# Patient Record
Sex: FEMALE | Race: WHITE | NOT HISPANIC OR LATINO | Employment: PART TIME | URBAN - METROPOLITAN AREA
[De-identification: names, ages, dates, MRNs, and addresses within clinical notes are randomized per-mention and may not be internally consistent; named-entity substitution may affect disease eponyms.]

---

## 2017-01-18 ENCOUNTER — PRENATAL OFFICE VISIT (OUTPATIENT)
Dept: NURSING | Facility: CLINIC | Age: 27
End: 2017-01-18
Payer: COMMERCIAL

## 2017-01-18 VITALS
HEART RATE: 113 BPM | OXYGEN SATURATION: 100 % | DIASTOLIC BLOOD PRESSURE: 76 MMHG | RESPIRATION RATE: 18 BRPM | WEIGHT: 165.5 LBS | SYSTOLIC BLOOD PRESSURE: 123 MMHG | BODY MASS INDEX: 28.39 KG/M2

## 2017-01-18 DIAGNOSIS — N91.2 AMENORRHEA: Primary | ICD-10-CM

## 2017-01-18 LAB — BETA HCG QUAL IFA URINE: POSITIVE

## 2017-01-18 PROCEDURE — 99207 ZZC NO CHARGE NURSE ONLY: CPT

## 2017-01-18 PROCEDURE — 84703 CHORIONIC GONADOTROPIN ASSAY: CPT | Performed by: FAMILY MEDICINE

## 2017-01-18 RX ORDER — PRENATAL VIT/IRON FUM/FOLIC AC 27MG-0.8MG
1 TABLET ORAL DAILY
Qty: 100 TABLET | Refills: 3 | COMMUNITY
Start: 2017-01-18 | End: 2018-09-15

## 2017-01-18 NOTE — PROGRESS NOTES
Subjective: 26 year old patient presents for pregnancy confirmation. Her last menstrual period was 12/9/16. She has had a positive home test This is her first pregnancy, G1, P0. She has had previous n/a. She would like to be seen Dr. Cedeno for her prenatal care. She has started prenatal vitamins.      Exam:  General Appearance: appears well.  Her urine pregnancy test is positive.    Assessment: positive pregnancy confirmation.    Plan: Patient has an EDC of 9/14/17. Is currently 5w4d along. I scheduled her first OB appointment with Dr. Cedeno for 2/27/17 5:30 pm. Risk assessment done. We discussed basic pregnancy care, diet, exercise and prenatal vitamins.     Pre Term Labor Risk Assessment   1. Is the patient's age <18 or >40? no  2. Does the patient have a BMI < 18.5? no  3. If previous pregnancy, was delivery within previous 6 months? no  4. Have you ever been diagnosed with pyelonephritis? no  5. Have you ever delivered a baby prior to 37 weeks gestation? no  6. Have you ever been told you have a uterine anomaly? no  7. Do you currently have uterine fibroids? no  8. Have you had any gynecological surgical procedures such as cervical conization, a LEEP procedure, laser treatment or cryosurgery of the cervix? no  9. Did your mother take DEWAYNE or any other hormones when she was pregnant with you? no  10. Did conception for this pregnancy occur via In Vitro Fertilization? no  11. Are you carrying twins? no  12. Do you currently use tobacco products? no  13. Prior to this pregnancy, how much alcohol did you drink each week? (if >7/week= high risk..)  1/wk  14. Since you learned you were pregnant, how much beer, wine or hard liquor did you drink per week? (anything >0 = high risk) none  15. Prior to learning you are pregnant, did you use any of the following: marijuana, prescription narcotics, speed, cocaine, heroin, hallucinogens or other drugs? (any use within 1 yr = high risk)  none  16. Since you learned you are  pregnant, have you used any of the following: marijuana, prescription narcotics, speed, cocaine, heroin, hallucinogens or other drugs? (any use = high risk)  none  17. Do you have a history of chemical dependency (yes=high risk)  no  18. Have you ever been treated for more than 1 Urinary Tract Infection during this pregnancy? no  19. Do you have a history of Depression, Bi-polar disorder, anxiety, schizophrenia or other mental illness?  Yes, age 18-20  20. Are you currently being treated for depression, bi-polar disorder, anxiety, schizophrenia or other mental illness? no  21. Have you had Chlamydia or gonorrhea during this pregnancy? no  22. Periodontal disease (gum disease)? no  23. Has anyone hit, slapped, kicked or otherwise hurt you? no  24. Has anyone forced you to have sex when you didn't want to? no  Summary:   Patient is not high risk for  Labor   Jessica Conti RN

## 2017-02-27 ENCOUNTER — PRENATAL OFFICE VISIT (OUTPATIENT)
Dept: FAMILY MEDICINE | Facility: CLINIC | Age: 27
End: 2017-02-27
Payer: COMMERCIAL

## 2017-02-27 VITALS
WEIGHT: 164 LBS | HEIGHT: 64 IN | SYSTOLIC BLOOD PRESSURE: 122 MMHG | TEMPERATURE: 98.1 F | HEART RATE: 108 BPM | BODY MASS INDEX: 28 KG/M2 | DIASTOLIC BLOOD PRESSURE: 64 MMHG

## 2017-02-27 DIAGNOSIS — R79.89 LOW SERUM SODIUM: ICD-10-CM

## 2017-02-27 DIAGNOSIS — Z34.90 PRENATAL CARE, UNSPECIFIED TRIMESTER: Primary | ICD-10-CM

## 2017-02-27 DIAGNOSIS — Z34.91 INITIAL OBSTETRIC VISIT IN FIRST TRIMESTER: ICD-10-CM

## 2017-02-27 DIAGNOSIS — R79.0 LOW IRON STORES: ICD-10-CM

## 2017-02-27 LAB
BETA HCG QUAL IFA URINE: POSITIVE
ERYTHROCYTE [DISTWIDTH] IN BLOOD BY AUTOMATED COUNT: 13.9 % (ref 10–15)
HCT VFR BLD AUTO: 35.9 % (ref 35–47)
HGB BLD-MCNC: 12 G/DL (ref 11.7–15.7)
MCH RBC QN AUTO: 25.6 PG (ref 26.5–33)
MCHC RBC AUTO-ENTMCNC: 33.4 G/DL (ref 31.5–36.5)
MCV RBC AUTO: 77 FL (ref 78–100)
PLATELET # BLD AUTO: 319 10E9/L (ref 150–450)
RBC # BLD AUTO: 4.69 10E12/L (ref 3.8–5.2)
WBC # BLD AUTO: 9.5 10E9/L (ref 4–11)

## 2017-02-27 PROCEDURE — 87389 HIV-1 AG W/HIV-1&-2 AB AG IA: CPT | Performed by: FAMILY MEDICINE

## 2017-02-27 PROCEDURE — 86850 RBC ANTIBODY SCREEN: CPT | Performed by: FAMILY MEDICINE

## 2017-02-27 PROCEDURE — 36415 COLL VENOUS BLD VENIPUNCTURE: CPT | Performed by: FAMILY MEDICINE

## 2017-02-27 PROCEDURE — 87591 N.GONORRHOEAE DNA AMP PROB: CPT | Performed by: FAMILY MEDICINE

## 2017-02-27 PROCEDURE — 86900 BLOOD TYPING SEROLOGIC ABO: CPT | Performed by: FAMILY MEDICINE

## 2017-02-27 PROCEDURE — 86780 TREPONEMA PALLIDUM: CPT | Performed by: FAMILY MEDICINE

## 2017-02-27 PROCEDURE — 87491 CHLMYD TRACH DNA AMP PROBE: CPT | Performed by: FAMILY MEDICINE

## 2017-02-27 PROCEDURE — 80048 BASIC METABOLIC PNL TOTAL CA: CPT | Performed by: FAMILY MEDICINE

## 2017-02-27 PROCEDURE — 86762 RUBELLA ANTIBODY: CPT | Performed by: FAMILY MEDICINE

## 2017-02-27 PROCEDURE — 85027 COMPLETE CBC AUTOMATED: CPT | Performed by: FAMILY MEDICINE

## 2017-02-27 PROCEDURE — 99207 ZZC FIRST OB VISIT: CPT | Performed by: FAMILY MEDICINE

## 2017-02-27 PROCEDURE — 84703 CHORIONIC GONADOTROPIN ASSAY: CPT | Performed by: FAMILY MEDICINE

## 2017-02-27 PROCEDURE — 87340 HEPATITIS B SURFACE AG IA: CPT | Performed by: FAMILY MEDICINE

## 2017-02-27 PROCEDURE — 87086 URINE CULTURE/COLONY COUNT: CPT | Performed by: FAMILY MEDICINE

## 2017-02-27 PROCEDURE — 86901 BLOOD TYPING SEROLOGIC RH(D): CPT | Performed by: FAMILY MEDICINE

## 2017-02-27 NOTE — NURSING NOTE
"Chief Complaint   Patient presents with     Prenatal Care     1st OB, 11 weeks 3 days       Initial /64 (BP Location: Right arm, Patient Position: Chair, Cuff Size: Adult Regular)  Pulse 108  Temp 98.1  F (36.7  C) (Oral)  Ht 5' 4\" (1.626 m)  Wt 164 lb (74.4 kg)  LMP 12/09/2016 (Exact Date)  BMI 28.15 kg/m2 Estimated body mass index is 28.15 kg/(m^2) as calculated from the following:    Height as of this encounter: 5' 4\" (1.626 m).    Weight as of this encounter: 164 lb (74.4 kg).  Medication Reconciliation: complete     Rangel Gaxiola CMA      "

## 2017-02-27 NOTE — MR AVS SNAPSHOT
After Visit Summary   2/27/2017    Nini Wang    MRN: [id number]           Patient Information     Date Of Birth          1990        Visit Information        Provider Department      2/27/2017 5:30 PM Judit Colbert MD [facility]        Today's Diagnoses     Prenatal care, unspecified trimester    -  1    Low iron stores        Low serum sodium          Care Instructions      Fetal development begins soon after conception. Find out how your baby grows and develops during the first trimester.    You're pregnant. Congratulations! You'll undoubtedly spend the months ahead wondering how your baby is growing and developing. What does your baby look like? How big is he or she? When will you feel the first kick?  Fetal development typically follows a predictable course. Find out what happens during the first trimester by checking out this weekly calendar of events. Keep in mind that measurements are approximate.  It might seem strange, but you're not actually pregnant the first week or two of the time allotted to your pregnancy. Yes, you read that correctly!  Conception typically occurs about two weeks after your last period begins. To calculate your due date, your health care provider will count ahead 40 weeks from the start of your last period. This means your period is counted as part of your pregnancy -- even though you weren't pregnant at the time.  The sperm and egg unite in one of your fallopian tubes to form a one-celled entity called a zygote. If more than one egg is released and fertilized, you might have multiple zygotes.  The zygote typically has 46 chromosomes -- 23 from you and 23 from the father. These chromosomes help determine your baby's sex, traits such as eye and hair color, and, to some extent, personality and intelligence.  Soon after fertilization, the zygote travels down the fallopian tube toward the uterus. At the same time, it will begin dividing to form  a cluster of cells resembling a tiny raspberry -- a morula.  By the time it reaches the uterus, the rapidly dividing ball of cells -- now known as a blastocyst -- has  into two sections.  The inner group of cells will become the embryo. The outer group will become the cells that nourish and protect it. On contact, the blastocyst will burrow into the uterine wall for nourishment. This process is called implantation.  The placenta, which will nourish your baby throughout the pregnancy, also begins to form.   The fifth week of pregnancy, or the third week after conception, marks the beginning of the embryonic period. This is when the baby's brain, spinal cord, heart and other organs begin to form.  The embryo is now made of three layers. The top layer -- the ectoderm -- will give rise to your baby's outermost layer of skin, central and peripheral nervous systems, eyes, inner ears, and many connective tissues.  Your baby's heart and a primitive circulatory system will form in the middle layer of cells -- the mesoderm. This layer of cells will also serve as the foundation for your baby's bones, muscles, kidneys and much of the reproductive system.  The inner layer of cells -- the endoderm -- will become a simple tube lined with mucous membranes. Your baby's lungs, intestines and bladder will develop here.  By the end of this week, your baby is likely about the size of the tip of a pen.  Growth is rapid this week. Just four weeks after conception, the neural tube along your baby's back is closing and your baby's heart is pumping blood.  Basic facial features will begin to appear, including passageways that will make up the inner ears and arches that will contribute to the jaw. Your baby's body begins to take on a C-shaped curvature. Small buds will soon become arms and legs.  Seven weeks into your pregnancy, or five weeks after conception, your baby's brain and face are rapidly developing. Tiny nostrils become  visible, and the eye lenses begin to form. The arm buds that sprouted last week now take on the shape of paddles.  By the end of this week, your baby might be a little bigger than the top of a pencil eraser.  Eight weeks into your pregnancy, or six weeks after conception, your baby's arms and legs are growing longer, and fingers have begun to form. The shell-shaped parts of your baby's ears also are forming, and your baby's eyes are visible. The upper lip and nose have formed. The trunk of your baby's body is beginning to straighten.  By the end of this week, your baby might be about 1/2 inch (11 to 14 millimeters) long.  In the ninth week of pregnancy, or seven weeks after conception, your baby's arms grow, develop bones and bend at the elbows. Toes form, and your baby's eyelids and ears continue developing.   By the end of this week, your baby might be about 3/4 inch (20 millimeters) long.   By the 10th week of pregnancy, or eight weeks after conception, your baby's head has become more round. The neck begins to develop, and your baby's eyelids begin to close to protect his or her developing eyes.   At the beginning of the 11th week of pregnancy, or the ninth week after conception, your baby's head still makes up about half of its length. However, your baby's body is about to catch up, growing rapidly in the coming weeks.  Your baby is now officially described as a fetus. This week your baby's eyes are widely , the eyelids fused and the ears low set. Red blood cells are beginning to form in your baby's liver. By the end of this week, your baby's external genitalia will start developing into a penis or clitoris and labia majora.  By now your baby might measure about 2 inches (50 millimeters) long from crown to rump and weigh almost 1/3 ounce (8 grams).  Twelve weeks into your pregnancy, or 10 weeks after conception, your baby is developing fingernails. Your baby's face now has a human profile.  By now your  "baby might be about 2 1/2 inches (60 millimeters) long from crown to rump and weigh about 1/2 ounce (14 grams).                Follow-ups after your visit        Follow-up notes from your care team     Return in about 4 weeks (around 3/27/2017).      Future tests that were ordered for you today     Open Future Orders        Priority Expected Expires Ordered    US OB > 14 Weeks Complete Single Routine  2018            Who to contact     If you have questions or need follow up information about today's clinic visit or your schedule please contact [facility] directly at [phone].  Normal or non-critical lab and imaging results will be communicated to you by Is[employer], letter or phone within 4 business days after the clinic has received the results. If you do not hear from us within 7 days, please contact the clinic through Isagenhart or phone. If you have a critical or abnormal lab result, we will notify you by phone as soon as possible.  Submit refill requests through [employer] or call your pharmacy and they will forward the refill request to us. Please allow 3 business days for your refill to be completed.          Additional Information About Your Visit        Isagenhart Information     [employer] lets you send messages to your doctor, view your test results, renew your prescriptions, schedule appointments and more. To sign up, go to [email or web address] . Click on \"Log in\" on the left side of the screen, which will take you to the Welcome page. Then click on \"Sign up Now\" on the right side of the page.     You will be asked to enter the access code listed below, as well as some personal information. Please follow the directions to create your username and password.     Your access code is: [email or web address]  Expires: 2017  6:03 PM     Your access code will  in 90 days. If you need help or a new code, please call your [facility] or [phone].        Care EveryWhere ID     " "This is your Care EveryWhere ID. This could be used by other organizations to access your [facility] medical records  [id number]        Your Vitals Were     Pulse Temperature Height Last Period BMI (Body Mass Index)       108 98.1  F (36.7  C) (Oral) 5' 4\" (1.626 m) 12/09/2016 (Exact Date) 28.15 kg/m2        Blood Pressure from Last 3 Encounters:   02/27/17 122/64   01/18/17 123/76   10/07/15 125/65    Weight from Last 3 Encounters:   02/27/17 164 lb (74.4 kg)   01/18/17 165 lb 8 oz (75.1 kg)   10/07/15 162 lb (73.5 kg)              We Performed the Following     ABO/Rh type and screen     Anti Treponema     Basic metabolic panel     Beta HCG qual IFA urine     CBC with platelets     Chlamydia trachomatis PCR     Hepatitis B surface antigen     HIV Antigen Antibody Combo     Neisseria gonorrhoeae PCR     Rubella Antibody IgG Quantitative     Urine Culture Aerobic Bacterial        Primary Care Provider Office Phone # Fax #    Judit Colbert [phone]       [facility] [address] [facility] [address]        Thank you!     Thank you for choosing [facility]  for your care. Our goal is always to provide you with excellent care. Hearing back from our patients is one way we can continue to improve our services. Please take a few minutes to complete the written survey that you may receive in the mail after your visit with us. Thank you!             Your Updated Medication List - Protect others around you: Learn how to safely use, store and throw away your medicines at www.disposemymeds.org.          This list is accurate as of: 2/27/17  6:03 PM.  Always use your most recent med list.                   Brand Name Dispense Instructions for use    prenatal multivitamin  plus iron 27-0.8 MG Tabs per tablet     100 tablet    Take 1 tablet by mouth daily         "

## 2017-02-27 NOTE — PROGRESS NOTES
"Subjective:  Nini Wang is a 27 year old female  who presents today for her first prenatal visit.  She has never had an abnormal PAP smear.  Her LMP was 12/9/16.  She has had trouble with nausea.  This is her 1st pregnancy.  She is a G 1 P 0.    Her EDC is 9/15/17.  She has the following questions:  none    Current Outpatient Prescriptions   Medication Sig Dispense Refill     Prenatal Vit-Fe Fumarate-FA (PRENATAL MULTIVITAMIN  PLUS IRON) 27-0.8 MG TABS per tablet Take 1 tablet by mouth daily 100 tablet 3       Past Medical History   Diagnosis Date     Depression      age 18-21 - now in remission     Low iron stores      has taken iron in 2014       Past Surgical History   Procedure Laterality Date     No history of surgery         Family History   Problem Relation Age of Onset     Thyroid Disease Mother      GASTROINTESTINAL DISEASE Father      small bowel obstructions     Dementia Maternal Grandmother      vascular     Lung Cancer Paternal Grandmother      Pancreatic Cancer Paternal Grandfather 78       Social History   Substance Use Topics     Smoking status: Never Smoker     Smokeless tobacco: Never Used     Alcohol use Yes      Comment: not while pregnant       Objective:  Blood pressure 122/64, pulse 108, temperature 98.1  F (36.7  C), temperature source Oral, height 5' 4\" (1.626 m), weight 164 lb (74.4 kg), last menstrual period 12/09/2016, not currently breastfeeding.  General appearance: Healthy.  Mental Status: cooperative, normal affect, no gross thought process defects.  HEENT:   Ears- Normal external canals and TMs  Eyes- PERRL, EOM's intact, fundi benign, sharp disc margins.  Throat- Normal  Nodes- Negative  Thyroid: Normal to palpation, no enlargement or nodules noted.  Breasts: Symmetric without mass tenderness or discharge.  Axillary nodes negative.  Lungs: Clear to auscultation bilaterally  Heart.: Normal rate and rhythm.  No murmurs, clicks or gallops.  Pulses:    R-4/4, PT-4/4, " DP-4/4  Abdomen: BS active. Soft, non-tender, no masses or organomegaly.  Aorta normal. No bruits.  Genitalia: Normal female external genitalia without lesions.  Speculum:  Cervix normal,  Pap taken, bimanual exam  12 size uterus, no adenexal mass or tenderness.  Doptone 150-160.  Extremities: Normal without edema  Reflexes:  Symmetric bilaterally and 2 + at the patella  Skin: Clear and free of rash.    Assessment:  1. Nini Wang is a healthy 27 year old female here for a first prenatal visit.  2. Hx of low sodium and low iron a few years ago    Plan:  1. A Pap smear was NOT obtained  2. Prenatal labs ordered - see epicare orders  3. fetal anatomy survey to be done around 20 weeks gestation  4. Patient is to follow-up in 4 weeks and as needed.

## 2017-02-28 LAB
ABO + RH BLD: NORMAL
ABO + RH BLD: NORMAL
ANION GAP SERPL CALCULATED.3IONS-SCNC: 12 MMOL/L (ref 3–14)
BACTERIA SPEC CULT: NO GROWTH
BLD GP AB SCN SERPL QL: NORMAL
BLOOD BANK CMNT PATIENT-IMP: NORMAL
BUN SERPL-MCNC: 5 MG/DL (ref 7–30)
CALCIUM SERPL-MCNC: 9.1 MG/DL (ref 8.5–10.1)
CHLORIDE SERPL-SCNC: 105 MMOL/L (ref 94–109)
CO2 SERPL-SCNC: 21 MMOL/L (ref 20–32)
CREAT SERPL-MCNC: 0.52 MG/DL (ref 0.52–1.04)
GFR SERPL CREATININE-BSD FRML MDRD: ABNORMAL ML/MIN/1.7M2
GLUCOSE SERPL-MCNC: 122 MG/DL (ref 70–99)
HBV SURFACE AG SERPL QL IA: NONREACTIVE
HIV 1+2 AB+HIV1 P24 AG SERPL QL IA: NORMAL
MICRO REPORT STATUS: NORMAL
POTASSIUM SERPL-SCNC: 3.6 MMOL/L (ref 3.4–5.3)
RUBV IGG SERPL IA-ACNC: 28 IU/ML
SODIUM SERPL-SCNC: 138 MMOL/L (ref 133–144)
SPECIMEN EXP DATE BLD: NORMAL
SPECIMEN SOURCE: NORMAL
T PALLIDUM IGG+IGM SER QL: NEGATIVE

## 2017-02-28 NOTE — PATIENT INSTRUCTIONS
Fetal development begins soon after conception. Find out how your baby grows and develops during the first trimester.    You're pregnant. Congratulations! You'll undoubtedly spend the months ahead wondering how your baby is growing and developing. What does your baby look like? How big is he or she? When will you feel the first kick?  Fetal development typically follows a predictable course. Find out what happens during the first trimester by checking out this weekly calendar of events. Keep in mind that measurements are approximate.  It might seem strange, but you're not actually pregnant the first week or two of the time allotted to your pregnancy. Yes, you read that correctly!  Conception typically occurs about two weeks after your last period begins. To calculate your due date, your health care provider will count ahead 40 weeks from the start of your last period. This means your period is counted as part of your pregnancy -- even though you weren't pregnant at the time.  The sperm and egg unite in one of your fallopian tubes to form a one-celled entity called a zygote. If more than one egg is released and fertilized, you might have multiple zygotes.  The zygote typically has 46 chromosomes -- 23 from you and 23 from the father. These chromosomes help determine your baby's sex, traits such as eye and hair color, and, to some extent, personality and intelligence.  Soon after fertilization, the zygote travels down the fallopian tube toward the uterus. At the same time, it will begin dividing to form a cluster of cells resembling a tiny raspberry -- a morula.  By the time it reaches the uterus, the rapidly dividing ball of cells -- now known as a blastocyst -- has  into two sections.  The inner group of cells will become the embryo. The outer group will become the cells that nourish and protect it. On contact, the blastocyst will burrow into the uterine wall for nourishment. This process is called  implantation.  The placenta, which will nourish your baby throughout the pregnancy, also begins to form.   The fifth week of pregnancy, or the third week after conception, marks the beginning of the embryonic period. This is when the baby's brain, spinal cord, heart and other organs begin to form.  The embryo is now made of three layers. The top layer -- the ectoderm -- will give rise to your baby's outermost layer of skin, central and peripheral nervous systems, eyes, inner ears, and many connective tissues.  Your baby's heart and a primitive circulatory system will form in the middle layer of cells -- the mesoderm. This layer of cells will also serve as the foundation for your baby's bones, muscles, kidneys and much of the reproductive system.  The inner layer of cells -- the endoderm -- will become a simple tube lined with mucous membranes. Your baby's lungs, intestines and bladder will develop here.  By the end of this week, your baby is likely about the size of the tip of a pen.  Growth is rapid this week. Just four weeks after conception, the neural tube along your baby's back is closing and your baby's heart is pumping blood.  Basic facial features will begin to appear, including passageways that will make up the inner ears and arches that will contribute to the jaw. Your baby's body begins to take on a C-shaped curvature. Small buds will soon become arms and legs.  Seven weeks into your pregnancy, or five weeks after conception, your baby's brain and face are rapidly developing. Tiny nostrils become visible, and the eye lenses begin to form. The arm buds that sprouted last week now take on the shape of paddles.  By the end of this week, your baby might be a little bigger than the top of a pencil eraser.  Eight weeks into your pregnancy, or six weeks after conception, your baby's arms and legs are growing longer, and fingers have begun to form. The shell-shaped parts of your baby's ears also are forming, and  your baby's eyes are visible. The upper lip and nose have formed. The trunk of your baby's body is beginning to straighten.  By the end of this week, your baby might be about 1/2 inch (11 to 14 millimeters) long.  In the ninth week of pregnancy, or seven weeks after conception, your baby's arms grow, develop bones and bend at the elbows. Toes form, and your baby's eyelids and ears continue developing.   By the end of this week, your baby might be about 3/4 inch (20 millimeters) long.   By the 10th week of pregnancy, or eight weeks after conception, your baby's head has become more round. The neck begins to develop, and your baby's eyelids begin to close to protect his or her developing eyes.   At the beginning of the 11th week of pregnancy, or the ninth week after conception, your baby's head still makes up about half of its length. However, your baby's body is about to catch up, growing rapidly in the coming weeks.  Your baby is now officially described as a fetus. This week your baby's eyes are widely , the eyelids fused and the ears low set. Red blood cells are beginning to form in your baby's liver. By the end of this week, your baby's external genitalia will start developing into a penis or clitoris and labia majora.  By now your baby might measure about 2 inches (50 millimeters) long from crown to rump and weigh almost 1/3 ounce (8 grams).  Twelve weeks into your pregnancy, or 10 weeks after conception, your baby is developing fingernails. Your baby's face now has a human profile.  By now your baby might be about 2 1/2 inches (60 millimeters) long from crown to rump and weigh about 1/2 ounce (14 grams).

## 2017-03-01 ENCOUNTER — TELEPHONE (OUTPATIENT)
Dept: FAMILY MEDICINE | Facility: CLINIC | Age: 27
End: 2017-03-01

## 2017-03-01 LAB
C TRACH DNA SPEC QL NAA+PROBE: NORMAL
N GONORRHOEA DNA SPEC QL NAA+PROBE: NORMAL
SPECIMEN SOURCE: NORMAL
SPECIMEN SOURCE: NORMAL

## 2017-03-01 NOTE — TELEPHONE ENCOUNTER
Patient called back.  Advised of below results and that Dr. Cedeno would like her to do her 1 hour glucose at next visit.  Patient agrees with plan.  Jessica Conti RN

## 2017-03-01 NOTE — TELEPHONE ENCOUNTER
Left message to call back to clinic.  Judit Colbert MD [facility]                     So far all labs are good but the glucose was a little high.   I would like to do her 1 hour glucose test at her next visit instead of the 24 week visit         Sari Simon

## 2017-03-27 ENCOUNTER — PRENATAL OFFICE VISIT (OUTPATIENT)
Dept: FAMILY MEDICINE | Facility: CLINIC | Age: 27
End: 2017-03-27
Payer: COMMERCIAL

## 2017-03-27 VITALS
BODY MASS INDEX: 28.32 KG/M2 | DIASTOLIC BLOOD PRESSURE: 68 MMHG | HEIGHT: 64 IN | SYSTOLIC BLOOD PRESSURE: 112 MMHG | HEART RATE: 97 BPM | WEIGHT: 165.9 LBS | RESPIRATION RATE: 18 BRPM | TEMPERATURE: 98.2 F

## 2017-03-27 DIAGNOSIS — Z34.92 PRENATAL CARE IN SECOND TRIMESTER: Primary | ICD-10-CM

## 2017-03-27 DIAGNOSIS — R73.09 ELEVATED GLUCOSE TOLERANCE TEST: ICD-10-CM

## 2017-03-27 PROCEDURE — 81511 FTL CGEN ABNOR FOUR ANAL: CPT | Mod: 90 | Performed by: FAMILY MEDICINE

## 2017-03-27 PROCEDURE — 99000 SPECIMEN HANDLING OFFICE-LAB: CPT | Performed by: FAMILY MEDICINE

## 2017-03-27 PROCEDURE — 99207 ZZC PRENATAL VISIT: CPT | Performed by: FAMILY MEDICINE

## 2017-03-27 PROCEDURE — 36415 COLL VENOUS BLD VENIPUNCTURE: CPT | Performed by: FAMILY MEDICINE

## 2017-03-27 PROCEDURE — 82950 GLUCOSE TEST: CPT | Performed by: FAMILY MEDICINE

## 2017-03-27 NOTE — MR AVS SNAPSHOT
After Visit Summary   3/27/2017    Nini Wang    MRN: [id number]           Patient Information     Date Of Birth          1990        Visit Information        Provider Department      3/27/2017 5:30 PM Judit Colbert MD [facility] [place]        Today's Diagnoses     Prenatal care in second trimester    -  1      Care Instructions        Fetal development takes on new meaning in the second trimester. Highlights might include finding out your baby's sex and feeling your baby move.    As your pregnancy progresses, your baby might begin to seem more real. You might hear the heartbeat at your prenatal appointments, and your enlarging abdomen might force you to put away your favorite jeans.  While you're adjusting to the changes in your body, fetal development takes on new meaning. Two months ago, your baby was simply a cluster of cells. Now he or she has functioning organs, nerves and muscles. Find out what happens during the second trimester by checking out this weekly calendar of events. Keep in mind that measurements are approximate.  Thirteen weeks into your pregnancy, or 11 weeks after conception, your baby's intestines have returned to his or her abdomen from the umbilical cord -- where they've been growing for the past couple of weeks. Your baby is also beginning to form urine and discharge it into the amniotic fluid.  Tissue that will become bone is also developing around your baby's head and within his or her arms and legs.  Fourteen weeks into your pregnancy, or 12 weeks after conception, your baby's arms have almost reached the final relative lengths they'll be at birth and your baby's neck has become more defined. Red blood cells are forming in your baby's spleen.  Your baby's sex will become apparent this week or in the coming weeks. For girls, ovarian follicles begin forming. For boys, the prostate appears.  By now your baby might be almost 3 1/2 inches (90 millimeters)  long from crown to rump and weigh about 1 1/2 ounces (40 grams).  Fifteen weeks into your pregnancy, or 13 weeks after conception, your baby is growing rapidly. Your baby's skeleton is developing bones, which will become visible on ultrasound images in a few weeks. Your baby's scalp hair pattern also is forming.  Sixteen weeks into your pregnancy, or 14 weeks after conception, your baby's eyes have begun to face forward and slowly move. The ears are close to reaching their final position. Your baby might be able to make sucking motions with his or her mouth.  Your baby's movements are becoming coordinated and can be detected during ultrasound exams.  By now your baby might be more than 4 1/2 inches (120 millimeters) long from crown to rump.  Seventeen weeks into your pregnancy, or 15 weeks after conception, toenails have begun to develop. Soon fat stores begin to develop under your baby's skin. The fat will provide energy and help keep your baby warm after birth.  Eighteen weeks into your pregnancy, or 16 weeks after conception, your baby's ears begin to stand out on the sides of his or her head. Your baby might begin to hear.  By now your baby might be 5 1/2 inches (140 millimeters) long from crown to rump and weigh 7 ounces (200 grams).  Nineteen weeks into your pregnancy, or 17 weeks after conception, a greasy, cheese-like coating called vernix caseosa begins to cover your baby. The vernix caseosa helps protect your baby's delicate skin from abrasions, chapping and hardening that can result from exposure to amniotic fluid.  For girls, the uterus and vagina might begin to form this week.  [place] into your pregnancy, or 18 weeks after conception, you might be able to feel your baby's movements, also known as quickening. If you've been pregnant before, you might have begun feeling your baby's movements a few weeks ago.  By now your baby might be about 6 1/3 inches (160 millimeters) long from crown to  rump.  Twenty-one weeks into your pregnancy, or 19 weeks after conception, your baby is poised to gain more weight. By this week your baby is becoming more active and is able to swallow.  Twenty-two weeks into your pregnancy, or 20 weeks after conception, your baby is completely covered with a fine, down-like hair called lanugo. The lanugo helps hold the vernix caseosa on the skin. Your baby's eyebrows might be visible.  By now your baby might be 7 1/2 inches (190 millimeters) long from crown to rump and weigh 1 pound (460 grams).  Twenty-three weeks into your pregnancy, or 21 weeks after conception, your baby's skin is wrinkled, more translucent than before and pink to red in color.  This week your baby begins to have rapid eye movements. Your baby's tongue will soon develop taste buds. Fingerprints and footprints are forming. For boys, the testes are descending from the abdomen. For girls, the uterus and ovaries are in place -- complete with a lifetime supply of eggs.  With intensive medical care, some babies born this week might be able to survive.  Twenty-four weeks into your pregnancy, or 22 weeks after conception, your baby is regularly sleeping and waking. Real hair is growing on his or her head.  By now your baby might be about 8 inches (210 millimeters) long from crown to rump and weigh more than 1 1/3 pounds (630 grams).  Twenty-five weeks into your pregnancy, or 23 weeks after conception, your baby's hands and startle reflex are developing. Your baby might be able to respond to familiar sounds, such as your voice, with movement.  Twenty-six weeks into your pregnancy, or 24 weeks after conception, your baby has fingernails.  Your baby's lungs are beginning to produce surfactant, the substance that allows the air sacs in the lungs to inflate -- and keeps them from collapsing and sticking together when they deflate.  By now your baby might be 9 inches (230 millimeters) long from crown to rump and weigh  nearly 2 pounds (820 grams).  This week marks the end of the second trimester. At 27 weeks, or 25 weeks after conception, your baby's lungs and nervous system are continuing to mature -- and he or she has likely been growing like a weed.                Follow-ups after your visit        Follow-up notes from your care team     Return in about 4 weeks (around 4/24/2017).      Your next 10 appointments already scheduled     Apr 26, 2017  9:00 AM CDT   US OB > 14 WEEKS COMPLETE SINGLE with RIUS1   [facility] ([facility]    [address]  [address]  [facility] [address] [phone]           Please bring a list of your medicines (including vitamins, minerals and over-the-counter drugs). Also, tell your doctor about any allergies you may have. Wear comfortable clothes and leave your valuables at home.  If you re less than 20 weeks drink four 8-ounce glasses of fluid an hour before your exam. If you need to empty your bladder before your exam, try to release only a little urine. Then, drink another glass of fluid.  You may have up to two family members in the exam room. If you bring a small child, an adult must be there to care for him or her.  Please call the Imaging Department at your exam site with any questions.              Who to contact     If you have questions or need follow up information about today's clinic visit or your schedule please contact [facility] directly at [phone].  Normal or non-critical lab and imaging results will be communicated to you by MyChart, letter or phone within 4 business days after the clinic has received the results. If you do not hear from us within 7 days, please contact the clinic through Lumorahart or phone. If you have a critical or abnormal lab result, we will notify you by phone as soon as possible.  Submit refill requests through DigitalTangible or call your pharmacy and they will forward the refill request to  "us. Please allow 3 business days for your refill to be completed.          Additional Information About Your Visit        MyChart Information     [employer] gives you secure access to your electronic health record. If you see a primary care provider, you can also send messages to your care team and make appointments. If you have questions, please call your primary care clinic.  If you do not have a primary care provider, please call [phone] and they will assist you.        Care EveryWhere ID     This is your Care EveryWhere ID. This could be used by other organizations to access your [place] medical records  [id number]        Your Vitals Were     Pulse Temperature Respirations Height Last Period BMI (Body Mass Index)    97 98.2  F (36.8  C) (Oral) 18 5' 4\" (1.626 m) 12/09/2016 (Exact Date) 28.48 kg/m2       Blood Pressure from Last 3 Encounters:   03/27/17 112/68   02/27/17 122/64   01/18/17 123/76    Weight from Last 3 Encounters:   03/27/17 165 lb 14.4 oz (75.3 kg)   02/27/17 164 lb (74.4 kg)   01/18/17 165 lb 8 oz (75.1 kg)              We Performed the Following     Glucose tolerance gest screen 1 hour     Maternal quad screen        Primary Care Provider Office Phone # Fax #    Judit Colbert [phone]       [facility] [address] [facility] [address]        Thank you!     Thank you for choosing [facility]  for your care. Our goal is always to provide you with excellent care. Hearing back from our patients is one way we can continue to improve our services. Please take a few minutes to complete the written survey that you may receive in the mail after your visit with us. Thank you!             Your Updated Medication List - Protect others around you: Learn how to safely use, store and throw away your medicines at www.disposemymeds.org.          This list is accurate as of: 3/27/17  5:48 PM.  Always use your most recent med list.                   " Brand Name Dispense Instructions for use    prenatal multivitamin  plus iron 27-0.8 MG Tabs per tablet     100 tablet    Take 1 tablet by mouth daily

## 2017-03-27 NOTE — NURSING NOTE
"Chief Complaint   Patient presents with     Prenatal Care     15 weeks 3 days       Initial /68 (BP Location: Right arm, Patient Position: Chair, Cuff Size: Adult Regular)  Pulse 97  Temp 98.2  F (36.8  C) (Oral)  Resp 18  Ht 5' 4\" (1.626 m)  Wt 165 lb 14.4 oz (75.3 kg)  LMP 12/09/2016 (Exact Date)  BMI 28.48 kg/m2 Estimated body mass index is 28.48 kg/(m^2) as calculated from the following:    Height as of this encounter: 5' 4\" (1.626 m).    Weight as of this encounter: 165 lb 14.4 oz (75.3 kg).  Medication Reconciliation: complete     Rangel Gaxiola CMA      "

## 2017-03-27 NOTE — PATIENT INSTRUCTIONS
Fetal development takes on new meaning in the second trimester. Highlights might include finding out your baby's sex and feeling your baby move.    As your pregnancy progresses, your baby might begin to seem more real. You might hear the heartbeat at your prenatal appointments, and your enlarging abdomen might force you to put away your favorite jeans.  While you're adjusting to the changes in your body, fetal development takes on new meaning. Two months ago, your baby was simply a cluster of cells. Now he or she has functioning organs, nerves and muscles. Find out what happens during the second trimester by checking out this weekly calendar of events. Keep in mind that measurements are approximate.  Thirteen weeks into your pregnancy, or 11 weeks after conception, your baby's intestines have returned to his or her abdomen from the umbilical cord -- where they've been growing for the past couple of weeks. Your baby is also beginning to form urine and discharge it into the amniotic fluid.  Tissue that will become bone is also developing around your baby's head and within his or her arms and legs.  Fourteen weeks into your pregnancy, or 12 weeks after conception, your baby's arms have almost reached the final relative lengths they'll be at birth and your baby's neck has become more defined. Red blood cells are forming in your baby's spleen.  Your baby's sex will become apparent this week or in the coming weeks. For girls, ovarian follicles begin forming. For boys, the prostate appears.  By now your baby might be almost 3 1/2 inches (90 millimeters) long from crown to rump and weigh about 1 1/2 ounces (40 grams).  Fifteen weeks into your pregnancy, or 13 weeks after conception, your baby is growing rapidly. Your baby's skeleton is developing bones, which will become visible on ultrasound images in a few weeks. Your baby's scalp hair pattern also is forming.  Sixteen weeks into your pregnancy, or 14 weeks after  conception, your baby's eyes have begun to face forward and slowly move. The ears are close to reaching their final position. Your baby might be able to make sucking motions with his or her mouth.  Your baby's movements are becoming coordinated and can be detected during ultrasound exams.  By now your baby might be more than 4 1/2 inches (120 millimeters) long from crown to rump.  Seventeen weeks into your pregnancy, or 15 weeks after conception, toenails have begun to develop. Soon fat stores begin to develop under your baby's skin. The fat will provide energy and help keep your baby warm after birth.  Eighteen weeks into your pregnancy, or 16 weeks after conception, your baby's ears begin to stand out on the sides of his or her head. Your baby might begin to hear.  By now your baby might be 5 1/2 inches (140 millimeters) long from crown to rump and weigh 7 ounces (200 grams).  Nineteen weeks into your pregnancy, or 17 weeks after conception, a greasy, cheese-like coating called vernix caseosa begins to cover your baby. The vernix caseosa helps protect your baby's delicate skin from abrasions, chapping and hardening that can result from exposure to amniotic fluid.  For girls, the uterus and vagina might begin to form this week.  [place] into your pregnancy, or 18 weeks after conception, you might be able to feel your baby's movements, also known as quickening. If you've been pregnant before, you might have begun feeling your baby's movements a few weeks ago.  By now your baby might be about 6 1/3 inches (160 millimeters) long from crown to rump.  Twenty-one weeks into your pregnancy, or 19 weeks after conception, your baby is poised to gain more weight. By this week your baby is becoming more active and is able to swallow.  Twenty-two weeks into your pregnancy, or 20 weeks after conception, your baby is completely covered with a fine, down-like hair called lanugo. The lanugo helps hold the vernix caseosa on the  skin. Your baby's eyebrows might be visible.  By now your baby might be 7 1/2 inches (190 millimeters) long from crown to rump and weigh 1 pound (460 grams).  Twenty-three weeks into your pregnancy, or 21 weeks after conception, your baby's skin is wrinkled, more translucent than before and pink to red in color.  This week your baby begins to have rapid eye movements. Your baby's tongue will soon develop taste buds. Fingerprints and footprints are forming. For boys, the testes are descending from the abdomen. For girls, the uterus and ovaries are in place -- complete with a lifetime supply of eggs.  With intensive medical care, some babies born this week might be able to survive.  Twenty-four weeks into your pregnancy, or 22 weeks after conception, your baby is regularly sleeping and waking. Real hair is growing on his or her head.  By now your baby might be about 8 inches (210 millimeters) long from crown to rump and weigh more than 1 1/3 pounds (630 grams).  Twenty-five weeks into your pregnancy, or 23 weeks after conception, your baby's hands and startle reflex are developing. Your baby might be able to respond to familiar sounds, such as your voice, with movement.  Twenty-six weeks into your pregnancy, or 24 weeks after conception, your baby has fingernails.  Your baby's lungs are beginning to produce surfactant, the substance that allows the air sacs in the lungs to inflate -- and keeps them from collapsing and sticking together when they deflate.  By now your baby might be 9 inches (230 millimeters) long from crown to rump and weigh nearly 2 pounds (820 grams).  This week marks the end of the second trimester. At 27 weeks, or 25 weeks after conception, your baby's lungs and nervous system are continuing to mature -- and he or she has likely been growing like a weed.

## 2017-03-27 NOTE — PROGRESS NOTES
Doing well  No nausea  Has u/s scheduled  The QUAD test was discussed and offered to the patient and she accepted

## 2017-03-28 LAB — GLUCOSE 1H P 50 G GLC PO SERPL-MCNC: 173 MG/DL (ref 60–129)

## 2017-03-29 ENCOUNTER — DOCUMENTATION ONLY (OUTPATIENT)
Dept: LAB | Facility: CLINIC | Age: 27
End: 2017-03-29

## 2017-03-29 DIAGNOSIS — Z32.01 PREGNANCY TEST POSITIVE: Primary | ICD-10-CM

## 2017-03-29 NOTE — PROGRESS NOTES
..Please review, associate diagnosis and sign pending laboratory orders for patient upcoming appointment on 03/30/2017. Thank You.

## 2017-03-30 DIAGNOSIS — R73.09 ELEVATED GLUCOSE TOLERANCE TEST: Primary | ICD-10-CM

## 2017-03-30 LAB
# FETUSES US: NORMAL
AFP ADJ MOM AMN: 0.68
AFP SERPL-MCNC: 19 NG/ML
AGE - REPORTED: 28.5
DATING METHOD: NORMAL
DIABETIC AT CONCEPTION: NO
FAMILY MEMBER DISEASES HX: NO
FAMILY MEMBER DISEASES HX: NO
GA METHOD: NORMAL
GA: 15.43 WK
GLUCOSE 1H P 100 G GLC PO SERPL-MCNC: 129 MG/DL (ref 60–179)
GLUCOSE 2H P 100 G GLC PO SERPL-MCNC: 98 MG/DL (ref 60–154)
GLUCOSE 3H P 100 G GLC PO SERPL-MCNC: 63 MG/DL (ref 60–139)
GLUCOSE P FAST SERPL-MCNC: 83 MG/DL (ref 60–94)
HCG MOM SERPL: 0.56
HCG SERPL-ACNC: NORMAL M[IU]/ML
HX OF HEREDITARY DISORDERS: NO
IDDM PATIENT QL: NO
INHIBIN A MOM SERPL: 0.91
INHIBIN A SERPL-MCNC: 157
INTEGRATED SCN PATIENT-IMP: NORMAL
LMP START DATE: NORMAL
PATHOLOGY STUDY: NORMAL
PREV HX CHROMOSOME ABNORMALITY: NO
SPECIMEN DRAWN SERPL: NORMAL
TWINS: NORMAL
U ESTRIOL MOM SERPL: 1.04
U ESTRIOL SERPL-MCNC: 0.75 NG/ML

## 2017-03-30 PROCEDURE — 82951 GLUCOSE TOLERANCE TEST (GTT): CPT | Performed by: FAMILY MEDICINE

## 2017-03-30 PROCEDURE — 82952 GTT-ADDED SAMPLES: CPT | Performed by: FAMILY MEDICINE

## 2017-03-30 PROCEDURE — 36415 COLL VENOUS BLD VENIPUNCTURE: CPT | Performed by: FAMILY MEDICINE

## 2017-04-20 ENCOUNTER — TELEPHONE (OUTPATIENT)
Dept: FAMILY MEDICINE | Facility: CLINIC | Age: 27
End: 2017-04-20

## 2017-04-20 NOTE — TELEPHONE ENCOUNTER
Late entry. Pt called earlier. Reports newly pregnant but having frequent urination. Wonders if OK to wait until visit with Dr. Cedeno 3/26?  Or should have this evaluated sooner?   We recommended OV today. However, pt works late so cannot be seen today. Scheduled visit tomorrow AM.   Charles Osorio RN

## 2017-04-21 ENCOUNTER — OFFICE VISIT (OUTPATIENT)
Dept: FAMILY MEDICINE | Facility: CLINIC | Age: 27
End: 2017-04-21
Payer: COMMERCIAL

## 2017-04-21 VITALS
RESPIRATION RATE: 18 BRPM | BODY MASS INDEX: 28.51 KG/M2 | HEART RATE: 102 BPM | SYSTOLIC BLOOD PRESSURE: 115 MMHG | HEIGHT: 64 IN | WEIGHT: 167 LBS | TEMPERATURE: 98.2 F | DIASTOLIC BLOOD PRESSURE: 68 MMHG | OXYGEN SATURATION: 99 %

## 2017-04-21 DIAGNOSIS — R35.0 URINARY FREQUENCY: ICD-10-CM

## 2017-04-21 DIAGNOSIS — R30.0 DYSURIA: Primary | ICD-10-CM

## 2017-04-21 LAB
ALBUMIN UR-MCNC: NEGATIVE MG/DL
APPEARANCE UR: CLEAR
BILIRUB UR QL STRIP: NEGATIVE
COLOR UR AUTO: YELLOW
GLUCOSE UR STRIP-MCNC: NEGATIVE MG/DL
HGB UR QL STRIP: NEGATIVE
KETONES UR STRIP-MCNC: NEGATIVE MG/DL
LEUKOCYTE ESTERASE UR QL STRIP: NEGATIVE
MICRO REPORT STATUS: NORMAL
NITRATE UR QL: NEGATIVE
PH UR STRIP: 7.5 PH (ref 5–7)
SP GR UR STRIP: 1.01 (ref 1–1.03)
SPECIMEN SOURCE: NORMAL
URN SPEC COLLECT METH UR: ABNORMAL
UROBILINOGEN UR STRIP-ACNC: 0.2 EU/DL (ref 0.2–1)
WET PREP SPEC: NORMAL

## 2017-04-21 PROCEDURE — 99214 OFFICE O/P EST MOD 30 MIN: CPT | Performed by: FAMILY MEDICINE

## 2017-04-21 PROCEDURE — 81003 URINALYSIS AUTO W/O SCOPE: CPT | Performed by: FAMILY MEDICINE

## 2017-04-21 PROCEDURE — 87210 SMEAR WET MOUNT SALINE/INK: CPT | Performed by: FAMILY MEDICINE

## 2017-04-21 NOTE — MR AVS SNAPSHOT
After Visit Summary   4/21/2017    Nini Wang    MRN: [id number]           Patient Information     Date Of Birth          1990        Visit Information        Provider Department      4/21/2017 9:45 AM Janice Beltran MD [facility]        Today's Diagnoses     Dysuria    -  1    Urinary frequency           Follow-ups after your visit        Your next 10 appointments already scheduled     Apr 26, 2017  9:00 AM CDT   US OB > 14 WEEKS COMPLETE SINGLE with RIUS1   [facility]    [address]  [facility] [id number] [phone]           Please bring a list of your medicines (including vitamins, minerals and over-the-counter drugs). Also, tell your doctor about any allergies you may have. Wear comfortable clothes and leave your valuables at home.  If you re less than 20 weeks drink four 8-ounce glasses of fluid an hour before your exam. If you need to empty your bladder before your exam, try to release only a little urine. Then, drink another glass of fluid.  You may have up to two family members in the exam room. If you bring a small child, an adult must be there to care for him or her.  Please call the Imaging Department at your exam site with any questions.            Apr 26, 2017  4:30 PM CDT   ESTABLISHED PRENATAL with Judit Colbert MD   [facility]    [address] [place] [address] [phone]              Who to contact     If you have questions or need follow up information about today's clinic visit or your schedule please contact [facility] directly at [phone].  Normal or non-critical lab and imaging results will be communicated to you by MyChart, letter or phone within 4 business days after the clinic has received the results. If you do not hear from us within 7 days, please contact the clinic  "through Agorafyhart or phone. If you have a critical or abnormal lab result, we will notify you by phone as soon as possible.  Submit refill requests through Zyngenia or call your pharmacy and they will forward the refill request to us. Please allow 3 business days for your refill to be completed.          Additional Information About Your Visit        Agorafyhart Information     Z[employer]enia gives you secure access to your electronic health record. If you see a primary care provider, you can also send messages to your care team and make appointments. If you have questions, please call your primary care clinic.  If you do not have a primary care provider, please call [phone] and they will assist you.        Care EveryWhere ID     This is your Care EveryWhere ID. This could be used by other organizations to access your [place] medical records  [id number]        Your Vitals Were     Pulse Temperature Respirations Height Last Period Pulse Oximetry    102 98.2  F (36.8  C) (Oral) 18 5' 4\" (1.626 m) 12/09/2016 (Exact Date) 99%    BMI (Body Mass Index)                   28.67 kg/m2            Blood Pressure from Last 3 Encounters:   04/21/17 115/68   03/27/17 112/68   02/27/17 122/64    Weight from Last 3 Encounters:   04/21/17 167 lb (75.8 kg)   03/27/17 165 lb 14.4 oz (75.3 kg)   02/27/17 164 lb (74.4 kg)              We Performed the Following     UA reflex to Microscopic and Culture     Wet prep        Primary Care Provider Office Phone # Fax #    Judit Colbert [phone]       [facility] [address] [facility] [address]        Thank you!     Thank you for choosing [facility]  for your care. Our goal is always to provide you with excellent care. Hearing back from our patients is one way we can continue to improve our services. Please take a few minutes to complete the written survey that you may receive in the mail after your visit with us. Thank you!           "   Your Updated Medication List - Protect others around you: Learn how to safely use, store and throw away your medicines at www.disposemymeds.org.          This list is accurate as of: 4/21/17 11:35 AM.  Always use your most recent med list.                   Brand Name Dispense Instructions for use    prenatal multivitamin  plus iron 27-0.8 MG Tabs per tablet     100 tablet    Take 1 tablet by mouth daily

## 2017-04-21 NOTE — NURSING NOTE
"Chief Complaint   Patient presents with     UTI     burning and itching        Initial /68 (BP Location: Right arm, Patient Position: Chair, Cuff Size: Adult Regular)  Pulse 102  Temp 98.2  F (36.8  C) (Oral)  Resp 18  Ht 5' 4\" (1.626 m)  Wt 167 lb (75.8 kg)  LMP 12/09/2016 (Exact Date)  SpO2 99%  BMI 28.67 kg/m2 Estimated body mass index is 28.67 kg/(m^2) as calculated from the following:    Height as of this encounter: 5' 4\" (1.626 m).    Weight as of this encounter: 167 lb (75.8 kg).  Medication Reconciliation: complete   "

## 2017-04-21 NOTE — PROGRESS NOTES
SUBJECTIVE:                                                    Nini Wang is a 27 year old female who presents to clinic today for the following health issues:      URINARY TRACT SYMPTOMS     Onset: 1 week     Description:   Painful urination (Dysuria): no   Blood in urine (Hematuria): no   Delay in urine (Hesitency): YES    Intensity: moderate    Progression of Symptoms:  same    Accompanying Signs & Symptoms:  Fever/chills: no   Flank pain YES  Nausea and vomiting: no   Any vaginal symptoms: vaginal itching  Abdominal/Pelvic Pain: no    History:   History of frequent UTI's: no   History of kidney stones: no   Sexually Active: YES  Possibility of pregnancy: Yes    Precipitating factors:   None          Therapies Tried and outcome: increase in water.            Problem list and histories reviewed & adjusted, as indicated.  Additional history: as documented    Patient Active Problem List   Diagnosis     Contraception     Low iron stores     Low serum sodium     Past Surgical History:   Procedure Laterality Date     NO HISTORY OF SURGERY         Social History   Substance Use Topics     Smoking status: Never Smoker     Smokeless tobacco: Never Used     Alcohol use No      Comment: not while pregnant     Family History   Problem Relation Age of Onset     Thyroid Disease Mother      GASTROINTESTINAL DISEASE Father      small bowel obstructions     Dementia Maternal Grandmother      vascular     Lung Cancer Paternal Grandmother      Pancreatic Cancer Paternal Grandfather 78         Current Outpatient Prescriptions   Medication Sig Dispense Refill     Prenatal Vit-Fe Fumarate-FA (PRENATAL MULTIVITAMIN  PLUS IRON) 27-0.8 MG TABS per tablet Take 1 tablet by mouth daily 100 tablet 3     Allergies   Allergen Reactions     Prednisone      unsure       Reviewed and updated as needed this visit by clinical staff       Reviewed and updated as needed this visit by Provider         ROS:  C: NEGATIVE for fever, chills, change  "in weight  R: NEGATIVE for significant cough or SOB  CV: NEGATIVE for chest pain, palpitations or peripheral edema    OBJECTIVE:                                                    /68 (BP Location: Right arm, Patient Position: Chair, Cuff Size: Adult Regular)  Pulse 102  Temp 98.2  F (36.8  C) (Oral)  Resp 18  Ht 5' 4\" (1.626 m)  Wt 167 lb (75.8 kg)  LMP 2016 (Exact Date)  SpO2 99%  BMI 28.67 kg/m2  Body mass index is 28.67 kg/(m^2).  GENERAL: healthy, alert and no distress  NECK: no adenopathy, no asymmetry, masses, or scars and thyroid normal to palpation  RESP: lungs clear to auscultation - no rales, rhonchi or wheezes  CV: regular rate and rhythm, normal S1 S2, no S3 or S4, no murmur, click or rub, no peripheral edema and peripheral pulses strong  ABDOMEN: , about 3 finger above the umbilicus, non tender.  MS: no gross musculoskeletal defects noted, no edema    Diagnostic Test Results:  Results for orders placed or performed in visit on 17 (from the past 24 hour(s))   UA reflex to Microscopic and Culture   Result Value Ref Range    Color Urine Yellow     Appearance Urine Clear     Glucose Urine Negative NEG mg/dL    Bilirubin Urine Negative NEG    Ketones Urine Negative NEG mg/dL    Specific Gravity Urine 1.015 1.003 - 1.035    Blood Urine Negative NEG    pH Urine 7.5 (H) 5.0 - 7.0 pH    Protein Albumin Urine Negative NEG mg/dL    Urobilinogen Urine 0.2 0.2 - 1.0 EU/dL    Nitrite Urine Negative NEG    Leukocyte Esterase Urine Negative NEG    Source Midstream Urine    Wet prep   Result Value Ref Range    Specimen Description Vagina     Wet Prep       No Trichomonas seen  No clue cells seen  No yeast seen      Micro Report Status FINAL 2017         ASSESSMENT/PLAN:                                                            1. Dysuria    - UA reflex to Microscopic and Culture    2. Urinary frequency  Mostly related to pregnancy, keep watchful monitoring,  Follow up in 5 to 7 " days if symptoms persist, sooner if symptoms worsen or new ones develops, pt may contact us over the phone for any questions or concerns.    - Wet prep        Janice Beltran MD  [facility]

## 2017-04-26 ENCOUNTER — PRENATAL OFFICE VISIT (OUTPATIENT)
Dept: FAMILY MEDICINE | Facility: CLINIC | Age: 27
End: 2017-04-26
Payer: COMMERCIAL

## 2017-04-26 ENCOUNTER — RADIANT APPOINTMENT (OUTPATIENT)
Dept: ULTRASOUND IMAGING | Facility: CLINIC | Age: 27
End: 2017-04-26
Attending: FAMILY MEDICINE
Payer: COMMERCIAL

## 2017-04-26 VITALS
DIASTOLIC BLOOD PRESSURE: 62 MMHG | SYSTOLIC BLOOD PRESSURE: 104 MMHG | HEART RATE: 84 BPM | RESPIRATION RATE: 18 BRPM | WEIGHT: 169.3 LBS | BODY MASS INDEX: 28.9 KG/M2 | TEMPERATURE: 97.6 F | HEIGHT: 64 IN

## 2017-04-26 DIAGNOSIS — Z34.90 PRENATAL CARE, UNSPECIFIED TRIMESTER: ICD-10-CM

## 2017-04-26 DIAGNOSIS — G56.02 CARPAL TUNNEL SYNDROME OF LEFT WRIST: ICD-10-CM

## 2017-04-26 DIAGNOSIS — Z34.92 PRENATAL CARE, SECOND TRIMESTER: Primary | ICD-10-CM

## 2017-04-26 PROCEDURE — 76805 OB US >/= 14 WKS SNGL FETUS: CPT | Performed by: OBSTETRICS & GYNECOLOGY

## 2017-04-26 PROCEDURE — 99207 ZZC PRENATAL VISIT: CPT | Performed by: FAMILY MEDICINE

## 2017-04-26 NOTE — NURSING NOTE
"Chief Complaint   Patient presents with     Prenatal Care     19 weeks 5 days- pt is going on flight and has questions     Orders     Breast Pump     RECHECK     Pt was seen last week for possible UTI- test was WNL but was told to follow-up       Initial /62 (BP Location: Right arm, Patient Position: Chair, Cuff Size: Adult Regular)  Pulse 84  Temp 97.6  F (36.4  C) (Oral)  Resp 18  Ht 5' 4\" (1.626 m)  Wt 169 lb 4.8 oz (76.8 kg)  LMP 12/09/2016 (Exact Date)  BMI 29.06 kg/m2 Estimated body mass index is 29.06 kg/(m^2) as calculated from the following:    Height as of this encounter: 5' 4\" (1.626 m).    Weight as of this encounter: 169 lb 4.8 oz (76.8 kg).  Medication Reconciliation: complete     Rangel Gaxiola CMA      "

## 2017-04-26 NOTE — MR AVS SNAPSHOT
"              After Visit Summary   4/26/2017    Nini Wang    MRN: [id number]           Patient Information     Date Of Birth          1990        Visit Information        Provider Department      4/26/2017 4:30 PM Judit Colbert MD [facility]        Care Instructions            Follow-ups after your visit        Follow-up notes from your care team     Return in about 4 weeks (around 5/24/2017).      Who to contact     If you have questions or need follow up information about today's clinic visit or your schedule please contact [facility] directly at [phone].  Normal or non-critical lab and imaging results will be communicated to you by MyChart, letter or phone within 4 business days after the clinic has received the results. If you do not hear from us within 7 days, please contact the clinic through [employer] or phone. If you have a critical or abnormal lab result, we will notify you by phone as soon as possible.  Submit refill requests through [employer] or call your pharmacy and they will forward the refill request to us. Please allow 3 business days for your refill to be completed.          Additional Information About Your Visit        MyChart Information     [employer] gives you secure access to your electronic health record. If you see a primary care provider, you can also send messages to your care team and make appointments. If you have questions, please call your primary care clinic.  If you do not have a primary care provider, please call [phone] and they will assist you.        Care EveryWhere ID     This is your Care EveryWhere ID. This could be used by other organizations to access your [place] medical records  [id number]        Your Vitals Were     Pulse Temperature Respirations Height Last Period BMI (Body Mass Index)    84 97.6  F (36.4  C) (Oral) 18 5' 4\" (1.626 m) 12/09/2016 (Exact Date) 29.06 kg/m2       Blood Pressure from Last 3 Encounters: "   04/26/17 104/62   04/21/17 115/68   03/27/17 112/68    Weight from Last 3 Encounters:   04/26/17 169 lb 4.8 oz (76.8 kg)   04/21/17 167 lb (75.8 kg)   03/27/17 165 lb 14.4 oz (75.3 kg)              Today, you had the following     No orders found for display       Primary Care Provider Office Phone # Fax #    Judit Colbert [phone]       [facility] [address] [facility] [address]        Thank you!     Thank you for choosing [facility]  for your care. Our goal is always to provide you with excellent care. Hearing back from our patients is one way we can continue to improve our services. Please take a few minutes to complete the written survey that you may receive in the mail after your visit with us. Thank you!             Your Updated Medication List - Protect others around you: Learn how to safely use, store and throw away your medicines at www.disposemymeds.org.          This list is accurate as of: 4/26/17  4:56 PM.  Always use your most recent med list.                   Brand Name Dispense Instructions for use    prenatal multivitamin  plus iron 27-0.8 MG Tabs per tablet     100 tablet    Take 1 tablet by mouth daily

## 2017-04-26 NOTE — PROGRESS NOTES
Had u/s today  BOY baby  Feeling baby move for about 1-2 weeks  Passed 3 hour GTT at earlier visit - this was done due to elevated glucose at first OB visit.    Will test A1c at next visit to make sure this is still good

## 2017-05-01 PROBLEM — O44.20 MARGINAL PLACENTA PREVIA: Status: ACTIVE | Noted: 2017-05-01

## 2017-05-24 ENCOUNTER — PRENATAL OFFICE VISIT (OUTPATIENT)
Dept: FAMILY MEDICINE | Facility: CLINIC | Age: 27
End: 2017-05-24
Payer: COMMERCIAL

## 2017-05-24 VITALS
WEIGHT: 172.7 LBS | OXYGEN SATURATION: 100 % | BODY MASS INDEX: 29.49 KG/M2 | HEART RATE: 91 BPM | TEMPERATURE: 98.5 F | HEIGHT: 64 IN | RESPIRATION RATE: 16 BRPM | DIASTOLIC BLOOD PRESSURE: 70 MMHG | SYSTOLIC BLOOD PRESSURE: 124 MMHG

## 2017-05-24 DIAGNOSIS — D50.9 IRON DEFICIENCY ANEMIA DURING PREGNANCY: ICD-10-CM

## 2017-05-24 DIAGNOSIS — Z34.92 PRENATAL CARE IN SECOND TRIMESTER: Primary | ICD-10-CM

## 2017-05-24 DIAGNOSIS — O99.019 IRON DEFICIENCY ANEMIA DURING PREGNANCY: ICD-10-CM

## 2017-05-24 DIAGNOSIS — O44.20 MARGINAL PLACENTA PREVIA: ICD-10-CM

## 2017-05-24 LAB
HBA1C MFR BLD: 5.1 % (ref 4.3–6)
HGB BLD-MCNC: 9.9 G/DL (ref 11.7–15.7)

## 2017-05-24 PROCEDURE — 99207 ZZC PRENATAL VISIT: CPT | Performed by: FAMILY MEDICINE

## 2017-05-24 PROCEDURE — 86780 TREPONEMA PALLIDUM: CPT | Performed by: FAMILY MEDICINE

## 2017-05-24 PROCEDURE — 36415 COLL VENOUS BLD VENIPUNCTURE: CPT | Performed by: FAMILY MEDICINE

## 2017-05-24 PROCEDURE — 00000218 ZZHCL STATISTIC OBHBG - HEMOGLOBIN: Performed by: FAMILY MEDICINE

## 2017-05-24 PROCEDURE — 83036 HEMOGLOBIN GLYCOSYLATED A1C: CPT | Performed by: FAMILY MEDICINE

## 2017-05-24 RX ORDER — FERROUS SULFATE 325(65) MG
325 TABLET ORAL 2 TIMES DAILY
Qty: 60 TABLET | Refills: 12 | Status: SHIPPED | OUTPATIENT
Start: 2017-05-24 | End: 2017-11-06

## 2017-05-24 NOTE — PROGRESS NOTES
Marginal placenta - will get another u/s at 28 weeks  Will get A1c today - had normal GTT at earlier visit 3/30/17  Will check cervix at 28 weeks

## 2017-05-24 NOTE — MR AVS SNAPSHOT
After Visit Summary   5/24/2017    Nini Wang    MRN: [id number]           Patient Information     Date Of Birth          1990        Visit Information        Provider Department      5/24/2017 10:00 AM Judit Colbert MD [facility]        Today's Diagnoses     Prenatal care in second trimester    -  1    Marginal placenta previa          Care Instructions                Follow-ups after your visit        Future tests that were ordered for you today     Open Future Orders        Priority Expected Expires Ordered    US OB Ltd One Or More Fetus FU/Repeat Routine  5/24/2018 5/24/2017            Who to contact     If you have questions or need follow up information about today's clinic visit or your schedule please contact [facility] directly at [phone].  Normal or non-critical lab and imaging results will be communicated to you by MyChart, letter or phone within 4 business days after the clinic has received the results. If you do not hear from us within 7 days, please contact the clinic through [employer] or phone. If you have a critical or abnormal lab result, we will notify you by phone as soon as possible.  Submit refill requests through [employer] or call your pharmacy and they will forward the refill request to us. Please allow 3 business days for your refill to be completed.          Additional Information About Your Visit        MyChart Information     [employer] gives you secure access to your electronic health record. If you see a primary care provider, you can also send messages to your care team and make appointments. If you have questions, please call your primary care clinic.  If you do not have a primary care provider, please call [phone] and they will assist you.        Care EveryWhere ID     This is your Care EveryWhere ID. This could be used by other organizations to access your [place] medical records  [id number]        Your Vitals Were   "   Pulse Temperature Respirations Height Last Period Pulse Oximetry    91 98.5  F (36.9  C) (Oral) 16 5' 4\" (1.626 m) 12/09/2016 (Exact Date) 100%    BMI (Body Mass Index)                   29.64 kg/m2            Blood Pressure from Last 3 Encounters:   05/24/17 124/70   04/26/17 104/62   04/21/17 115/68    Weight from Last 3 Encounters:   05/24/17 172 lb 11.2 oz (78.3 kg)   04/26/17 169 lb 4.8 oz (76.8 kg)   04/21/17 167 lb (75.8 kg)              We Performed the Following     Anti Treponema     Hemoglobin A1c     OB hemoglobin        Primary Care Provider Office Phone # Fax #    Judit Colbert [phone]       [facility] [address] [facility] [address]        Thank you!     Thank you for choosing [facility]  for your care. Our goal is always to provide you with excellent care. Hearing back from our patients is one way we can continue to improve our services. Please take a few minutes to complete the written survey that you may receive in the mail after your visit with us. Thank you!             Your Updated Medication List - Protect others around you: Learn how to safely use, store and throw away your medicines at www.disposemymeds.org.          This list is accurate as of: 5/24/17 10:29 AM.  Always use your most recent med list.                   Brand Name Dispense Instructions for use    prenatal multivitamin  plus iron 27-0.8 MG Tabs per tablet     100 tablet    Take 1 tablet by mouth daily         "

## 2017-05-25 LAB — T PALLIDUM IGG+IGM SER QL: NEGATIVE

## 2017-06-21 ENCOUNTER — PRENATAL OFFICE VISIT (OUTPATIENT)
Dept: FAMILY MEDICINE | Facility: CLINIC | Age: 27
End: 2017-06-21
Payer: COMMERCIAL

## 2017-06-21 VITALS
HEART RATE: 89 BPM | OXYGEN SATURATION: 96 % | SYSTOLIC BLOOD PRESSURE: 115 MMHG | WEIGHT: 177.1 LBS | RESPIRATION RATE: 16 BRPM | TEMPERATURE: 98 F | BODY MASS INDEX: 30.4 KG/M2 | DIASTOLIC BLOOD PRESSURE: 68 MMHG

## 2017-06-21 DIAGNOSIS — Z34.92 PRENATAL CARE IN SECOND TRIMESTER: Primary | ICD-10-CM

## 2017-06-21 DIAGNOSIS — O44.20 MARGINAL PLACENTA PREVIA: ICD-10-CM

## 2017-06-21 DIAGNOSIS — R79.0 LOW IRON STORES: ICD-10-CM

## 2017-06-21 PROCEDURE — 99207 ZZC PRENATAL VISIT: CPT | Performed by: FAMILY MEDICINE

## 2017-06-21 NOTE — PATIENT INSTRUCTIONS
The third trimester of pregnancy can be tiring and uncomfortable. Here's help relieving symptoms -- and anxiety -- as your due date approaches.    The third trimester of pregnancy can be physically and emotionally challenging. Your baby's size and position might make it hard for you to get comfortable. You might be tired of pregnancy and eager to move on to the next stage. If you've been gearing up for your due date, you might be disappointed if it comes and goes uneventfully.  Try to remain positive as you look forward to the end of your pregnancy. Soon you'll hold your baby in your arms! Here's what to expect in the meantime.  As your baby grows, his or her movements will become more obvious. These exciting sensations are often accompanied by increasing discomfort and other third trimester pregnancy symptoms.  Continued breast growth  By your due date, you might have an additional 2 pounds (nearly 1 kilogram) of breast tissue. As delivery approaches, your nipples could start leaking colostrum -- the yellowish fluid that, if you breast-feed, will nourish your baby during the first few days of life.  Weight gain  If you had a normal BMI before pregnancy, you might gain 25 to 35 pounds (about 11 to 16 kilograms) before giving birth. Your baby accounts for some of the weight gain, but so do the placenta, amniotic fluid, larger breasts and uterus, extra fat stores, and increased blood and fluid volume.  [place] contractions  These contractions are warm-ups for the real thing. They're usually weak and come and go unpredictably. True labor contractions get longer, stronger and closer together. If you're having contractions that are painful or regular, contact your health care provider.  Backaches  As your baby continues to gain weight, pregnancy hormones relax the joints between the bones in your pelvic area. These changes can be tough on your back.  When you sit, choose chairs with good back support. Apply a  heating pad or ice pack to the painful area. Ask your partner for a massage. Wear low-heeled -- but not flat -- shoes with good arch support. If the back pain doesn't go away or is accompanied by other signs and symptoms, contact your health care provider.  Shortness of breath  You might get winded easily as your uterus expands beneath your diaphragm, the muscle just below your lungs. Practice good posture to give your lungs more room to expand.  Heartburn  To keep heartburn at bay, eat small meals and drink plenty of fluids between meals. Avoid fried foods, citrus fruits or juices, and spicy foods. If these tips don't help, ask your health care provider about antacids.  Swelling  As your growing uterus puts pressure on the veins that return blood from your feet and legs, swollen feet and ankles might become an issue. Swelling in your legs, arms or hands can place pressure on nerves, causing tingling or numbness.  To reduce swelling, frequently prop up your legs and don't sit with your legs crossed. If you have to stand for long periods, try to move around often.  Spider veins, varicose veins and hemorrhoids  Increased blood circulation might cause tiny red veins, known as spider veins, to appear on your skin. You might also notice blue or reddish lines (varicose veins) beneath the surface of the skin, particularly in the legs. Varicose veins in your rectum (hemorrhoids) are another possibility.  If you have painful varicose veins, elevate your legs frequently and wear support stockings. To prevent hemorrhoids, avoid constipation. Include plenty of fiber in your diet and drink lots of fluids.  Frequent urination  As your baby moves deeper into your pelvis, you'll feel more pressure on your bladder. You might find yourself urinating more often. This extra pressure might also cause you to leak urine -- especially when you laugh, cough or sneeze. If you're worried about leaking urine, panty liners can offer a sense of  security.  Continue to watch for signs of a urinary tract infection, such as urinating even more than usual, pain during urination, fever or backache. Left untreated, urinary infections increase the risk of pregnancy complications.  Vaginal discharge  Potentially heavy vaginal discharge is common at the end of pregnancy. If you saturate a panty liner within a few hours or wonder if the discharge is leaking amniotic fluid, contact your health care provider.  As anticipation grows, fears about childbirth might become more persistent. How much will it hurt? How long will it last? How will I cope?  If you haven't done so already, consider taking childbirth classes. You'll learn what to expect -- and meet other moms-to-be who share your excitement and concerns. Talk with women who've had positive birth experiences, and ask your health care provider about options for pain relief. Tell yourself that you'll simply do the best you can. There's no right or wrong way to have a baby.  The reality of parenthood might begin to sink in as well. You might feel anxious and overwhelmed, especially if this is your first baby. To stay calm, revel in the experience of being pregnant and think about the ander that will come from loving a new human being. Consider:  Writing your thoughts in a journal   Talking to your baby   Taking photos of your pregnant belly to share with your child one day  It's also helpful to plan ahead. If you'll be breast-feeding, you might get a nursing bra or a breast pump. If you're expecting a boy -- or you don't know your baby's sex -- think about what's right for your family regarding circumcision. Consider who'll be your baby's principal health care provider. Make plans for your first few weeks together.  During the third trimester, your health care provider might ask you to come in for more frequent checkups -- perhaps every other week beginning at week 28 or 32 and every week beginning at week 36.  Like  previous visits, your health care provider will check your weight and blood pressure and ask about any signs or symptoms you're experiencing. Regardless of your vaccination status, one dose of Tdap vaccine is recommended during each pregnancy -- ideally during the third trimester. This can help protect your baby from whooping cough before he or she can be vaccinated. You also might need screening tests for various conditions, including:  Gestational diabetes. This is a type of diabetes that sometimes develops during pregnancy. Prompt treatment and healthy lifestyle choices can help you manage your blood sugar level and deliver a healthy baby.   Anemia. Anemia is an abnormally low level of red blood cells or hemoglobin, a protein in red blood cells that contains iron. Severe anemia can slow your baby's growth. To treat anemia, you might need to take iron supplements.   Group B strep. Group B strep is a type of bacteria that can live in your vagina or rectum. It won't make you sick, but it could cause a serious infection for your baby after birth. If you test positive for group B strep, your health care provider will likely recommend antibiotics while you're in labor.  Your health care provider will also check your baby's size and heart rate. Near the end of your pregnancy, vaginal exams can help your health care provider determine your baby's position inside your uterus. He or she might also check your cervix to see whether it's begun to soften or dilate in preparation for birth -- although cervical exams aren't a reliable way to predict when labor will begin.  If you have specific desires or preferences for labor and birth -- such as laboring in water or avoiding medication -- you might want to define your wishes in a birth plan. Review the plan with your health care provider ahead of time to prevent any misunderstandings.  As your due date approaches, keep asking questions. How can I tell the difference between  false labor and the real thing? When do I need to go to the hospital? Could I be too late for an epidural? Remember, there's no silly question. Understanding what's happening can help you have the most positive birth experience.

## 2017-06-21 NOTE — NURSING NOTE
"Chief Complaint   Patient presents with     Prenatal Care     27weeks, 5 days       Initial /68 (BP Location: Left arm, Patient Position: Chair, Cuff Size: Adult Regular)  Pulse 89  Temp 98  F (36.7  C) (Oral)  Resp 16  Wt 177 lb 1.6 oz (80.3 kg)  LMP 12/09/2016 (Exact Date)  SpO2 96%  Breastfeeding? No  BMI 30.4 kg/m2 Estimated body mass index is 30.4 kg/(m^2) as calculated from the following:    Height as of 5/24/17: 5' 4\" (1.626 m).    Weight as of this encounter: 177 lb 1.6 oz (80.3 kg).  Medication Reconciliation: complete Carolian Squires M.A.  "

## 2017-06-21 NOTE — PROGRESS NOTES
Started iron last time for HGB of 9.9  Has u/s next week for marginal placenta  Doing well with no concerns  Occasional minimal edema at end of day  No contractions

## 2017-06-28 ENCOUNTER — RADIANT APPOINTMENT (OUTPATIENT)
Dept: ULTRASOUND IMAGING | Facility: CLINIC | Age: 27
End: 2017-06-28
Attending: FAMILY MEDICINE
Payer: COMMERCIAL

## 2017-06-28 DIAGNOSIS — O44.20 MARGINAL PLACENTA PREVIA: ICD-10-CM

## 2017-06-28 DIAGNOSIS — Z34.92 PRENATAL CARE IN SECOND TRIMESTER: ICD-10-CM

## 2017-06-28 PROCEDURE — 76816 OB US FOLLOW-UP PER FETUS: CPT | Performed by: OBSTETRICS & GYNECOLOGY

## 2017-06-30 ENCOUNTER — TELEPHONE (OUTPATIENT)
Dept: FAMILY MEDICINE | Facility: CLINIC | Age: 27
End: 2017-06-30

## 2017-06-30 NOTE — TELEPHONE ENCOUNTER
Patient calling for U/S results.  Dr. Cedeno out til Monday.  Does not appear Dr. Tamez is in today.  Advised no one has reviewed but gave results and advised would route to Dr. Cedeno for Monday as well.  Going out of town this week-end and wants to know if needs to take any special precautions.  Advised I would consult Dr. Gotti and let her know IF anything she needs to do different.      Discussed with Dr. Gotti.  Wants to make sure hospital available where she is going.  Advised pelvic rest.  Be seen if any bleeding.  Called patient and states will be 30-45 minutes from hospital.  Advised of pelvic rest and visit if bleeding.  Advised not to let anyone do pelvic exam per Dr. Gotti if does need visit.  Patient agrees with plan.  Jessica Conti RN      Limited transabdominal and transvaginal obstetric ultrasound.         Adequate interval growth, EFW 59% tile, normal amniotic fluid index.         Breech presentation noted.     Posterior, low-lying placenta noted; recommend repeat scan in 3-4 weeks for placental location.    Adequate cervical length noted.           Lili Avina M.D.

## 2017-07-05 ENCOUNTER — PRENATAL OFFICE VISIT (OUTPATIENT)
Dept: FAMILY MEDICINE | Facility: CLINIC | Age: 27
End: 2017-07-05
Payer: COMMERCIAL

## 2017-07-05 VITALS
BODY MASS INDEX: 30.19 KG/M2 | SYSTOLIC BLOOD PRESSURE: 108 MMHG | TEMPERATURE: 98.2 F | DIASTOLIC BLOOD PRESSURE: 54 MMHG | HEART RATE: 87 BPM | WEIGHT: 175.9 LBS

## 2017-07-05 DIAGNOSIS — Z34.93 PRENATAL CARE, THIRD TRIMESTER: Primary | ICD-10-CM

## 2017-07-05 PROCEDURE — 90471 IMMUNIZATION ADMIN: CPT | Performed by: FAMILY MEDICINE

## 2017-07-05 PROCEDURE — 99207 ZZC PRENATAL VISIT: CPT | Performed by: FAMILY MEDICINE

## 2017-07-05 PROCEDURE — 90715 TDAP VACCINE 7 YRS/> IM: CPT | Performed by: FAMILY MEDICINE

## 2017-07-05 NOTE — NURSING NOTE
"Chief Complaint   Patient presents with     Prenatal Care     29 weeks 5 days- due for tdap     Results     from ultrasound last week       Initial /54 (BP Location: Right arm, Patient Position: Chair, Cuff Size: Adult Regular)  Pulse 87  Temp 98.2  F (36.8  C) (Oral)  Wt 175 lb 14.4 oz (79.8 kg)  LMP 12/09/2016 (Exact Date)  BMI 30.19 kg/m2 Estimated body mass index is 30.19 kg/(m^2) as calculated from the following:    Height as of 5/24/17: 5' 4\" (1.626 m).    Weight as of this encounter: 175 lb 14.4 oz (79.8 kg).  Medication Reconciliation: complete     Rangel Gaxiola CMA      "

## 2017-07-05 NOTE — MR AVS SNAPSHOT
After Visit Summary   7/5/2017    Nini Wang    MRN: [id number]           Patient Information     Date Of Birth          1990        Visit Information        Provider Department      7/5/2017 9:00 AM Judit Colbert MD [facility]        Care Instructions                Follow-ups after your visit        Your next 10 appointments already scheduled     Jul 26, 2017  9:00 AM CDT   US OB SINGLE FOLLOW UP REPEAT with RIUS1   [facility]    [address]  [address]  [facility] [address] [phone]           Please bring a list of your medicines (including vitamins, minerals and over-the-counter drugs). Also, tell your doctor about any allergies you may have. Wear comfortable clothes and leave your valuables at home.  If you re less than 20 weeks drink four 8-ounce glasses of fluid an hour before your exam. If you need to empty your bladder before your exam, try to release only a little urine. Then, drink another glass of fluid.  You may have up to two family members in the exam room. If you bring a small child, an adult must be there to care for him or her.  Please call the Imaging Department at your exam site with any questions.              Who to contact     If you have questions or need follow up information about today's clinic visit or your schedule please contact [facility] directly at [phone].  Normal or non-critical lab and imaging results will be communicated to you by MyChart, letter or phone within 4 business days after the clinic has received the results. If you do not hear from us within 7 days, please contact the clinic through MyChart or phone. If you have a critical or abnormal lab result, we will notify you by phone as soon as possible.  Submit refill requests through [employer] or call your pharmacy and they will forward the refill request to us. Please allow 3 business days  for your refill to be completed.          Additional Information About Your Visit        MyChart Information     [employer] gives you secure access to your electronic health record. If you see a primary care provider, you can also send messages to your care team and make appointments. If you have questions, please call your primary care clinic.  If you do not have a primary care provider, please call [phone] and they will assist you.        Care EveryWhere ID     This is your Care EveryWhere ID. This could be used by other organizations to access your [place] medical records  [id number]        Your Vitals Were     Pulse Temperature Last Period BMI (Body Mass Index)          87 98.2  F (36.8  C) (Oral) 12/09/2016 (Exact Date) 30.19 kg/m2         Blood Pressure from Last 3 Encounters:   07/05/17 108/54   06/21/17 115/68   05/24/17 124/70    Weight from Last 3 Encounters:   07/05/17 175 lb 14.4 oz (79.8 kg)   06/21/17 177 lb 1.6 oz (80.3 kg)   05/24/17 172 lb 11.2 oz (78.3 kg)              Today, you had the following     No orders found for display       Primary Care Provider Office Phone # Fax #    Judit Colbert [phone]       [facility] [id number] [place] [address]        Equal Access to Services     ABIODUN FERGUSON : Hadii aad ku hadasho Soomaali, waaxda luqadaha, qaybta kaalmada adeegyada, oniel jimenez. So [facility] [phone].    ATENCIÓN: Si habla español, tiene a miller disposición servicios gratuitos de asistencia lingüística. Llame al [phone].    We comply with applicable federal civil rights laws and [place] laws. We do not discriminate on the basis of race, color, national origin, age, disability sex, sexual orientation or gender identity.            Thank you!     Thank you for choosing [facility]  for your care. Our goal is always to provide you with excellent care. Hearing back from our patients is one way we  can continue to improve our services. Please take a few minutes to complete the written survey that you may receive in the mail after your visit with us. Thank you!             Your Updated Medication List - Protect others around you: Learn how to safely use, store and throw away your medicines at www.disposemymeds.org.          This list is accurate as of: 7/5/17  9:24 AM.  Always use your most recent med list.                   Brand Name Dispense Instructions for use Diagnosis    ferrous sulfate 325 (65 FE) MG tablet    IRON    60 tablet    Take 1 tablet (325 mg) by mouth 2 times daily    Iron deficiency anemia during pregnancy       prenatal multivitamin  plus iron 27-0.8 MG Tabs per tablet     100 tablet    Take 1 tablet by mouth daily    Amenorrhea

## 2017-07-05 NOTE — PROGRESS NOTES
Screening Questionnaire for Adult Immunization    Are you sick today?   No   Do you have allergies to medications, food, a vaccine component or latex?   Yes- Prednisone   Have you ever had a serious reaction after receiving a vaccination?   No   Do you have a long-term health problem with heart disease, lung disease, asthma, kidney disease, metabolic disease (e.g. diabetes), anemia, or other blood disorder?   Yes- asthma, anemia   Do you have cancer, leukemia, HIV/AIDS, or any other immune system problem?   No   In the past 3 months, have you taken medications that affect  your immune system, such as prednisone, other steroids, or anticancer drugs; drugs for the treatment of rheumatoid arthritis, Crohn s disease, or psoriasis; or have you had radiation treatments?   No   Have you had a seizure, or a brain or other nervous system problem?   No   During the past year, have you received a transfusion of blood or blood     products, or been given immune (gamma) globulin or antiviral drug?   No   For women: Are you pregnant or is there a chance you could become        pregnant during the next month?   Yes- reason for tdap   Have you received any vaccinations in the past 4 weeks?   No     Immunization questionnaire reviewed. MD aware of answers..      MNVFC doesn't apply on this patient    Per orders of Dr. Colbert, injection of tdap given by Mica Gaxiola. Patient instructed to remain in clinic for 15 minutes afterwards, and to report any adverse reaction to me immediately.       Screening performed by Mica Gaxiola on 7/5/2017

## 2017-07-05 NOTE — PROGRESS NOTES
Had repeat u/s and placenta is low lying but not marginal - repeat in one month - 1.5 cm from os - u/s in 4/2017 showed marginal so now it is further away from os  tdap today

## 2017-07-19 ENCOUNTER — PRENATAL OFFICE VISIT (OUTPATIENT)
Dept: FAMILY MEDICINE | Facility: CLINIC | Age: 27
End: 2017-07-19
Payer: COMMERCIAL

## 2017-07-19 VITALS
BODY MASS INDEX: 30.13 KG/M2 | OXYGEN SATURATION: 98 % | HEIGHT: 64 IN | DIASTOLIC BLOOD PRESSURE: 51 MMHG | TEMPERATURE: 98 F | SYSTOLIC BLOOD PRESSURE: 92 MMHG | HEART RATE: 76 BPM | WEIGHT: 176.5 LBS

## 2017-07-19 DIAGNOSIS — Z34.93 PRENATAL CARE, THIRD TRIMESTER: Primary | ICD-10-CM

## 2017-07-19 DIAGNOSIS — O44.20 MARGINAL PLACENTA PREVIA: ICD-10-CM

## 2017-07-19 PROCEDURE — 99207 ZZC PRENATAL VISIT: CPT | Performed by: FAMILY MEDICINE

## 2017-07-19 NOTE — PROGRESS NOTES
Doing well  No contractions  No swelling  Had tdap last time  Having u/s next week for marginal previa 1.5 cm from os

## 2017-07-19 NOTE — MR AVS SNAPSHOT
After Visit Summary   7/19/2017    Nini Wang    MRN: [id number]           Patient Information     Date Of Birth          1990        Visit Information        Provider Department      7/19/2017 10:00 AM Judit Colbert MD [facility]         Follow-ups after your visit        Follow-up notes from your care team     Return in about 2 weeks (around 8/2/2017).      Your next 10 appointments already scheduled     Jul 26, 2017  9:00 AM CDT   US OB SINGLE FOLLOW UP REPEAT with RIUS1   [facility]    [address]  [address]  [facility] [address] [phone]           Please bring a list of your medicines (including vitamins, minerals and over-the-counter drugs). Also, tell your doctor about any allergies you may have. Wear comfortable clothes and leave your valuables at home.  If you re less than 20 weeks drink four 8-ounce glasses of fluid an hour before your exam. If you need to empty your bladder before your exam, try to release only a little urine. Then, drink another glass of fluid.  You may have up to two family members in the exam room. If you bring a small child, an adult must be there to care for him or her.  Please call the Imaging Department at your exam site with any questions.              Who to contact     If you have questions or need follow up information about today's clinic visit or your schedule please contact [facility] directly at [phone].  Normal or non-critical lab and imaging results will be communicated to you by MyChart, letter or phone within 4 business days after the clinic has received the results. If you do not hear from us within 7 days, please contact the clinic through MyChart or phone. If you have a critical or abnormal lab result, we will notify you by phone as soon as possible.  Submit refill requests through [employer] or call your pharmacy and they will  "forward the refill request to us. Please allow 3 business days for your refill to be completed.          Additional Information About Your Visit        CrossChxhart Information     [employer] gives you secure access to your electronic health record. If you see a primary care provider, you can also send messages to your care team and make appointments. If you have questions, please call your primary care clinic.  If you do not have a primary care provider, please call [phone] and they will assist you.        Care EveryWhere ID     This is your Care EveryWhere ID. This could be used by other organizations to access your [place] medical records  [id number]        Your Vitals Were     Pulse Temperature Height Last Period Pulse Oximetry BMI (Body Mass Index)    76 98  F (36.7  C) (Oral) 5' 4\" (1.626 m) 12/09/2016 (Exact Date) 98% 30.3 kg/m2       Blood Pressure from Last 3 Encounters:   07/19/17 92/51   07/05/17 108/54   06/21/17 115/68    Weight from Last 3 Encounters:   07/19/17 176 lb 8 oz (80.1 kg)   07/05/17 175 lb 14.4 oz (79.8 kg)   06/21/17 177 lb 1.6 oz (80.3 kg)              Today, you had the following     No orders found for display       Primary Care Provider Office Phone # Fax #    Judit Colbert [phone]       [facility] [address] [facility] [address]        Equal Access to Services     ABIODUN FERGUSON : Hadii aad ku hadasho Soomaali, waaxda luqadaha, qaybta kaalmada adeegyada, oniel christiansen ademaddi hodge . So [facility] [phone].    ATENCIÓN: Si smitala eloy, tiene a miller disposición servicios gratuitos de asistencia lingüística. Llame al [phone].    We comply with applicable federal civil rights laws and [place] laws. We do not discriminate on the basis of race, color, national origin, age, disability sex, sexual orientation or gender identity.            Thank you!     Thank you for choosing [facility]  for your care. Our goal is " always to provide you with excellent care. Hearing back from our patients is one way we can continue to improve our services. Please take a few minutes to complete the written survey that you may receive in the mail after your visit with us. Thank you!             Your Updated Medication List - Protect others around you: Learn how to safely use, store and throw away your medicines at www.disposemymeds.org.          This list is accurate as of: 7/19/17 10:20 AM.  Always use your most recent med list.                   Brand Name Dispense Instructions for use Diagnosis    ferrous sulfate 325 (65 FE) MG tablet    IRON    60 tablet    Take 1 tablet (325 mg) by mouth 2 times daily    Iron deficiency anemia during pregnancy       prenatal multivitamin  plus iron 27-0.8 MG Tabs per tablet     100 tablet    Take 1 tablet by mouth daily    Amenorrhea

## 2017-07-19 NOTE — NURSING NOTE
"Chief Complaint   Patient presents with     Prenatal Care     31 weeks 5days        Initial BP 92/51 (BP Location: Right arm, Patient Position: Chair, Cuff Size: Adult Regular)  Pulse 76  Temp 98  F (36.7  C) (Oral)  Ht 5' 4\" (1.626 m)  Wt 176 lb 8 oz (80.1 kg)  LMP 12/09/2016 (Exact Date)  SpO2 98%  BMI 30.3 kg/m2 Estimated body mass index is 30.3 kg/(m^2) as calculated from the following:    Height as of this encounter: 5' 4\" (1.626 m).    Weight as of this encounter: 176 lb 8 oz (80.1 kg).  Medication Reconciliation: complete   "

## 2017-07-26 ENCOUNTER — RADIANT APPOINTMENT (OUTPATIENT)
Dept: ULTRASOUND IMAGING | Facility: CLINIC | Age: 27
End: 2017-07-26
Attending: FAMILY MEDICINE
Payer: COMMERCIAL

## 2017-07-26 DIAGNOSIS — O44.20 MARGINAL PLACENTA PREVIA: ICD-10-CM

## 2017-07-26 PROCEDURE — 76816 OB US FOLLOW-UP PER FETUS: CPT | Performed by: OBSTETRICS & GYNECOLOGY

## 2017-08-02 ENCOUNTER — TELEPHONE (OUTPATIENT)
Dept: FAMILY MEDICINE | Facility: CLINIC | Age: 27
End: 2017-08-02

## 2017-08-02 ENCOUNTER — PRENATAL OFFICE VISIT (OUTPATIENT)
Dept: FAMILY MEDICINE | Facility: CLINIC | Age: 27
End: 2017-08-02
Payer: COMMERCIAL

## 2017-08-02 VITALS
BODY MASS INDEX: 30.42 KG/M2 | HEART RATE: 84 BPM | DIASTOLIC BLOOD PRESSURE: 68 MMHG | HEIGHT: 64 IN | OXYGEN SATURATION: 97 % | TEMPERATURE: 98.6 F | WEIGHT: 178.2 LBS | RESPIRATION RATE: 16 BRPM | SYSTOLIC BLOOD PRESSURE: 111 MMHG

## 2017-08-02 DIAGNOSIS — Z34.93 PRENATAL CARE IN THIRD TRIMESTER: Primary | ICD-10-CM

## 2017-08-02 PROCEDURE — 99207 ZZC PRENATAL VISIT: CPT | Performed by: FAMILY MEDICINE

## 2017-08-02 NOTE — NURSING NOTE
"Chief Complaint   Patient presents with     Prenatal Care     33 weeks and 5 days        Initial /68 (BP Location: Right arm, Patient Position: Chair, Cuff Size: Adult Large)  Pulse 84  Temp 98.6  F (37  C) (Oral)  Resp 16  Ht 5' 4\" (1.626 m)  Wt 178 lb 3.2 oz (80.8 kg)  LMP 12/09/2016 (Exact Date)  SpO2 97%  BMI 30.59 kg/m2 Estimated body mass index is 30.59 kg/(m^2) as calculated from the following:    Height as of this encounter: 5' 4\" (1.626 m).    Weight as of this encounter: 178 lb 3.2 oz (80.8 kg).  Medication Reconciliation: complete   "

## 2017-08-02 NOTE — TELEPHONE ENCOUNTER
Patient was seen today by Dr. Colbert. Patient was told to schedule next appointment for 2 weeks. Was unable to find time with Dr. Colbert exactly at 2 weeks so I scheduled patient for 8/14/17 at 6:30 with Dr. Colbert. Forwarding onto Dr. Pozo team to figure out if this will work or not. Checked Dr. James schedule and nothing worked with the patients schedule.    Please call patient back to reschedule if this does not work for time frame of prenatal 2 week follow up.

## 2017-08-02 NOTE — MR AVS SNAPSHOT
"              After Visit Summary   8/2/2017    Nini Wang    MRN: [id number]           Patient Information     Date Of Birth          1990        Visit Information        Provider Department      8/2/2017 9:30 AM Judit Colebrt MD [facility]         Follow-ups after your visit        Follow-up notes from your care team     Return in about 2 weeks (around 8/16/2017).      Who to contact     If you have questions or need follow up information about today's clinic visit or your schedule please contact [facility] directly at [phone].  Normal or non-critical lab and imaging results will be communicated to you by MyChart, letter or phone within 4 business days after the clinic has received the results. If you do not hear from us within 7 days, please contact the clinic through [employer] or phone. If you have a critical or abnormal lab result, we will notify you by phone as soon as possible.  Submit refill requests through [employer] or call your pharmacy and they will forward the refill request to us. Please allow 3 business days for your refill to be completed.          Additional Information About Your Visit        MyChart Information     [employer] gives you secure access to your electronic health record. If you see a primary care provider, you can also send messages to your care team and make appointments. If you have questions, please call your primary care clinic.  If you do not have a primary care provider, please call [phone] and they will assist you.        Care EveryWhere ID     This is your Care EveryWhere ID. This could be used by other organizations to access your [place] medical records  [id number]        Your Vitals Were     Pulse Temperature Respirations Height Last Period Pulse Oximetry    84 98.6  F (37  C) (Oral) 16 5' 4\" (1.626 m) 12/09/2016 (Exact Date) 97%    BMI (Body Mass Index)                   30.59 kg/m2            Blood Pressure from Last 3 " Encounters:   08/02/17 111/68   07/19/17 92/51   07/05/17 108/54    Weight from Last 3 Encounters:   08/02/17 178 lb 3.2 oz (80.8 kg)   07/19/17 176 lb 8 oz (80.1 kg)   07/05/17 175 lb 14.4 oz (79.8 kg)              Today, you had the following     No orders found for display       Primary Care Provider Office Phone # Fax #    Judit Colbert [phone]       [facility] [address] [facility] [address]        Equal Access to Services     [facility] : Hadii aad ku hadasho Soomaali, waaxda luqadaha, qaybta kaalmada adeegyada, oniel hodge . So [facility] [phone].    ATENCIÓN: Si habla español, tiene a miller disposición servicios gratuitos de asistencia lingüística. Llame al [phone].    We comply with applicable federal civil rights laws and [place] laws. We do not discriminate on the basis of race, color, national origin, age, disability sex, sexual orientation or gender identity.            Thank you!     Thank you for choosing [facility]  for your care. Our goal is always to provide you with excellent care. Hearing back from our patients is one way we can continue to improve our services. Please take a few minutes to complete the written survey that you may receive in the mail after your visit with us. Thank you!             Your Updated Medication List - Protect others around you: Learn how to safely use, store and throw away your medicines at www.disposemymeds.org.          This list is accurate as of: 8/2/17  9:52 AM.  Always use your most recent med list.                   Brand Name Dispense Instructions for use Diagnosis    ferrous sulfate 325 (65 FE) MG tablet    IRON    60 tablet    Take 1 tablet (325 mg) by mouth 2 times daily    Iron deficiency anemia during pregnancy       prenatal multivitamin  plus iron 27-0.8 MG Tabs per tablet     100 tablet    Take 1 tablet by mouth daily    Amenorrhea

## 2017-08-14 ENCOUNTER — PRENATAL OFFICE VISIT (OUTPATIENT)
Dept: FAMILY MEDICINE | Facility: CLINIC | Age: 27
End: 2017-08-14
Payer: COMMERCIAL

## 2017-08-14 VITALS
TEMPERATURE: 98.4 F | BODY MASS INDEX: 30.55 KG/M2 | WEIGHT: 178 LBS | RESPIRATION RATE: 16 BRPM | SYSTOLIC BLOOD PRESSURE: 105 MMHG | HEART RATE: 89 BPM | DIASTOLIC BLOOD PRESSURE: 61 MMHG

## 2017-08-14 DIAGNOSIS — Z34.93 PRENATAL CARE IN THIRD TRIMESTER: ICD-10-CM

## 2017-08-14 DIAGNOSIS — R79.0 LOW IRON STORES: Primary | ICD-10-CM

## 2017-08-14 LAB — HGB BLD-MCNC: 11.7 G/DL (ref 11.7–15.7)

## 2017-08-14 PROCEDURE — 99207 ZZC PRENATAL VISIT: CPT | Performed by: FAMILY MEDICINE

## 2017-08-14 PROCEDURE — 87653 STREP B DNA AMP PROBE: CPT | Performed by: FAMILY MEDICINE

## 2017-08-14 PROCEDURE — 36415 COLL VENOUS BLD VENIPUNCTURE: CPT | Performed by: FAMILY MEDICINE

## 2017-08-14 PROCEDURE — 00000218 ZZHCL STATISTIC OBHBG - HEMOGLOBIN: Performed by: FAMILY MEDICINE

## 2017-08-14 NOTE — NURSING NOTE
"Chief Complaint   Patient presents with     Prenatal Care     35-3 weeks ob visit       Initial /61 (BP Location: Right arm, Patient Position: Chair, Cuff Size: Adult Regular)  Pulse 89  Temp 98.4  F (36.9  C) (Oral)  Resp 16  Wt 178 lb (80.7 kg)  LMP 12/09/2016 (Exact Date)  Breastfeeding? No  BMI 30.55 kg/m2 Estimated body mass index is 30.55 kg/(m^2) as calculated from the following:    Height as of 8/2/17: 5' 4\" (1.626 m).    Weight as of this encounter: 178 lb (80.7 kg).  Medication Reconciliation: complete     Leora Espitia/[facility]      "

## 2017-08-14 NOTE — MR AVS SNAPSHOT
After Visit Summary   8/14/2017    Nini Wang    MRN: [id number]           Patient Information     Date Of Birth          1990        Visit Information        Provider Department      8/14/2017 6:30 PM Judit Colbert MD [facility]        Today's Diagnoses     Low iron stores    -  1    Prenatal care in third trimester           Follow-ups after your visit        Who to contact     If you have questions or need follow up information about today's clinic visit or your schedule please contact [facility] directly at [phone].  Normal or non-critical lab and imaging results will be communicated to you by Ba[employer], letter or phone within 4 business days after the clinic has received the results. If you do not hear from us within 7 days, please contact the clinic through [employer] or phone. If you have a critical or abnormal lab result, we will notify you by phone as soon as possible.  Submit refill requests through [employer] or call your pharmacy and they will forward the refill request to us. Please allow 3 business days for your refill to be completed.          Additional Information About Your Visit        MyChart Information     [employer] gives you secure access to your electronic health record. If you see a primary care provider, you can also send messages to your care team and make appointments. If you have questions, please call your primary care clinic.  If you do not have a primary care provider, please call [phone] and they will assist you.        Care EveryWhere ID     This is your Care EveryWhere ID. This could be used by other organizations to access your [place] medical records  [id number]        Your Vitals Were     Pulse Temperature Respirations Last Period Breastfeeding? BMI (Body Mass Index)    89 98.4  F (36.9  C) (Oral) 16 12/09/2016 (Exact Date) No 30.55 kg/m2       Blood Pressure from Last 3 Encounters:   08/14/17 105/61   08/02/17  111/68   07/19/17 92/51    Weight from Last 3 Encounters:   08/14/17 178 lb (80.7 kg)   08/02/17 178 lb 3.2 oz (80.8 kg)   07/19/17 176 lb 8 oz (80.1 kg)              We Performed the Following     Group B strep PCR     OB hemoglobin        Primary Care Provider Office Phone # Fax #    Judit DION Colbert [phone] [address] [facility] [address]        Equal Access to Services     ABIODUN FERGUSON : Hadii aad ku hadasho Soomaali, waaxda luqadaha, qaybta kaalmada adeegyada, waxay yanetin hayherlinda hodge . So [facility] [phone].    ATENCIÓN: Si habla español, tiene a miller disposición servicios gratuitos de asistencia lingüística. Llame al [phone].    We comply with applicable federal civil rights laws and [place] laws. We do not discriminate on the basis of race, color, national origin, age, disability sex, sexual orientation or gender identity.            Thank you!     Thank you for choosing [facility]  for your care. Our goal is always to provide you with excellent care. Hearing back from our patients is one way we can continue to improve our services. Please take a few minutes to complete the written survey that you may receive in the mail after your visit with us. Thank you!             Your Updated Medication List - Protect others around you: Learn how to safely use, store and throw away your medicines at www.disposemymeds.org.          This list is accurate as of: 8/14/17  6:59 PM.  Always use your most recent med list.                   Brand Name Dispense Instructions for use Diagnosis    ferrous sulfate 325 (65 FE) MG tablet    IRON    60 tablet    Take 1 tablet (325 mg) by mouth 2 times daily    Iron deficiency anemia during pregnancy       prenatal multivitamin plus iron 27-0.8 MG Tabs per tablet     100 tablet    Take 1 tablet by mouth daily    Amenorrhea

## 2017-08-16 LAB
GP B STREP DNA SPEC QL NAA+PROBE: NEGATIVE
SPECIMEN SOURCE: NORMAL

## 2017-08-30 ENCOUNTER — PRENATAL OFFICE VISIT (OUTPATIENT)
Dept: FAMILY MEDICINE | Facility: CLINIC | Age: 27
End: 2017-08-30
Payer: COMMERCIAL

## 2017-08-30 VITALS
HEART RATE: 92 BPM | TEMPERATURE: 98.7 F | SYSTOLIC BLOOD PRESSURE: 118 MMHG | DIASTOLIC BLOOD PRESSURE: 60 MMHG | BODY MASS INDEX: 31.07 KG/M2 | RESPIRATION RATE: 14 BRPM | OXYGEN SATURATION: 96 % | WEIGHT: 181 LBS

## 2017-08-30 DIAGNOSIS — Z34.93 PRENATAL CARE IN THIRD TRIMESTER: Primary | ICD-10-CM

## 2017-08-30 PROCEDURE — 99207 ZZC PRENATAL VISIT: CPT | Performed by: FAMILY MEDICINE

## 2017-08-30 NOTE — MR AVS SNAPSHOT
After Visit Summary   8/30/2017    Nini Wang    MRN: [id number]           Patient Information     Date Of Birth          1990        Visit Information        Provider Department      8/30/2017 10:30 AM Judit Colbert MD [facility]         Follow-ups after your visit        Follow-up notes from your care team     Return in about 1 week (around 9/6/2017).      Who to contact     If you have questions or need follow up information about today's clinic visit or your schedule please contact [facility] directly at [phone].  Normal or non-critical lab and imaging results will be communicated to you by [employer], letter or phone within 4 business days after the clinic has received the results. If you do not hear from us within 7 days, please contact the clinic through [employer] or phone. If you have a critical or abnormal lab result, we will notify you by phone as soon as possible.  Submit refill requests through [employer] or call your pharmacy and they will forward the refill request to us. Please allow 3 business days for your refill to be completed.          Additional Information About Your Visit        MyChart Information     [employer] gives you secure access to your electronic health record. If you see a primary care provider, you can also send messages to your care team and make appointments. If you have questions, please call your primary care clinic.  If you do not have a primary care provider, please call [phone] and they will assist you.        Care EveryWhere ID     This is your Care EveryWhere ID. This could be used by other organizations to access your [place] medical records  [id number]        Your Vitals Were     Pulse Temperature Respirations Last Period Pulse Oximetry BMI (Body Mass Index)    92 98.7  F (37.1  C) (Oral) 14 12/09/2016 (Exact Date) 96% 31.07 kg/m2       Blood Pressure from Last 3 Encounters:   08/30/17 118/60   08/14/17  105/61   08/02/17 111/68    Weight from Last 3 Encounters:   08/30/17 181 lb (82.1 kg)   08/14/17 178 lb (80.7 kg)   08/02/17 178 lb 3.2 oz (80.8 kg)              Today, you had the following     No orders found for display       Primary Care Provider Office Phone # Fax #    Judit Colbert [phone] [address] [facility] [address]        Equal Access to Services     ABIODUN FERGUSON : Hadii aad ku hadasho Soomaali, waaxda luqadaha, qaybta kaalmada adeegyada, waxay yanetin hayherlinda hodge . So [facility] [phone].    ATENCIÓN: Si habla español, tiene a miller disposición servicios gratuitos de asistencia lingüística. Llame al [phone].    We comply with applicable federal civil rights laws and [place] laws. We do not discriminate on the basis of race, color, national origin, age, disability sex, sexual orientation or gender identity.            Thank you!     Thank you for choosing [facility]  for your care. Our goal is always to provide you with excellent care. Hearing back from our patients is one way we can continue to improve our services. Please take a few minutes to complete the written survey that you may receive in the mail after your visit with us. Thank you!             Your Updated Medication List - Protect others around you: Learn how to safely use, store and throw away your medicines at www.disposemymeds.org.          This list is accurate as of: 8/30/17 10:50 AM.  Always use your most recent med list.                   Brand Name Dispense Instructions for use Diagnosis    ferrous sulfate 325 (65 FE) MG tablet    IRON    60 tablet    Take 1 tablet (325 mg) by mouth 2 times daily    Iron deficiency anemia during pregnancy       prenatal multivitamin plus iron 27-0.8 MG Tabs per tablet     100 tablet    Take 1 tablet by mouth daily    Amenorrhea

## 2017-08-30 NOTE — NURSING NOTE
"Chief Complaint   Patient presents with     Prenatal Care     37 weeks, 5 days       Initial /60 (BP Location: Right arm, Patient Position: Chair, Cuff Size: Adult Regular)  Pulse 92  Temp 98.7  F (37.1  C) (Oral)  Resp 14  Wt 181 lb (82.1 kg)  LMP 12/09/2016 (Exact Date)  SpO2 96%  BMI 31.07 kg/m2 Estimated body mass index is 31.07 kg/(m^2) as calculated from the following:    Height as of 8/2/17: 5' 4\" (1.626 m).    Weight as of this encounter: 181 lb (82.1 kg).  Medication Reconciliation: complete   Ghulam Cadena MA      "

## 2017-09-06 ENCOUNTER — PRENATAL OFFICE VISIT (OUTPATIENT)
Dept: FAMILY MEDICINE | Facility: CLINIC | Age: 27
End: 2017-09-06
Payer: COMMERCIAL

## 2017-09-06 VITALS
HEART RATE: 82 BPM | BODY MASS INDEX: 31.43 KG/M2 | OXYGEN SATURATION: 100 % | SYSTOLIC BLOOD PRESSURE: 116 MMHG | TEMPERATURE: 98.2 F | WEIGHT: 184.1 LBS | DIASTOLIC BLOOD PRESSURE: 71 MMHG | HEIGHT: 64 IN

## 2017-09-06 DIAGNOSIS — Z34.93 PRENATAL CARE IN THIRD TRIMESTER: Primary | ICD-10-CM

## 2017-09-06 PROCEDURE — 99207 ZZC PRENATAL VISIT: CPT | Performed by: FAMILY MEDICINE

## 2017-09-06 NOTE — PROGRESS NOTES
Doing well  Some more pressure - continued cramping - not regular  GBS was NEG  HGB last time 11.7

## 2017-09-06 NOTE — MR AVS SNAPSHOT
"              After Visit Summary   9/6/2017    Nini Wang    MRN: [id number]           Patient Information     Date Of Birth          1990        Visit Information        Provider Department      9/6/2017 9:00 AM Judit Colbert MD [facility]        Today's Diagnoses     Prenatal care in third trimester    -  1       Follow-ups after your visit        Who to contact     If you have questions or need follow up information about today's clinic visit or your schedule please contact [facility] directly at [phone].  Normal or non-critical lab and imaging results will be communicated to you by CodeS[employer]hart, letter or phone within 4 business days after the clinic has received the results. If you do not hear from us within 7 days, please contact the clinic through [employer] or phone. If you have a critical or abnormal lab result, we will notify you by phone as soon as possible.  Submit refill requests through [employer]ar or call your pharmacy and they will forward the refill request to us. Please allow 3 business days for your refill to be completed.          Additional Information About Your Visit        MyChart Information     [employer] gives you secure access to your electronic health record. If you see a primary care provider, you can also send messages to your care team and make appointments. If you have questions, please call your primary care clinic.  If you do not have a primary care provider, please call [phone] and they will assist you.        Care EveryWhere ID     This is your Care EveryWhere ID. This could be used by other organizations to access your [place] medical records  [id number]        Your Vitals Were     Pulse Temperature Height Last Period Pulse Oximetry BMI (Body Mass Index)    82 98.2  F (36.8  C) (Oral) 5' 4\" (1.626 m) 12/09/2016 (Exact Date) 100% 31.6 kg/m2       Blood Pressure from Last 3 Encounters:   09/06/17 116/71   08/30/17 118/60   08/14/17 " 105/61    Weight from Last 3 Encounters:   09/06/17 184 lb 1.6 oz (83.5 kg)   08/30/17 181 lb (82.1 kg)   08/14/17 178 lb (80.7 kg)              Today, you had the following     No orders found for display       Primary Care Provider Office Phone # Fax #    Judit DION Colbert [phone] [address] [facility] [address]        Equal Access to Services     ABIODUN FERGUSON : Hadii aad ku hadasho Soomaali, waaxda luqadaha, qaybta kaalmada adeegyada, waxay idiin hayaan adeeg kharash la'herlinda . So [facility] [phone].    ATENCIÓN: Si habla español, tiene a miller disposición servicios gratuitos de asistencia lingüística. [facility] [phone].    We comply with applicable federal civil rights laws and [place] laws. We do not discriminate on the basis of race, color, national origin, age, disability sex, sexual orientation or gender identity.            Thank you!     Thank you for choosing [facility]  for your care. Our goal is always to provide you with excellent care. Hearing back from our patients is one way we can continue to improve our services. Please take a few minutes to complete the written survey that you may receive in the mail after your visit with us. Thank you!             Your Updated Medication List - Protect others around you: Learn how to safely use, store and throw away your medicines at www.disposemymeds.org.          This list is accurate as of: 9/6/17  9:30 AM.  Always use your most recent med list.                   Brand Name Dispense Instructions for use Diagnosis    ferrous sulfate 325 (65 FE) MG tablet    IRON    60 tablet    Take 1 tablet (325 mg) by mouth 2 times daily    Iron deficiency anemia during pregnancy       prenatal multivitamin plus iron 27-0.8 MG Tabs per tablet     100 tablet    Take 1 tablet by mouth daily    Amenorrhea

## 2017-09-06 NOTE — NURSING NOTE
"Chief Complaint   Patient presents with     Prenatal Care     38weeks and 5 days        Initial /71 (BP Location: Right arm, Patient Position: Chair, Cuff Size: Adult Regular)  Pulse 82  Temp 98.2  F (36.8  C) (Oral)  Ht 5' 4\" (1.626 m)  Wt 184 lb 1.6 oz (83.5 kg)  LMP 12/09/2016 (Exact Date)  SpO2 100%  BMI 31.6 kg/m2 Estimated body mass index is 31.6 kg/(m^2) as calculated from the following:    Height as of this encounter: 5' 4\" (1.626 m).    Weight as of this encounter: 184 lb 1.6 oz (83.5 kg).  Medication Reconciliation: complete   "

## 2017-09-13 ENCOUNTER — PRENATAL OFFICE VISIT (OUTPATIENT)
Dept: FAMILY MEDICINE | Facility: CLINIC | Age: 27
End: 2017-09-13
Payer: COMMERCIAL

## 2017-09-13 VITALS
HEIGHT: 64 IN | SYSTOLIC BLOOD PRESSURE: 119 MMHG | OXYGEN SATURATION: 99 % | DIASTOLIC BLOOD PRESSURE: 68 MMHG | BODY MASS INDEX: 31.4 KG/M2 | WEIGHT: 183.9 LBS | HEART RATE: 86 BPM | TEMPERATURE: 98.2 F

## 2017-09-13 DIAGNOSIS — Z34.93 PRENATAL CARE, THIRD TRIMESTER: Primary | ICD-10-CM

## 2017-09-13 PROCEDURE — 99207 ZZC PRENATAL VISIT: CPT | Performed by: FAMILY MEDICINE

## 2017-09-13 NOTE — MR AVS SNAPSHOT
"              After Visit Summary   9/13/2017    Nini Wang    MRN: [id number]           Patient Information     Date Of Birth          1990        Visit Information        Provider Department      9/13/2017 10:15 AM Judit Colbert MD [facility]         Follow-ups after your visit        Follow-up notes from your care team     Return in about 1 week (around 9/20/2017).      Who to contact     If you have questions or need follow up information about today's clinic visit or your schedule please contact [facility] directly at [phone].  Normal or non-critical lab and imaging results will be communicated to you by Plan [employer]inkhart, letter or phone within 4 business days after the clinic has received the results. If you do not hear from us within 7 days, please contact the clinic through [employer] or phone. If you have a critical or abnormal lab result, we will notify you by phone as soon as possible.  Submit refill requests through [employer] or call your pharmacy and they will forward the refill request to us. Please allow 3 business days for your refill to be completed.          Additional Information About Your Visit        MyChart Information     [employer] gives you secure access to your electronic health record. If you see a primary care provider, you can also send messages to your care team and make appointments. If you have questions, please call your primary care clinic.  If you do not have a primary care provider, please call [phone] and they will assist you.        Care EveryWhere ID     This is your Care EveryWhere ID. This could be used by other organizations to access your [place] medical records  [id number]        Your Vitals Were     Pulse Temperature Height Last Period Pulse Oximetry BMI (Body Mass Index)    86 98.2  F (36.8  C) (Oral) 5' 4\" (1.626 m) 12/09/2016 (Exact Date) 99% 31.57 kg/m2       Blood Pressure from Last 3 Encounters:   09/13/17 119/68 "   09/06/17 116/71   08/30/17 118/60    Weight from Last 3 Encounters:   09/13/17 183 lb 14.4 oz (83.4 kg)   09/06/17 184 lb 1.6 oz (83.5 kg)   08/30/17 181 lb (82.1 kg)              Today, you had the following     No orders found for display       Primary Care Provider Office Phone # Fax #    Juditluz Colbert [phone] [address] [facility] [address]        Equal Access to Services     [facility]: Hadii nat pollack hadasho Soomaali, waaxda luqadaha, qaybta kaalmada adeegyaanya, oniel hodge . So [facility] [phone].    ATENCIÓN: Si habla español, tiene a miller disposición servicios gratuitos de asistencia lingüística. Llame al [phone].    We comply with applicable federal civil rights laws and [place] laws. We do not discriminate on the basis of race, color, national origin, age, disability sex, sexual orientation or gender identity.            Thank you!     Thank you for choosing [facility]  for your care. Our goal is always to provide you with excellent care. Hearing back from our patients is one way we can continue to improve our services. Please take a few minutes to complete the written survey that you may receive in the mail after your visit with us. Thank you!             Your Updated Medication List - Protect others around you: Learn how to safely use, store and throw away your medicines at www.disposemymeds.org.          This list is accurate as of: 9/13/17 10:46 AM.  Always use your most recent med list.                   Brand Name Dispense Instructions for use Diagnosis    ferrous sulfate 325 (65 FE) MG tablet    IRON    60 tablet    Take 1 tablet (325 mg) by mouth 2 times daily    Iron deficiency anemia during pregnancy       prenatal multivitamin plus iron 27-0.8 MG Tabs per tablet     100 tablet    Take 1 tablet by mouth daily    Amenorrhea

## 2017-09-13 NOTE — NURSING NOTE
"Chief Complaint   Patient presents with     Prenatal Care     39 and 5 days        Initial /68 (BP Location: Right arm, Patient Position: Chair, Cuff Size: Adult Regular)  Pulse 86  Temp 98.2  F (36.8  C) (Oral)  Ht 5' 4\" (1.626 m)  Wt 183 lb 14.4 oz (83.4 kg)  LMP 12/09/2016 (Exact Date)  SpO2 99%  BMI 31.57 kg/m2 Estimated body mass index is 31.57 kg/(m^2) as calculated from the following:    Height as of this encounter: 5' 4\" (1.626 m).    Weight as of this encounter: 183 lb 14.4 oz (83.4 kg).  Medication Reconciliation: complete   "

## 2017-09-20 ENCOUNTER — PRENATAL OFFICE VISIT (OUTPATIENT)
Dept: FAMILY MEDICINE | Facility: CLINIC | Age: 27
End: 2017-09-20
Payer: COMMERCIAL

## 2017-09-20 ENCOUNTER — HOSPITAL ENCOUNTER (OUTPATIENT)
Dept: ULTRASOUND IMAGING | Facility: CLINIC | Age: 27
Discharge: HOME | End: 2017-09-20
Attending: FAMILY MEDICINE | Admitting: FAMILY MEDICINE
Payer: COMMERCIAL

## 2017-09-20 ENCOUNTER — HOSPITAL ENCOUNTER (INPATIENT)
Facility: CLINIC | Age: 27
LOS: 3 days | Discharge: HOME | End: 2017-09-23
Attending: FAMILY MEDICINE | Admitting: FAMILY MEDICINE
Payer: COMMERCIAL

## 2017-09-20 VITALS
BODY MASS INDEX: 31.4 KG/M2 | SYSTOLIC BLOOD PRESSURE: 117 MMHG | TEMPERATURE: 98.1 F | HEIGHT: 64 IN | WEIGHT: 183.9 LBS | HEART RATE: 80 BPM | OXYGEN SATURATION: 98 % | DIASTOLIC BLOOD PRESSURE: 69 MMHG

## 2017-09-20 DIAGNOSIS — Z34.93 PRENATAL CARE, THIRD TRIMESTER: ICD-10-CM

## 2017-09-20 DIAGNOSIS — Z34.93 PRENATAL CARE, THIRD TRIMESTER: Primary | ICD-10-CM

## 2017-09-20 LAB
ABO + RH BLD: NORMAL
ABO + RH BLD: NORMAL
BLD GP AB SCN SERPL QL: NORMAL
BLOOD BANK CMNT PATIENT-IMP: NORMAL
SPECIMEN EXP DATE BLD: NORMAL

## 2017-09-20 PROCEDURE — 99207 ZZC PRENATAL VISIT: CPT | Performed by: FAMILY MEDICINE

## 2017-09-20 PROCEDURE — 76816 OB US FOLLOW-UP PER FETUS: CPT

## 2017-09-20 PROCEDURE — 86901 BLOOD TYPING SEROLOGIC RH(D): CPT | Performed by: FAMILY MEDICINE

## 2017-09-20 PROCEDURE — 86780 TREPONEMA PALLIDUM: CPT | Performed by: FAMILY MEDICINE

## 2017-09-20 PROCEDURE — 3E0P7GC INTRODUCTION OF OTHER THERAPEUTIC SUBSTANCE INTO FEMALE REPRODUCTIVE, VIA NATURAL OR ARTIFICIAL OPENING: ICD-10-PCS | Performed by: FAMILY MEDICINE

## 2017-09-20 PROCEDURE — 86900 BLOOD TYPING SEROLOGIC ABO: CPT | Performed by: FAMILY MEDICINE

## 2017-09-20 PROCEDURE — 86850 RBC ANTIBODY SCREEN: CPT | Performed by: FAMILY MEDICINE

## 2017-09-20 PROCEDURE — 12000029 ZZH R&B OB INTERMEDIATE

## 2017-09-20 PROCEDURE — 25000132 ZZH RX MED GY IP 250 OP 250 PS 637: Performed by: FAMILY MEDICINE

## 2017-09-20 RX ORDER — DIPHENHYDRAMINE HCL 25 MG
25-50 CAPSULE ORAL
Status: DISCONTINUED | OUTPATIENT
Start: 2017-09-20 | End: 2017-09-21

## 2017-09-20 RX ORDER — LIDOCAINE 40 MG/G
CREAM TOPICAL
Status: DISCONTINUED | OUTPATIENT
Start: 2017-09-20 | End: 2017-09-21

## 2017-09-20 RX ADMIN — DINOPROSTONE 10 MG: 10 INSERT VAGINAL at 21:26

## 2017-09-20 RX ADMIN — DIPHENHYDRAMINE HYDROCHLORIDE 25 MG: 25 CAPSULE ORAL at 21:45

## 2017-09-20 NOTE — NURSING NOTE
"Chief Complaint   Patient presents with     Prenatal Care     40 weeks and 5 days no alot od contractions yet        Initial /69 (BP Location: Right arm, Patient Position: Chair, Cuff Size: Adult Regular)  Pulse 80  Temp 98.1  F (36.7  C) (Oral)  Ht 5' 4\" (1.626 m)  Wt 183 lb 14.4 oz (83.4 kg)  LMP 12/09/2016 (Exact Date)  SpO2 98%  BMI 31.57 kg/m2 Estimated body mass index is 31.57 kg/(m^2) as calculated from the following:    Height as of this encounter: 5' 4\" (1.626 m).    Weight as of this encounter: 183 lb 14.4 oz (83.4 kg).  Medication Reconciliation: complete  "

## 2017-09-20 NOTE — IP AVS SNAPSHOT
MRN:[id number]                      After Visit Summary   9/20/2017    Nini Wang    MRN: [id number]           Thank you!     Thank you for choosing [facility] for your care. Our goal is always to provide you with excellent care. Hearing back from our patients is one way we can continue to improve our services. Please take a few minutes to complete the written survey that you may receive in the mail after you visit. If you would like to speak to someone directly about your visit please contact Patient Relations at [phone]. Thank you!          Patient Information     Date Of Birth          1990        Designated Caregiver       Most Recent Value    Caregiver    Will someone help with your care after discharge? no      About your hospital stay     You were admitted on:  September 20, 2017 You last received care in the:  [facility]    You were discharged on:  September 23, 2017       Who to Call     For medical emergencies, please call 911.  For non-urgent questions about your medical care, please call your primary care provider or clinic, [phone]          Attending Provider     Provider Specialty    Judit Colbert MD Family Practice       Primary Care Provider Office Phone # Fax #    Judit Colbert [phone]      Further instructions from your care team       Postpartum Vaginal Delivery Instructions    Activity       Ask family and friends for help when you need it.    Do not place anything in your vagina for 6 weeks.    You are not restricted on other activities, but take it easy for a few weeks to allow your body to recover from delivery.  You are able to do any activities you feel up to that point.    No driving until you have stopped taking your pain medications (usually two weeks after delivery).     Call your health care provider if you have any of these symptoms:       Increased pain, swelling, redness, or fluid around your  stiches from an episiotomy or perineal tear.    A fever above 100.4 F (38 C) with or without chills when placing a thermometer under your tongue.    You soak a sanitary pad with blood within 1 hour, or you see blood clots larger than a golf ball.    Bleeding that lasts more than 6 weeks.    Vaginal discharge that smells bad.    Severe pain, cramping or tenderness in your lower belly area.    A need to urinate more frequently (use the toilet more often), more urgently (use the toilet very quickly), or it burns when you urinate.    Nausea and vomiting.    Redness, swelling or pain around a vein in your leg.    Problems breastfeeding or a red or painful area on your breast.    Chest pain and cough or are gasping for air.    Problems coping with sadness, anxiety, or depression.  If you have any concerns about hurting yourself or the baby, call your provider immediately.     You have questions or concerns after you return home.     Keep your hands clean:  Always wash your hands before touching your perineal area and stitches.  This helps reduce your risk of infection.  If your hands aren't dirty, you may use an alcohol hand-rub to clean your hands. Keep your nails clean and short.        Pending Results     Date and Time Order Name Status Description    9/21/2017 2352 Genital Special Culture Aerob Bacterial Preliminary     9/21/2017 2352 Genital Special Culture Aerob Bacterial Preliminary     9/21/2017 2327 Placenta Path Order and Indications (PLACENTA) In process             Statement of Approval     Ordered          09/23/17 1012  I have reviewed and agree with all the recommendations and orders detailed in this document.  EFFECTIVE NOW     Approved and electronically signed by:  Judit Colbert MD             Admission Information     Date & Time Provider Department Dept. Phone    9/20/2017 Judit Colbert MD [facility] [phone]      Your Vitals Were     Blood Pressure Pulse Temperature Respirations  "Height Weight    110/70 70 98.3  F (36.8  C) (Oral) 16 1.626 m (5' 4\") 79.4 kg (175 lb)    Last Period Pulse Oximetry BMI (Body Mass Index)             12/09/2016 (Exact Date) 97% 30.04 kg/m2         [email or web address] Information     [employer] gives you secure access to your electronic health record. If you see a primary care provider, you can also send messages to your care team and make appointments. If you have questions, please call your primary care clinic.  If you do not have a primary care provider, please call [phone] and they will assist you.        Care EveryWhere ID     This is your Care EveryWhere ID. This could be used by other organizations to access your [place] medical records  [id number]        Equal Access to Services     ABIODUN FERGUSON : Joby Hutchinson, brant mcgovern, monica sanders, oniel jimenez. So [facility] [phone].    ATENCIÓN: Si habla español, tiene a miller disposición servicios gratuitos de asistencia lingüística. Llame al [phone].    We comply with applicable federal civil rights laws and [place] laws. We do not discriminate on the basis of race, color, national origin, age, disability sex, sexual orientation or gender identity.               Review of your medicines      START taking        Dose / Directions    docusate sodium 100 MG tablet   Commonly known as:  COLACE        Dose:  100 mg   Take 100 mg by mouth daily   Quantity:  30 tablet   Refills:  1       ibuprofen 200 MG capsule        Dose:  600 mg   Take 600 mg by mouth every 6 hours as needed for fever   Quantity:  60 capsule   Refills:  0         CONTINUE these medicines which have NOT CHANGED        Dose / Directions    ferrous sulfate 325 (65 FE) MG tablet   Commonly known as:  IRON   Used for:  Iron deficiency anemia during pregnancy        Dose:  325 mg   Take 1 tablet (325 mg) by mouth 2 times daily   Quantity:  60 tablet   Refills:  12       prenatal multivitamin plus " iron 27-0.8 MG Tabs per tablet   Used for:  Amenorrhea        Dose:  1 tablet   Take 1 tablet by mouth daily   Quantity:  100 tablet   Refills:  3            Where to get your medicines      These medications were sent to [place], [place] - [address] [facility]  [address] [facility] [address]     Phone:  [phone]     docusate sodium 100 MG tablet         Some of these will need a paper prescription and others can be bought over the counter. Ask your nurse if you have questions.     You don't need a prescription for these medications     ibuprofen 200 MG capsule                Protect others around you: Learn how to safely use, store and throw away your medicines at www.disposemymeds.org.             Medication List: This is a list of all your medications and when to take them. Check marks below indicate your daily home schedule. Keep this list as a reference.      Medications           Morning Afternoon Evening Bedtime As Needed    docusate sodium 100 MG tablet   Commonly known as:  COLACE   Take 100 mg by mouth daily   Next Dose Due:  Tomorrow 9/24/17                                ferrous sulfate 325 (65 FE) MG tablet   Commonly known as:  IRON   Take 1 tablet (325 mg) by mouth 2 times daily   Last time this was given:  325 mg on 9/23/2017  9:32 AM   Next Dose Due:  This evening 9/23/17                                ibuprofen 200 MG capsule   Take 600 mg by mouth every 6 hours as needed for fever   Next Dose Due:  You may have again at 7:00 pm.                                prenatal multivitamin plus iron 27-0.8 MG Tabs per tablet   Take 1 tablet by mouth daily   Last time this was given:  1 tablet on 9/23/2017  9:32 AM   Next Dose Due:  Tomorrow 9/24/17

## 2017-09-20 NOTE — MR AVS SNAPSHOT
After Visit Summary   9/20/2017    Nini Wang    MRN: [id number]           Patient Information     Date Of Birth          1990        Visit Information        Provider Department      9/20/2017 9:15 AM Judit Colbert MD [facility]        Today's Diagnoses     Prenatal care, third trimester    -  1       Follow-ups after your visit        Future tests that were ordered for you today     Open Future Orders        Priority Expected Expires Ordered    US OB Ltd One Or More Fetus FU/Repeat Routine 9/21/2017 9/20/2018 9/20/2017            Who to contact     If you have questions or need follow up information about today's clinic visit or your schedule please contact [facility] directly at [phone].  Normal or non-critical lab and imaging results will be communicated to you by MyChart, letter or phone within 4 business days after the clinic has received the results. If you do not hear from us within 7 days, please contact the clinic through [employer] or phone. If you have a critical or abnormal lab result, we will notify you by phone as soon as possible.  Submit refill requests through [employer] or call your pharmacy and they will forward the refill request to us. Please allow 3 business days for your refill to be completed.          Additional Information About Your Visit        MyChart Information     [employer] gives you secure access to your electronic health record. If you see a primary care provider, you can also send messages to your care team and make appointments. If you have questions, please call your primary care clinic.  If you do not have a primary care provider, please call [phone] and they will assist you.        Care EveryWhere ID     This is your Care EveryWhere ID. This could be used by other organizations to access your [place] medical records  [id number]        Your Vitals Were     Pulse Temperature Height Last Period Pulse Oximetry BMI  "(Body Mass Index)    80 98.1  F (36.7  C) (Oral) 5' 4\" (1.626 m) 12/09/2016 (Exact Date) 98% 31.57 kg/m2       Blood Pressure from Last 3 Encounters:   09/20/17 117/69   09/13/17 119/68   09/06/17 116/71    Weight from Last 3 Encounters:   09/20/17 183 lb 14.4 oz (83.4 kg)   09/13/17 183 lb 14.4 oz (83.4 kg)   09/06/17 184 lb 1.6 oz (83.5 kg)               Primary Care Provider Office Phone # Fax #    Judit Colbert [phone]  [phone]        Equal Access to Services     ABIODUN FERGUSON : Joby harmon Somaurice, waaxda luqadaha, qaybta kaalmada joseyaanya, oniel hodge . So [facility] [phone].    ATENCIÓN: Si habla español, tiene a miller disposición servicios gratuitos de asistencia lingüística. Llame al [phone].    We comply with applicable federal civil rights laws and [place] laws. We do not discriminate on the basis of race, color, national origin, age, disability sex, sexual orientation or gender identity.            Thank you!     Thank you for choosing [facility]  for your care. Our goal is always to provide you with excellent care. Hearing back from our patients is one way we can continue to improve our services. Please take a few minutes to complete the written survey that you may receive in the mail after your visit with us. Thank you!             Your Updated Medication List - Protect others around you: Learn how to safely use, store and throw away your medicines at www.disposemymeds.org.          This list is accurate as of: 9/20/17  9:57 AM.  Always use your most recent med list.                   Brand Name Dispense Instructions for use Diagnosis    ferrous sulfate 325 (65 FE) MG tablet    IRON    60 tablet    Take 1 tablet (325 mg) by mouth 2 times daily    Iron deficiency anemia during pregnancy       prenatal multivitamin plus iron 27-0.8 MG Tabs per tablet     100 tablet    Take 1 tablet by " mouth daily    Amenorrhea

## 2017-09-20 NOTE — PROGRESS NOTES
Will get u/s to verify lie since head not palpable on cervical exam this time but pretty sure lie is cephalic  Will induce friday

## 2017-09-20 NOTE — IP AVS SNAPSHOT
[facility]    [address]    [facility] [address]    Phone:  [phone]    Fax:  [phone]                                       After Visit Summary   9/20/2017    Nini Wang    MRN: [id number]           After Visit Summary Signature Page     I have received my discharge instructions, and my questions have been answered. I have discussed any challenges I see with this plan with the nurse or doctor.    ..........................................................................................................................................  Patient/Patient Representative Signature      ..........................................................................................................................................  Patient Representative Print Name and Relationship to Patient    ..................................................               ................................................  Date                                            Time    ..........................................................................................................................................  Reviewed by Signature/Title    ...................................................              ..............................................  Date                                                            Time

## 2017-09-21 ENCOUNTER — ANESTHESIA EVENT (OUTPATIENT)
Dept: OBGYN | Facility: CLINIC | Age: 27
End: 2017-09-21
Payer: COMMERCIAL

## 2017-09-21 ENCOUNTER — ANESTHESIA (OUTPATIENT)
Dept: OBGYN | Facility: CLINIC | Age: 27
End: 2017-09-21
Payer: COMMERCIAL

## 2017-09-21 PROBLEM — O41.1290 CHORIOAMNIONITIS, DELIVERED, CURRENT HOSPITALIZATION: Status: ACTIVE | Noted: 2017-09-21

## 2017-09-21 LAB
BASOPHILS # BLD AUTO: 0 10E9/L (ref 0–0.2)
BASOPHILS NFR BLD AUTO: 0.1 %
CRP SERPL-MCNC: 37.7 MG/L (ref 0–8)
DIFFERENTIAL METHOD BLD: ABNORMAL
EOSINOPHIL # BLD AUTO: 0 10E9/L (ref 0–0.7)
EOSINOPHIL NFR BLD AUTO: 0.1 %
ERYTHROCYTE [DISTWIDTH] IN BLOOD BY AUTOMATED COUNT: 14.7 % (ref 10–15)
FERN CRYSTALLIZATION: NORMAL
HCT VFR BLD AUTO: 38.3 % (ref 35–47)
HGB BLD-MCNC: 12.2 G/DL (ref 11.7–15.7)
IMM GRANULOCYTES # BLD: 0.1 10E9/L (ref 0–0.4)
IMM GRANULOCYTES NFR BLD: 0.5 %
LYMPHOCYTES # BLD AUTO: 1 10E9/L (ref 0.8–5.3)
LYMPHOCYTES NFR BLD AUTO: 6.2 %
MCH RBC QN AUTO: 24.1 PG (ref 26.5–33)
MCHC RBC AUTO-ENTMCNC: 31.9 G/DL (ref 31.5–36.5)
MCV RBC AUTO: 76 FL (ref 78–100)
MONOCYTES # BLD AUTO: 1.2 10E9/L (ref 0–1.3)
MONOCYTES NFR BLD AUTO: 7.5 %
NEUTROPHILS # BLD AUTO: 13.6 10E9/L (ref 1.6–8.3)
NEUTROPHILS NFR BLD AUTO: 85.6 %
NRBC # BLD AUTO: 0 10*3/UL
NRBC BLD AUTO-RTO: 0 /100
PLATELET # BLD AUTO: 184 10E9/L (ref 150–450)
RBC # BLD AUTO: 5.07 10E12/L (ref 3.8–5.2)
T PALLIDUM IGG+IGM SER QL: NEGATIVE
WBC # BLD AUTO: 15.9 10E9/L (ref 4–11)

## 2017-09-21 PROCEDURE — 3E0R3CZ INTRODUCTION OF REGIONAL ANESTHETIC INTO SPINAL CANAL, PERCUTANEOUS APPROACH: ICD-10-PCS | Performed by: ANESTHESIOLOGY

## 2017-09-21 PROCEDURE — 72200001 ZZH LABOR CARE VAGINAL DELIVERY SINGLE

## 2017-09-21 PROCEDURE — 25000128 H RX IP 250 OP 636

## 2017-09-21 PROCEDURE — 85025 COMPLETE CBC W/AUTO DIFF WBC: CPT | Performed by: FAMILY MEDICINE

## 2017-09-21 PROCEDURE — 25000132 ZZH RX MED GY IP 250 OP 250 PS 637: Performed by: FAMILY MEDICINE

## 2017-09-21 PROCEDURE — 88307 TISSUE EXAM BY PATHOLOGIST: CPT | Mod: 26 | Performed by: FAMILY MEDICINE

## 2017-09-21 PROCEDURE — 25000125 ZZHC RX 250: Performed by: ANESTHESIOLOGY

## 2017-09-21 PROCEDURE — 40000671 ZZH STATISTIC ANESTHESIA CASE

## 2017-09-21 PROCEDURE — 87070 CULTURE OTHR SPECIMN AEROBIC: CPT | Performed by: FAMILY MEDICINE

## 2017-09-21 PROCEDURE — 25000128 H RX IP 250 OP 636: Performed by: ANESTHESIOLOGY

## 2017-09-21 PROCEDURE — 88307 TISSUE EXAM BY PATHOLOGIST: CPT | Performed by: FAMILY MEDICINE

## 2017-09-21 PROCEDURE — 86140 C-REACTIVE PROTEIN: CPT | Performed by: FAMILY MEDICINE

## 2017-09-21 PROCEDURE — 10907ZC DRAINAGE OF AMNIOTIC FLUID, THERAPEUTIC FROM PRODUCTS OF CONCEPTION, VIA NATURAL OR ARTIFICIAL OPENING: ICD-10-PCS | Performed by: FAMILY MEDICINE

## 2017-09-21 PROCEDURE — S0020 INJECTION, BUPIVICAINE HYDRO: HCPCS | Performed by: ANESTHESIOLOGY

## 2017-09-21 PROCEDURE — 36415 COLL VENOUS BLD VENIPUNCTURE: CPT | Performed by: FAMILY MEDICINE

## 2017-09-21 PROCEDURE — 12000029 ZZH R&B OB INTERMEDIATE

## 2017-09-21 PROCEDURE — 0KQM0ZZ REPAIR PERINEUM MUSCLE, OPEN APPROACH: ICD-10-PCS | Performed by: FAMILY MEDICINE

## 2017-09-21 PROCEDURE — 25000125 ZZHC RX 250: Performed by: FAMILY MEDICINE

## 2017-09-21 PROCEDURE — 37000011 ZZH ANESTHESIA WARD SERVICE

## 2017-09-21 PROCEDURE — 25000128 H RX IP 250 OP 636: Performed by: FAMILY MEDICINE

## 2017-09-21 PROCEDURE — 59400 OBSTETRICAL CARE: CPT | Performed by: FAMILY MEDICINE

## 2017-09-21 PROCEDURE — 00HU33Z INSERTION OF INFUSION DEVICE INTO SPINAL CANAL, PERCUTANEOUS APPROACH: ICD-10-PCS | Performed by: ANESTHESIOLOGY

## 2017-09-21 RX ORDER — OXYTOCIN 10 [USP'U]/ML
10 INJECTION, SOLUTION INTRAMUSCULAR; INTRAVENOUS
Status: DISCONTINUED | OUTPATIENT
Start: 2017-09-21 | End: 2017-09-21

## 2017-09-21 RX ORDER — LIDOCAINE 40 MG/G
CREAM TOPICAL
Status: DISCONTINUED | OUTPATIENT
Start: 2017-09-21 | End: 2017-09-21

## 2017-09-21 RX ORDER — LANOLIN 100 %
OINTMENT (GRAM) TOPICAL
Status: DISCONTINUED | OUTPATIENT
Start: 2017-09-21 | End: 2017-09-23 | Stop reason: HOSPADM

## 2017-09-21 RX ORDER — OXYTOCIN/0.9 % SODIUM CHLORIDE 30/500 ML
100-340 PLASTIC BAG, INJECTION (ML) INTRAVENOUS CONTINUOUS PRN
Status: DISCONTINUED | OUTPATIENT
Start: 2017-09-21 | End: 2017-09-21

## 2017-09-21 RX ORDER — ACETAMINOPHEN 500 MG
1000 TABLET ORAL ONCE
Status: COMPLETED | OUTPATIENT
Start: 2017-09-21 | End: 2017-09-21

## 2017-09-21 RX ORDER — AMOXICILLIN 250 MG
1-2 CAPSULE ORAL 2 TIMES DAILY
Status: DISCONTINUED | OUTPATIENT
Start: 2017-09-21 | End: 2017-09-23 | Stop reason: HOSPADM

## 2017-09-21 RX ORDER — EPHEDRINE SULFATE 50 MG/ML
INJECTION, SOLUTION INTRAMUSCULAR; INTRAVENOUS; SUBCUTANEOUS
Status: DISCONTINUED
Start: 2017-09-21 | End: 2017-09-22 | Stop reason: HOSPADM

## 2017-09-21 RX ORDER — MISOPROSTOL 200 UG/1
400 TABLET ORAL
Status: DISCONTINUED | OUTPATIENT
Start: 2017-09-21 | End: 2017-09-23 | Stop reason: HOSPADM

## 2017-09-21 RX ORDER — BISACODYL 10 MG
10 SUPPOSITORY, RECTAL RECTAL DAILY PRN
Status: DISCONTINUED | OUTPATIENT
Start: 2017-09-23 | End: 2017-09-23 | Stop reason: HOSPADM

## 2017-09-21 RX ORDER — METHYLERGONOVINE MALEATE 0.2 MG/ML
200 INJECTION INTRAVENOUS
Status: DISCONTINUED | OUTPATIENT
Start: 2017-09-21 | End: 2017-09-21

## 2017-09-21 RX ORDER — HYDROMORPHONE HYDROCHLORIDE 1 MG/ML
0.5 INJECTION, SOLUTION INTRAMUSCULAR; INTRAVENOUS; SUBCUTANEOUS ONCE
Status: COMPLETED | OUTPATIENT
Start: 2017-09-21 | End: 2017-09-21

## 2017-09-21 RX ORDER — OXYTOCIN/0.9 % SODIUM CHLORIDE 30/500 ML
340 PLASTIC BAG, INJECTION (ML) INTRAVENOUS CONTINUOUS PRN
Status: DISCONTINUED | OUTPATIENT
Start: 2017-09-21 | End: 2017-09-23 | Stop reason: HOSPADM

## 2017-09-21 RX ORDER — OXYTOCIN 10 [USP'U]/ML
10 INJECTION, SOLUTION INTRAMUSCULAR; INTRAVENOUS
Status: DISCONTINUED | OUTPATIENT
Start: 2017-09-21 | End: 2017-09-23 | Stop reason: HOSPADM

## 2017-09-21 RX ORDER — AMPICILLIN 2 G/1
2 INJECTION, POWDER, FOR SOLUTION INTRAVENOUS EVERY 6 HOURS
Status: DISCONTINUED | OUTPATIENT
Start: 2017-09-21 | End: 2017-09-22

## 2017-09-21 RX ORDER — SODIUM CHLORIDE, SODIUM LACTATE, POTASSIUM CHLORIDE, CALCIUM CHLORIDE 600; 310; 30; 20 MG/100ML; MG/100ML; MG/100ML; MG/100ML
INJECTION, SOLUTION INTRAVENOUS CONTINUOUS
Status: DISCONTINUED | OUTPATIENT
Start: 2017-09-21 | End: 2017-09-21

## 2017-09-21 RX ORDER — CARBOPROST TROMETHAMINE 250 UG/ML
250 INJECTION, SOLUTION INTRAMUSCULAR
Status: DISCONTINUED | OUTPATIENT
Start: 2017-09-21 | End: 2017-09-21

## 2017-09-21 RX ORDER — NALOXONE HYDROCHLORIDE 0.4 MG/ML
.1-.4 INJECTION, SOLUTION INTRAMUSCULAR; INTRAVENOUS; SUBCUTANEOUS
Status: DISCONTINUED | OUTPATIENT
Start: 2017-09-21 | End: 2017-09-21

## 2017-09-21 RX ORDER — NALBUPHINE HYDROCHLORIDE 10 MG/ML
2.5-5 INJECTION, SOLUTION INTRAMUSCULAR; INTRAVENOUS; SUBCUTANEOUS EVERY 6 HOURS PRN
Status: DISCONTINUED | OUTPATIENT
Start: 2017-09-21 | End: 2017-09-21

## 2017-09-21 RX ORDER — IBUPROFEN 400 MG/1
400-800 TABLET, FILM COATED ORAL EVERY 6 HOURS PRN
Status: DISCONTINUED | OUTPATIENT
Start: 2017-09-21 | End: 2017-09-23 | Stop reason: HOSPADM

## 2017-09-21 RX ORDER — NALOXONE HYDROCHLORIDE 0.4 MG/ML
.1-.4 INJECTION, SOLUTION INTRAMUSCULAR; INTRAVENOUS; SUBCUTANEOUS
Status: DISCONTINUED | OUTPATIENT
Start: 2017-09-21 | End: 2017-09-23 | Stop reason: HOSPADM

## 2017-09-21 RX ORDER — FENTANYL CITRATE 50 UG/ML
50-100 INJECTION, SOLUTION INTRAMUSCULAR; INTRAVENOUS
Status: DISCONTINUED | OUTPATIENT
Start: 2017-09-21 | End: 2017-09-21

## 2017-09-21 RX ORDER — FERROUS SULFATE 325(65) MG
325 TABLET ORAL 2 TIMES DAILY
Status: DISCONTINUED | OUTPATIENT
Start: 2017-09-21 | End: 2017-09-23 | Stop reason: HOSPADM

## 2017-09-21 RX ORDER — OXYTOCIN/0.9 % SODIUM CHLORIDE 30/500 ML
1-24 PLASTIC BAG, INJECTION (ML) INTRAVENOUS CONTINUOUS
Status: DISCONTINUED | OUTPATIENT
Start: 2017-09-21 | End: 2017-09-21

## 2017-09-21 RX ORDER — EPHEDRINE SULFATE 50 MG/ML
5 INJECTION, SOLUTION INTRAMUSCULAR; INTRAVENOUS; SUBCUTANEOUS
Status: DISCONTINUED | OUTPATIENT
Start: 2017-09-21 | End: 2017-09-21

## 2017-09-21 RX ORDER — OXYCODONE AND ACETAMINOPHEN 5; 325 MG/1; MG/1
1 TABLET ORAL
Status: COMPLETED | OUTPATIENT
Start: 2017-09-21 | End: 2017-09-21

## 2017-09-21 RX ORDER — ACETAMINOPHEN 325 MG/1
650 TABLET ORAL EVERY 4 HOURS PRN
Status: DISCONTINUED | OUTPATIENT
Start: 2017-09-21 | End: 2017-09-23 | Stop reason: HOSPADM

## 2017-09-21 RX ORDER — ACETAMINOPHEN 325 MG/1
650 TABLET ORAL EVERY 4 HOURS PRN
Status: DISCONTINUED | OUTPATIENT
Start: 2017-09-21 | End: 2017-09-22

## 2017-09-21 RX ORDER — PRENATAL VIT/IRON FUM/FOLIC AC 27MG-0.8MG
1 TABLET ORAL DAILY
Status: DISCONTINUED | OUTPATIENT
Start: 2017-09-22 | End: 2017-09-23 | Stop reason: HOSPADM

## 2017-09-21 RX ORDER — IBUPROFEN 800 MG/1
800 TABLET, FILM COATED ORAL
Status: COMPLETED | OUTPATIENT
Start: 2017-09-21 | End: 2017-09-21

## 2017-09-21 RX ORDER — BUPIVACAINE HYDROCHLORIDE 2.5 MG/ML
INJECTION, SOLUTION EPIDURAL; INFILTRATION; INTRACAUDAL PRN
Status: DISCONTINUED | OUTPATIENT
Start: 2017-09-21 | End: 2017-09-21

## 2017-09-21 RX ORDER — OXYTOCIN/0.9 % SODIUM CHLORIDE 30/500 ML
100 PLASTIC BAG, INJECTION (ML) INTRAVENOUS CONTINUOUS
Status: DISCONTINUED | OUTPATIENT
Start: 2017-09-21 | End: 2017-09-23 | Stop reason: HOSPADM

## 2017-09-21 RX ORDER — ONDANSETRON 2 MG/ML
4 INJECTION INTRAMUSCULAR; INTRAVENOUS EVERY 6 HOURS PRN
Status: DISCONTINUED | OUTPATIENT
Start: 2017-09-21 | End: 2017-09-21

## 2017-09-21 RX ADMIN — ACETAMINOPHEN 1000 MG: 500 TABLET, FILM COATED ORAL at 17:54

## 2017-09-21 RX ADMIN — Medication: at 19:32

## 2017-09-21 RX ADMIN — ONDANSETRON 4 MG: 2 INJECTION INTRAMUSCULAR; INTRAVENOUS at 10:08

## 2017-09-21 RX ADMIN — SODIUM CHLORIDE, POTASSIUM CHLORIDE, SODIUM LACTATE AND CALCIUM CHLORIDE: 600; 310; 30; 20 INJECTION, SOLUTION INTRAVENOUS at 05:18

## 2017-09-21 RX ADMIN — OXYCODONE HYDROCHLORIDE AND ACETAMINOPHEN 1 TABLET: 5; 325 TABLET ORAL at 23:10

## 2017-09-21 RX ADMIN — AMPICILLIN SODIUM 2 G: 2 INJECTION, POWDER, FOR SOLUTION INTRAMUSCULAR; INTRAVENOUS at 19:07

## 2017-09-21 RX ADMIN — SODIUM CHLORIDE, POTASSIUM CHLORIDE, SODIUM LACTATE AND CALCIUM CHLORIDE: 600; 310; 30; 20 INJECTION, SOLUTION INTRAVENOUS at 07:38

## 2017-09-21 RX ADMIN — OXYTOCIN-SODIUM CHLORIDE 0.9% IV SOLN 30 UNIT/500ML 2 MILLI-UNITS/MIN: 30-0.9/5 SOLUTION at 05:23

## 2017-09-21 RX ADMIN — GENTAMICIN SULFATE 120 MG: 40 INJECTION, SOLUTION INTRAMUSCULAR; INTRAVENOUS at 19:34

## 2017-09-21 RX ADMIN — LIDOCAINE HYDROCHLORIDE 1 ML: 10 INJECTION, SOLUTION INFILTRATION; PERINEURAL at 22:30

## 2017-09-21 RX ADMIN — Medication: at 07:15

## 2017-09-21 RX ADMIN — Medication 10 ML: at 07:03

## 2017-09-21 RX ADMIN — SODIUM CHLORIDE, POTASSIUM CHLORIDE, SODIUM LACTATE AND CALCIUM CHLORIDE: 600; 310; 30; 20 INJECTION, SOLUTION INTRAVENOUS at 15:30

## 2017-09-21 RX ADMIN — BUPIVACAINE HYDROCHLORIDE 10 ML: 2.5 INJECTION, SOLUTION EPIDURAL; INFILTRATION; INTRACAUDAL at 09:00

## 2017-09-21 RX ADMIN — HYDROMORPHONE HYDROCHLORIDE 0.5 MG: 1 INJECTION, SOLUTION INTRAMUSCULAR; INTRAVENOUS; SUBCUTANEOUS at 11:16

## 2017-09-21 RX ADMIN — IBUPROFEN 800 MG: 800 TABLET, FILM COATED ORAL at 23:10

## 2017-09-21 RX ADMIN — Medication 10 ML: at 11:16

## 2017-09-21 NOTE — PLAN OF CARE
Problem: Patient Care Overview  Goal: Plan of Care/Patient Progress Review  0715:  Assumed cares on patient.  Report received from Yary BERRIOS RN.   0810:  Martinez catheter placed.  SVE done.  SVE 5/90/-2.  Will continue to monitor.   0845:  Patient states she is having some lower abdominal pain.  Patient requesting more medicine for pain. Dr. Brothers paged to come give patient a rebolus.   0900:  Dr. Brothers here to give rebolus.  Will continue to monitor closely.   0945:  Dr. Colbert updated on patients status.  Plan for Dr. Colbert to come see patient in about 30 min. No new orders received.   1105:  Patient states she is starting to feel contraction pain again of 5/10.  Dr. Brothers called to come for another rebolus.   1115:  Dr. Brothers here.  2nd rebolus in epidural given to patient.    1120:  Patient all of a sudden feeling sharp cramping pain in between her scapula in her upper back that radiates up her neck.  Attempted to reposition patient and pain intensified.  Patient states the pain is 5/10, but if she is not moving pain is a 2/10.  Gave patient a heat pack.  Dr. Brothers paged to notify of patients discomfort.  1148:  Dr. Brothers here to see patient. Dr. Brothers recommends patient to change positions.  Patient changed positions and started feeling a little better.    1520:  FHTs showing early decels.  SVE done.  SVE 9/100/-1.  Will continue to monitor.   1535:  Dr. Colbert updated on patient status.  No new orders received.  1741:  Patient starting to feel rectal pressure with contractions.  SVE done.  SVE 10/100/+1.  Patients temp at this time is 100.7 orally.  Will notify Dr. Colbert immediatly.   1745:  FHTs starting to increase.  Dr. Colbert notified of patients elevated temperature, FHTs increasing to above 160s, and SVE.  Orders received to give patient 1000mg of Tylenol and to have patient labor down for an hour.  Will plan to recheck temperature at 1830 and notify Dr. Colbert if temperature is greater than 100.4 orally.     1754:  1000mg of Tylenol given to patient.  Patient agrees with plan of care.  Educated patient on chorioamnionitis.  All questions answered.    1840:  Patients temp 100.3 orally.  Dr. Colbert paged to notify of patients temp and elevated FHTs. Orders received to start ampicillin and gentamicin for chorioamnionitis.  Orders also to collect a CRP and CBC with platelets diff.  Orders to start pushing with patient and if pushing not effective may labor down a little longer.    1910:  Report given to Albina Romeo RN.     Ayleen Maradiaga RN

## 2017-09-21 NOTE — ANESTHESIA PREPROCEDURE EVALUATION
PAC NOTE:       ANESTHESIA PRE EVALUATION:  Anesthesia Evaluation       history and physical reviewed .             ROS/MED HX    ENT/Pulmonary:     (+)asthma , . .    Neurologic:       Cardiovascular:  - neg cardiovascular ROS       METS/Exercise Tolerance:     Hematologic:         Musculoskeletal:         GI/Hepatic:         Renal/Genitourinary:         Endo:         Psychiatric:         Infectious Disease:         Malignancy:         Other:                     Physical Exam      Airway   Mallampati: II  TM distance: > 3 FB  Neck ROM: full    Dental     Cardiovascular       Pulmonary              Anesthesia Plan      History & Physical Review      ASA Status:  .  OB Epidural Asa: 2            Postoperative Care      Consents                            .

## 2017-09-21 NOTE — H&P
"H and P:  SUBJECTIVE:  Nini Wang is a 27 year old G1 at 40+6 weeks who was admitted last night for cervidil due to oligohydramnios.  Her NEIL yesterday on u/s was 5.7.   The EFW was 4100g.   She is feeling well and feeling fetal movement.   Overnight she began to have cramping and the cervidil came out.   She began to dilate and pit was started.   She received her epidural for pain control at 6 am.   She is doing well with some nausea.   She is feeling some pressure.      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     Low iron stores     has taken iron in      Uncomplicated asthma     exercise induced asthma       Past Surgical History:   Procedure Laterality Date     NO HISTORY OF SURGERY         MEDICATIONS:  Current Facility-Administered Medications   Medication     lactated ringers infusion     lactated ringers BOLUS 500 mL     lactated ringers BOLUS 1,000 mL    Or     lactated ringers BOLUS 500 mL     acetaminophen (TYLENOL) tablet 650 mg     naloxone (NARCAN) injection 0.1-0.4 mg     ondansetron (ZOFRAN) injection 4 mg     oxytocin (PITOCIN) injection 10 Units     oxytocin (PITOCIN) 30 units in 500 mL 0.9% NaCl infusion     Medication Instructions: misoprostol (CYTOTEC)- Nurse to discuss ordering with provider, if needed. Ordered via \"OB misoprostol (CYTOTEC) Postpartum Hemorrhage PANEL\"     methylergonovine (METHERGINE) injection 200 mcg     carboprost (HEMABATE) injection 250 mcg     lidocaine 1 % 0.1-20 mL     ibuprofen (ADVIL/MOTRIN) tablet 800 mg     oxyCODONE-acetaminophen (PERCOCET) 5-325 MG per tablet 1 tablet     lidocaine 1 % 1 mL     lidocaine (LMX4) kit     sodium chloride (PF) 0.9% PF flush 3 " "mL     sodium chloride (PF) 0.9% PF flush 3 mL     nitrous oxide/oxygen 50/50 blend     fentaNYL (PF) (SUBLIMAZE) injection  mcg     lidocaine 1 % 1 mL     lidocaine (LMX4) kit     sodium chloride (PF) 0.9% PF flush 3 mL     sodium chloride (PF) 0.9% PF flush 3 mL     oxytocin (PITOCIN) 30 units in 500 mL 0.9% NaCl infusion     medication instruction     lactated ringers BOLUS 250 mL     ePHEDrine injection 5 mg     nalbuphine (NUBAIN) injection 2.5-5 mg     naloxone (NARCAN) injection 0.1-0.4 mg     medication instruction     Opioid plan postpartum - medication instruction     BUPivacaine (MARCAINE) 0.125% in NaCl 0.9% 250 mL EPIDURAL infusion     influenza quadrivalent (PF) vacc age 3 yrs and older (FLUZONE or Flulaval) injection 0.5 mL     diphenhydrAMINE (BENADRYL) capsule 25-50 mg       SOCIAL HISTORY:  Social History   Substance Use Topics     Smoking status: Never Smoker     Smokeless tobacco: Never Used     Alcohol use No      Comment: not while pregnant       Family History   Problem Relation Age of Onset     Thyroid Disease Mother      GASTROINTESTINAL DISEASE Father      small bowel obstructions     Dementia Maternal Grandmother      vascular     Lung Cancer Paternal Grandmother      Pancreatic Cancer Paternal Grandfather 78       Objective:  Blood pressure 109/55, pulse 65, temperature 99.1  F (37.3  C), temperature source Oral, resp. rate 16, height 1.626 m (5' 4\"), weight 79.4 kg (175 lb), last menstrual period 12/09/2016, not currently breastfeeding.  Ext: no edema  Lie: cephalic  Cx: 5-6/90%/-1  AROM: clear  EFM: 140-150 and good variability    Assessment:  1. Prim at term  2. Oligohydramios  3. Possible large baby  4. GBS NEG  5. Immune to rubella and A+    Plan:  1. Patient with epidural for pain control  2. Continue pit  3. Expectant management        "

## 2017-09-21 NOTE — PROVIDER NOTIFICATION
09/21/17 0300   Provider Notification   Provider Name/Title Dr. Cedeno   Method of Notification Phone   Request Evaluate - Remote   Notification Reason Status Update   Dr. Cedeno updated that pt's cervidil fell out after pt BM.  SVE performed, 2.5/60/-2 with stanley score of 7.  Discussed questionable SROM at 0030 with negative ferning.  Discussed with MD that this RN believes pt is ruptured, clear fluid noted to chux and glove with exam.  Dr. Cedeno verbalized understanding.  TORB for amnisure 6 hours after cervidil removal per policy.  TORB for intrapartum orders and oxytocin augmentation starting at 0500 if no/limited cervical change at that time.  Will update pt on POC and continue to monitor.

## 2017-09-21 NOTE — PROVIDER NOTIFICATION
09/20/17 2053   Provider Notification   Provider Name/Title Dr. Cedeno   Method of Notification Phone   Dr. Cedeno notified of pt arrival, SVE, UC's, stanley score of 3, FHR Cat. I.  Dr. Cedeno verbalized understanding.  TORB for cervical ripening with 10mg cervidil vaginally, and 25-50mg PO benadryl at HS PRN for sleep.  Will update pt on POC and continue to monitor.

## 2017-09-21 NOTE — ADDENDUM NOTE
Addendum  created 09/21/17 1120 by Yung Brothers MD    Anesthesia Event edited, Anesthesia Intra Meds edited, Procedure Event Log accessed

## 2017-09-21 NOTE — PLAN OF CARE
Data: [facility] Patient here for cervical ripening for oligohydramnios. Patient is a .  Prenatal record reviewed. Pregnancy has been uncomplicated thus far.  Gestational Age 40.5. VSS. Fetal movement present. Patient denies contractions, backache, abnormal vaginal discharge, pelvic pressure, UTI symptoms, GI problems, bloody show, vaginal bleeding, edema, headache, visual disturbances, epigastric or URQ pain, abdominal pain, rupture of membranes. Support person, Franklyn, is present.   Action: Verbal consent for EFM. Baseline assessment completed. Bill of rights reviewed.    Response: Patient verbalized agreement with plan. Will contact Dr Cedeno.

## 2017-09-21 NOTE — PROVIDER NOTIFICATION
09/21/17 0630   Provider Notification   Provider Name/Title Dr. Cedeno   Method of Notification Phone   Request Evaluate - Remote   Notification Reason Status Update;SVE   Dr. Cedeno updated on SVE of 3/70/-2 at 0500, oxytocin started shortly after per previous order.  Discussed current UC's q2-4 min, pt increasingly uncomfortable with UC's.  Discussed pt currently using nitrous oxide for pain, considering epidural placement.  Dr. Cedeno verbalized understanding, per MD okay to have epidural at any time.  MD requested that this RN ask day shift to run Amnisure to verify rupture when able to 6 hours post cervidil.  Will update pt on POC and continue to monitor.

## 2017-09-21 NOTE — ANESTHESIA PROCEDURE NOTES
Peripheral nerve/Neuraxial procedure note : epidural catheter  Pre-Procedure  Performed by YUNG BROTHERS  Referred by ELIZABETH MOJICA  Location: OB      Pre-Anesthestic Checklist: patient identified, IV checked, risks and benefits discussed, informed consent, monitors and equipment checked, pre-op evaluation and at physician/surgeon's request    Timeout  Correct Patient: Yes   Correct Procedure: Yes   Correct Site: Yes   Correct Laterality: N/A   Correct Position: Yes   Site Marked: N/A   .   Procedure Documentation    .    Procedure:    Epidural catheter.     .  .       Assessment/Narrative  .  .  . Comments:  Pre-Procedure  Performed by Yung Brothers MD  Location: OB.      PreAnesthestic Checklist: patient identified, IV checked, risks and benefits discussed, informed consent obtained, monitors and equipment checked, pre-op evaluation and at physician/surgeon's request.    Timeout   Correct Patient: Yes  Correct Procedure: Epidural catheter placement  Correct Site: Yes   Correct Position: Yes    Procedure Documentation  Procedure:   Epidural catheter block for Labor    Patient currently in labor and she and OBMD request a labor epidural to control her labor pains. Patient was interviewed and examined. Procedure and risks including but not limited to bleeding, infection, nerve injury, paralysis, PDPH, and inadequate block requiring intervention discussed with patient. Questions answered. This epidural is to be placed in anticipation of vaginal delivery.  She consents to the epidural procedure.  Time-out was performed.  I or my partners remain immediately available for management of any issues or complications and will monitor at appropriate intervals.  Procedure: Patient sitting. Betadine prep x 3. Sterile drape applied.  Mask and gloves used.  Lidocaine 1%  local infiltration at L 3-4.  17 G. Tuohy needle at L3-4 by loss of resistance into epidural space.  No CSF, paresthesia or blood. 1.5 % Lidocaine with 1:200,000  Epinephrine 5cc test dose. Epidural catheter inserted w/o resistance to 5 cm in epidural space. Then 0.125% bupivicaine 10 cc with NS 5 cc.  Aspiration negative for blood and CSF.   Negative for neuro change, paresthesia or symptoms of intravascular injection or intrathecal injection.  Infusion orders written and infusion of 0.125% bupivicaine 15cc per hour started.    Yung Brothers MD

## 2017-09-22 LAB — HGB BLD-MCNC: 9.6 G/DL (ref 11.7–15.7)

## 2017-09-22 PROCEDURE — 85018 HEMOGLOBIN: CPT | Performed by: FAMILY MEDICINE

## 2017-09-22 PROCEDURE — 12000010 ZZH R&B MS INTERMEDIATE OVERFLOW

## 2017-09-22 PROCEDURE — 25000128 H RX IP 250 OP 636: Performed by: FAMILY MEDICINE

## 2017-09-22 PROCEDURE — 36415 COLL VENOUS BLD VENIPUNCTURE: CPT | Performed by: FAMILY MEDICINE

## 2017-09-22 PROCEDURE — 25000132 ZZH RX MED GY IP 250 OP 250 PS 637: Performed by: FAMILY MEDICINE

## 2017-09-22 RX ADMIN — FERROUS SULFATE TAB 325 MG (65 MG ELEMENTAL FE) 325 MG: 325 (65 FE) TAB at 20:48

## 2017-09-22 RX ADMIN — AMPICILLIN SODIUM 2 G: 2 INJECTION, POWDER, FOR SOLUTION INTRAMUSCULAR; INTRAVENOUS at 01:25

## 2017-09-22 RX ADMIN — ACETAMINOPHEN 650 MG: 325 TABLET, FILM COATED ORAL at 22:32

## 2017-09-22 RX ADMIN — AMPICILLIN SODIUM 2 G: 2 INJECTION, POWDER, FOR SOLUTION INTRAMUSCULAR; INTRAVENOUS at 08:00

## 2017-09-22 RX ADMIN — SENNOSIDES AND DOCUSATE SODIUM 2 TABLET: 8.6; 5 TABLET ORAL at 09:20

## 2017-09-22 RX ADMIN — GENTAMICIN SULFATE 120 MG: 40 INJECTION, SOLUTION INTRAMUSCULAR; INTRAVENOUS at 03:28

## 2017-09-22 RX ADMIN — PRENATAL VIT W/ FE FUMARATE-FA TAB 27-0.8 MG 1 TABLET: 27-0.8 TAB at 09:20

## 2017-09-22 RX ADMIN — GENTAMICIN SULFATE 120 MG: 40 INJECTION, SOLUTION INTRAMUSCULAR; INTRAVENOUS at 11:40

## 2017-09-22 RX ADMIN — IBUPROFEN 800 MG: 400 TABLET ORAL at 11:40

## 2017-09-22 RX ADMIN — AMPICILLIN SODIUM 2 G: 2 INJECTION, POWDER, FOR SOLUTION INTRAMUSCULAR; INTRAVENOUS at 13:14

## 2017-09-22 RX ADMIN — SENNOSIDES AND DOCUSATE SODIUM 1 TABLET: 8.6; 5 TABLET ORAL at 20:48

## 2017-09-22 RX ADMIN — IBUPROFEN 800 MG: 400 TABLET ORAL at 17:51

## 2017-09-22 RX ADMIN — IBUPROFEN 800 MG: 400 TABLET ORAL at 06:14

## 2017-09-22 RX ADMIN — GENTAMICIN SULFATE 120 MG: 40 INJECTION, SOLUTION INTRAMUSCULAR; INTRAVENOUS at 20:03

## 2017-09-22 RX ADMIN — FERROUS SULFATE TAB 325 MG (65 MG ELEMENTAL FE) 325 MG: 325 (65 FE) TAB at 09:26

## 2017-09-22 RX ADMIN — ACETAMINOPHEN 650 MG: 325 TABLET, FILM COATED ORAL at 10:06

## 2017-09-22 RX ADMIN — AMPICILLIN SODIUM 2 G: 2 INJECTION, POWDER, FOR SOLUTION INTRAMUSCULAR; INTRAVENOUS at 19:13

## 2017-09-22 ASSESSMENT — ACTIVITIES OF DAILY LIVING (ADL)
RETIRED_COMMUNICATION: 0-->UNDERSTANDS/COMMUNICATES WITHOUT DIFFICULTY
AMBULATION: 0-->INDEPENDENT
TOILETING: 0-->INDEPENDENT
RETIRED_EATING: 0-->INDEPENDENT
BATHING: 0-->INDEPENDENT
COGNITION: 0 - NO COGNITION ISSUES REPORTED
SWALLOWING: 0-->SWALLOWS FOODS/LIQUIDS WITHOUT DIFFICULTY
TRANSFERRING: 0-->INDEPENDENT
DRESS: 0-->INDEPENDENT
FALL_HISTORY_WITHIN_LAST_SIX_MONTHS: NO

## 2017-09-22 NOTE — PROVIDER NOTIFICATION
09/21/17 1924   Provider Notification   Provider Name/Title Dr Tafoya   Method of Notification Phone   Notification Reason Other (Comment)   Order received to start new epidural cartridge due to old one being almost empty, continue to run at 15mL/hr.

## 2017-09-22 NOTE — PLAN OF CARE
Data: Nini Wang transferred to 243 via wheelchair at 0115. Baby transferred via crib to Room 243 with NICU nurse. Baby was in NICU nurse after delivery until 0030. Baby came up to see mom to try to breastfeed, but baby was too sleepy.   Action: Receiving unit notified of transfer: Yes. Patient and family notified of room change. Report given to Joan SOLOMON RN at 0100. Belongings sent to receiving unit. Accompanied by Registered Nurse. Oriented patient to surroundings. Call light within reach. ID bands double-checked with receiving RN.  Response: Patient tolerated transfer and is stable.

## 2017-09-22 NOTE — PROGRESS NOTES
"SUBJECTIVE:  Nini Wang is a 27 year old woman who is on postpartum day # 1.  She states that her bleeding has diminished.  She is having some cramping pain and that pain is under control with the available medication.  She has no concerns at this time.      Objective:   Blood pressure 103/49, pulse 77, temperature 98  F (36.7  C), temperature source Oral, resp. rate 16, height 1.626 m (5' 4\"), weight 79.4 kg (175 lb), last menstrual period 12/09/2016, SpO2 100 %, unknown if currently breastfeeding.  Fundus is firm and 1 FB below the umbilicus  Ext: no edema  Perineum: no concerns    HGB:   Lab Results   Component Value Date    WBC 15.9 (H) 09/21/2017    HGB 9.6 (L) 09/22/2017    HCT 38.3 09/21/2017     09/21/2017     02/27/2017    BUN 5 (L) 02/27/2017    CO2 21 02/27/2017       Assessment:  1. Postpartum day # 1  2. S/p chorio - on amp and gent and no fever since 7 pm last night     Plan:  1. Will tx with 24 abx  2. Stop amp and gent tonight if she remains afebrile  3. Expect d/c tomorrow - baby will get 48 hours abx so will likely be able to go at 10 pm tomorrow night        "

## 2017-09-22 NOTE — PLAN OF CARE
Problem: Patient Care Overview  Goal: Plan of Care/Patient Progress Review  Outcome: Improving  Afebrile.  VSS.  Tolerating regular diet.  Nipples flat.  Breastfeeding with shield and effective.  Lactation here to provide support.  Encourage mom to start pumping, although pumping with every feedings is not necessary.  Fundus firm at umbilicus.  Small amount vaginal bleeding.  No clots.  Perineal laceration healing well.  Hemorrhoids noted.  Tucks given and ice applied.  Ibuprofen and acetaminophen given for pain and effective.  Up independently.  IV antibiotics administered.

## 2017-09-22 NOTE — PHARMACY-ADMISSION MEDICATION HISTORY
Admission medication history interview status for this patient is complete. See [facility] admission navigator for allergy information, prior to admission medications and immunization status.     Medication history interview source(s):Patient  Medication history resources (including written lists, pill bottles, clinic record):None  Primary pharmacy[facility]    Changes made to PTA medication list:  Added: none  Deleted: none  Changed: none    Actions taken by pharmacist (provider contacted, etc):None     Additional medication history information:None    Medication reconciliation/reorder completed by provider prior to medication history? No    Do you take OTC medications (eg tylenol, ibuprofen, fish oil, eye/ear drops, etc)? See list    For patients on insulin therapy: No    Prior to Admission medications    Medication Sig Last Dose Taking? Auth Provider   ferrous sulfate (IRON) 325 (65 FE) MG tablet Take 1 tablet (325 mg) by mouth 2 times daily 9/19/2017 at Unknown time Yes Judit Colbert MD   Prenatal Vit-Fe Fumarate-FA (PRENATAL MULTIVITAMIN  PLUS IRON) 27-0.8 MG TABS per tablet Take 1 tablet by mouth daily 9/19/2017 at Unknown time Yes Judit Colbert MD

## 2017-09-22 NOTE — PLAN OF CARE
Problem: Patient Care Overview  Goal: Plan of Care/Patient Progress Review  Outcome: Improving  Patient attempting to sleep between cares and breast feeding. Up and voided x 1 this shift. Using warm pack for neck pain. Breast feeding with a shield. Monitor.

## 2017-09-22 NOTE — LACTATION NOTE
This note was copied from a baby's chart.  Assisting/observing with breast feeding at 1430 fdg. Oz is rooting. Nipple is being used. Latches eagerly in cradle hold. Some positioning adjustments. Recommended massage and compressions with feeding. I also recommend Nini to do pumping to assist in getting milk in as she is using a nipple shield. Anticipating discharge over the weekend, I asked staff to give my card for follow up with me in [facility]. Bedside TAVIA Mack present for teaching to family.

## 2017-09-22 NOTE — L&D DELIVERY NOTE
Delivery Summary    Nini Wang MRN# [id number]   Age: 27 year old YOB: 1990     ASSESSMENT & PLAN: Nini Wang delivered 9 lb baby boy with mec which was noted at 7:55 pm.   Had been tx for chorioamnionitis with amp and gent times one for temp times 2 100.4 and then 100.3 1 hour after 1000 mg tylenol.   Baby had increase in baseline heartrate during this time to 160-170 from 140-150's.  Baby delivered FRANC and NICU present.           Labor Event Times    Labor onset date:  17 Onset time:   5:05 AM   Dilation complete date:  17 Complete time:   5:41 PM   Start pushing date/time:  2017 193            Labor Events     labor?:  No   Labor Type:  Induction   Predominate monitoring during 1st stage:  continuous electronic fetal monitoring      Antibiotics received during labor?:  No      Rupture date/time:     Rupture type:  Artificial Rupture of Membranes   Fluid color:  Meconium      Induction:  Cervidil, Oxytocin   Induction date/time:     Cervical ripening date/time:        1:1 continuous labor support provided by?:  RN          Delivery/Placenta Date and Time    Delivery Date:  17 Delivery Time:  10:11 PM   Placenta Date/Time:  2017 10:18 PM   Oxytocin given at the time of delivery:  after delivery of placenta      Vaginal Counts    Initial count performed by 2 team members:   Two Team Members   Albina Meek Suture Lindsay Sponges Instruments   Initial counts 2  5    Added to count  1     Final counts 2 1 5       Placed during labor Accounted for at the end of labor   NA NA   NA NA   NA NA      Final count performed by 2 team members:   Two Team Members   Dr Filemon Figueroa RN         Final count correct?:  Yes         Apgars    Living status:  Living    1 Minute 5 Minute 10 Minute 15 Minute 20 Minute   Skin color: 0  1       Heart rate: 1  2       Reflex irritability: 1  2       Muscle tone: 1  2       Respiratory  effort: 1  2       Total: 4  9          Apgars assigned by:  5 MINUTE BY MELIA MATA CNP      Cord    Vessels:  3 Vessels Complications:  Nuchal   Cord Blood Disposition:  Lab Gases Sent?:  No         [place] Resuscitation    Methods:  Suctioning      [place] Care at Delivery:  [facility] Delivery Note    Asked by Dr. Colbert to attend the delivery of this term, male infant with a gestational age of 40 6/7 weeks secondary to meconium stained fluid and chorioamnionitis.      Infant delivered at 2211 hours on 2017. Infant had spontaneous respirations at birth. He was placed on a warmer, dried, stimulated, and bulb and catheter suctioned at birth. Apgars were 4 at one minute and 9 at five minutes of age. Gross PE is WNL except for molding of the head. Infant was shown to mother and father and will be transferred to the the pediatric floor with mother for further care, to be followed by Pediatric hospitalist.    [place] Assessment Tool Data    Gestational Age: 40 6/7    Maternal temperature range:99.4-100.7    Membranes ruptured for: 22 hours     GBS status:negative    Maternal Antibiotic Status: ampicillin and gentamicin 3 hours prior to delivery    Additional Management; None    Fetal Status Prior to  Delivery: well, no issues with the exception of meconium stained fluid  Fetal Status Comments: Infant to NICU for labs, IV start and 2 hours of vital signs monitoring.  If stable to Pediatric floor with mother to be followed by Pediatric hospitalist.     Determination based on clinical exam after birth:  Based on the examination this is a Well Appearing infant.    Disposition:  To Pediatric floor with mom after 2 hours observation in the NICU    EOS score: 0.23      Melia Rawls RN, [facility]  2017  10:41 PM       Measurements    Weight:  8 lb 15.6 oz       Skin to Skin and Feeding Plan    Skin to skin initiation date/time: 17   Skin to skin with:  Mother   Skin to skin end date/time:        Labor  Events and Shoulder Dystocia    Fetal Tracing Prior to Delivery:  Category 2   Shoulder dystocia present?:  Neg            Delivery (Maternal) (Provider to Complete) (431167)    Episiotomy:  None   Perineal lacerations:  2nd Repaired?:  Yes   Periurethral laceration:  left Repaired?:  No   Vaginal laceration?:  No    Cervical laceration?:  No    Est. blood loss (mL):  440         Mother's Information  Mother: Nini Wang #[id number]    Start of Mother's Information     IO Blood Loss  09/21/17 0505 - 09/21/17 2304    Mom's I/O Activity            End of Mother's Information  Mother: Nini Wang #[id number]            Delivery - Provider to Complete ([id number])    Delivering clinician:  MARK COLBERT   Attempted Delivery Types (Choose all that apply):  Spontaneous Vaginal Delivery   Delivery Type (Choose the 1 that will go to the Birth History):  Vaginal, Spontaneous Delivery                           Placenta    Delayed Cord Clamping:  Done   Date/Time:  9/21/2017 10:18 PM   Removal:  Spontaneous   Disposition:  Pathology      Anesthesia    Method:  Epidural   Cervical dilation at placement:  0-3         Presentation and Position    Presentation:  Vertex   Position:  Left Occiput Anterior                    Mark Colbert MD

## 2017-09-22 NOTE — PLAN OF CARE
Patient unable to void. Straight cathed patient for 400 ml sandy urine. Will relay to postpartum RN upon transfer to Room 243.

## 2017-09-23 VITALS
HEART RATE: 70 BPM | HEIGHT: 64 IN | SYSTOLIC BLOOD PRESSURE: 110 MMHG | WEIGHT: 175 LBS | TEMPERATURE: 98.3 F | DIASTOLIC BLOOD PRESSURE: 70 MMHG | OXYGEN SATURATION: 97 % | RESPIRATION RATE: 16 BRPM | BODY MASS INDEX: 29.88 KG/M2

## 2017-09-23 PROCEDURE — 25000128 H RX IP 250 OP 636: Performed by: FAMILY MEDICINE

## 2017-09-23 PROCEDURE — 25000132 ZZH RX MED GY IP 250 OP 250 PS 637: Performed by: FAMILY MEDICINE

## 2017-09-23 PROCEDURE — 90686 IIV4 VACC NO PRSV 0.5 ML IM: CPT | Performed by: FAMILY MEDICINE

## 2017-09-23 RX ORDER — OMEGA-3 FATTY ACIDS/FISH OIL 300-1000MG
600 CAPSULE ORAL EVERY 6 HOURS PRN
Qty: 60 CAPSULE | COMMUNITY
Start: 2017-09-23 | End: 2017-12-18

## 2017-09-23 RX ORDER — ASPIRIN 81 MG
100 TABLET, DELAYED RELEASE (ENTERIC COATED) ORAL DAILY
Qty: 30 TABLET | Refills: 1 | Status: SHIPPED | OUTPATIENT
Start: 2017-09-23 | End: 2017-12-18

## 2017-09-23 RX ADMIN — ACETAMINOPHEN 650 MG: 325 TABLET, FILM COATED ORAL at 15:35

## 2017-09-23 RX ADMIN — PRENATAL VIT W/ FE FUMARATE-FA TAB 27-0.8 MG 1 TABLET: 27-0.8 TAB at 09:32

## 2017-09-23 RX ADMIN — ACETAMINOPHEN 650 MG: 325 TABLET, FILM COATED ORAL at 09:32

## 2017-09-23 RX ADMIN — SENNOSIDES AND DOCUSATE SODIUM 2 TABLET: 8.6; 5 TABLET ORAL at 09:32

## 2017-09-23 RX ADMIN — FERROUS SULFATE TAB 325 MG (65 MG ELEMENTAL FE) 325 MG: 325 (65 FE) TAB at 09:32

## 2017-09-23 RX ADMIN — IBUPROFEN 800 MG: 400 TABLET ORAL at 13:18

## 2017-09-23 RX ADMIN — IBUPROFEN 800 MG: 400 TABLET ORAL at 07:04

## 2017-09-23 RX ADMIN — INFLUENZA A VIRUS A/MICHIGAN/45/2015 X-275 (H1N1) ANTIGEN (FORMALDEHYDE INACTIVATED), INFLUENZA A VIRUS A/HONG KONG/4801/2014 X-263B (H3N2) ANTIGEN (FORMALDEHYDE INACTIVATED), INFLUENZA B VIRUS B/PHUKET/3073/2013 ANTIGEN (FORMALDEHYDE INACTIVATED), AND INFLUENZA B VIRUS B/BRISBANE/60/2008 ANTIGEN (FORMALDEHYDE INACTIVATED) 0.5 ML: 15; 15; 15; 15 INJECTION, SUSPENSION INTRAMUSCULAR at 17:08

## 2017-09-23 RX ADMIN — IBUPROFEN 800 MG: 400 TABLET ORAL at 18:36

## 2017-09-23 RX ADMIN — FERROUS SULFATE TAB 325 MG (65 MG ELEMENTAL FE) 325 MG: 325 (65 FE) TAB at 18:36

## 2017-09-23 NOTE — PLAN OF CARE
Problem: Patient Care Overview  Goal: Plan of Care/Patient Progress Review  Outcome: Improving  Pt afebrile, VSS, up to shower this shift and using tucks/ice to perineum, rating pain a 2 , describing cramping after feeding baby,  pain decreasing with use of ibuprofen and tylenol.  Firm fundus, small amt of vag discharge, IV patent for antibiotics. Very attentive to baby. Aware of plan of care.

## 2017-09-23 NOTE — PLAN OF CARE
Problem: Patient Care Overview  Goal: Plan of Care/Patient Progress Review  Outcome: No Change  Patient attempting to sleep between breast feeding. Denied need for pain meds this shift. Voiding on own. Continuing to use ice and Tucks for [place] area and hemorrhoids. Start pumping this shift. Teaching done on how to clean pump parts and hand out given. Monitor.

## 2017-09-23 NOTE — PLAN OF CARE
Problem: Patient Care Overview  Goal: Plan of Care/Patient Progress Review  Outcome: Improving  Afebrile.  Vaginal bleeding has decreased.  Denies clots.  Ibuprofen, tucks pads and ice providing improved comfort for perineal discomfort and hemorrhoids.  Plan to discharge this afternoon.

## 2017-09-23 NOTE — PROGRESS NOTES
Meeting all goals.  Pain well managed on oral pain medications and using ice and tucks to bottom.  Discharge education complete.  Discharged from care.  Plans to stay in the hospital to bed and board with her  which will remain hospitalized until tomorrow.

## 2017-09-23 NOTE — DISCHARGE SUMMARY
"SUBJECTIVE:  Nini Wang is a 27 year old woman who is on postpartum day # 2.  She states that her bleeding has diminished.  She is having some cramping pain and that pain is under control with the available medication.  She has no concerns at this time.      Objective:   Blood pressure 110/55, pulse 69, temperature 98.5  F (36.9  C), temperature source Oral, resp. rate 16, height 1.626 m (5' 4\"), weight 79.4 kg (175 lb), last menstrual period 12/09/2016, SpO2 97 %, unknown if currently breastfeeding.  Fundus is firm and 2 FB below the umbilicus  Ext: no edema  Perineum: no concerns    Assessment:  1. Postpartum day # 2    Plan:  1. Discharge to home this evening after 48 hours - will board with baby overnight while he finishes abx  2. Follow up in 6 weeks for postpartum exam  3. Discharge meds:     1) prenatal vitamins, one orally per day  2) ibuprofen 600mg orally every 6 hours as needed for pain  3) colace prn  4) iron            "

## 2017-09-24 LAB
BACTERIA SPEC CULT: NO GROWTH
BACTERIA SPEC CULT: NO GROWTH
SPECIMEN SOURCE: NORMAL
SPECIMEN SOURCE: NORMAL

## 2017-09-25 LAB — COPATH REPORT: NORMAL

## 2017-09-26 ENCOUNTER — TELEPHONE (OUTPATIENT)
Dept: FAMILY MEDICINE | Facility: CLINIC | Age: 27
End: 2017-09-26

## 2017-09-26 NOTE — TELEPHONE ENCOUNTER
Received a 2 page fax from patient. Disability Form. Please call patient when complete and ready to be picked up. Ruth Behrens.

## 2017-09-27 NOTE — TELEPHONE ENCOUNTER
Copied for abstraction. Called patient to inform form is completed and up front, ready for . Ruth Behrens.

## 2017-10-12 ENCOUNTER — HOSPITAL ENCOUNTER (OUTPATIENT)
Dept: OBGYN | Facility: CLINIC | Age: 27
Discharge: HOME | End: 2017-10-12
Attending: FAMILY MEDICINE | Admitting: FAMILY MEDICINE
Payer: COMMERCIAL

## 2017-10-12 PROCEDURE — 99215 OFFICE O/P EST HI 40 MIN: CPT

## 2017-10-12 NOTE — CONSULTS
"LACTATION CONSULT/PHYSICIAN REPORT  [facility]       MOTHER    Doctor: Judit Cedeno     MOTHER'S CONCERNS: Baby failure to thrive    Medical History: None    Pregnancy History/Breastfeeding History  G1 P 1 No problems during the pregnancy     Delivery History      Labor Meds/Anesthesia  Epidural    Current Medications/Herbals  Prenatal vitamins and Iron    ASSESSMENT OF MOTHER    Physical: Good    Milk Supply: Overabundant    PUMPING: Pump in Style and Other: Started pumping at the hospital and pumped 1-2 x/day and has continued at home. Mom gets about 1-2 oz total pc    BABY: Oz Age: 3 weeks Birth Date: 17 Gestational Age: 40.6    Doctor: Judit Cedeno    Complications of Birth: Other: Chorio and meconium fluid    Breastfeeding/hospital: Nipple Shield for smooth nipples, Jaundice     ASSESSMENT OF BABY    Physical: Distended abdomen ac and often passes a lot of gas in the evenings and he tummy goes down. Baby has \"stridor\" noises before and sometimes during feedings. He also has some nasal congestion. Mom reports he spits through his nose as well at times.    SUPPLEMENTATION: None so far    OUTPUT:  WNL stools 3-4 x/day and all liquid, yellow seedy    BABY'S WEIGHT HISTORY    At Delivery:  Date:  Weight: 8-15   2017   R/C Weights 8 lb 15.6 oz 8 lb 9.6 oz 8 lb 8.7 oz 8 lb 8 oz      10/4/2017 10/11/2017   R/C Weights 8 lb 12 oz 8 lb 15 oz     Age: 3 weeks Date: 10/12 Weight: 8-14.5     FEEDING ASSESSMENT   Mom reports some soreness toward the end of the feedings when his mouth gets smaller around the nipple. Also she has some itching of the nipples during the feeding. She is using lanolin pc on her nipples.    INTERVENTIONS/EVALUATION:  Breast Compression and still needs the nipple shield. He gulps a lot and seems to get a lot more than what shows up on the scale. His abdomen is very distended which seems to keep him feeling full    WEIGHT " GAIN AT BREAST: (NOTE: 30cc = 1 oz):  At current weight, baby needs approximately 23.9 oz in 24 hours or 3 oz every 3 hours (90 cc)    Number order of breastfeeding    20 cc 1st side RIGHT breast after 15 minutes 24 cc 2nd side LEFT breast after 15 minutes   12 cc 3rd side RIGHT breast after 10 minutes 10 cc 4th side LEFT breast after <5 minutes    TOTAL: 64 cc or 2.1 oz after 45 minutes    SUMMARY  Baby seems to get too full and sleepy after each side. He really gulps a lot at the beginning of each side but does not get as much as it seems on the scale. Back to each side he is really tired and does not do very well, taking a lot of stimulation to keep going. His abdomen is very distended and question if he feels full sooner than he should because of it. May need to correct the small wt gain with one or two more feedings a day and a bottle of EBM once a day to see if it is enough to gain 1 oz /day. Since he is gaining, will not add too much intervention.    RECOMMENDATIONS  1. Start on the L breast for now each feeding   2. Add breast compression after the initial let down is over so he doesn't choke.To do breast compressions throughout the feeding, squeeze while baby sucks, let up when baby pauses and repeat moving the thumb to another area. This will get more milk to baby and speed the feeding along faster.    3. Try to do both breasts twice each feeding. Move him to the next side as soon as he is not swallowing or very sleepy  4. Try to nurse 9-10 x/day to get more volume into baby since he seems to stop too soon, before the end of a full feeding.  5. Add 1 oz of breast milk by bottle once a day  6. Be sure to pump twice a day    Follow up: Patient to call lactation consultant if questions arise    Next visit/Phone call: Doctor: Next Wed for Baby with DR Cedeno      Lactation Consultant: Judit Mann RN  Date: 10/12/2017    Start: 1255    End: 1356    60 minute Lactation Consultation with > 50% of time spent on  breastfeeding counseling and coordination of care.    [facility] LACTATION OFFICE  PHONE: [phone] FAX: [phone]

## 2017-10-27 ENCOUNTER — TELEPHONE (OUTPATIENT)
Dept: FAMILY MEDICINE | Facility: CLINIC | Age: 27
End: 2017-10-27

## 2017-10-27 NOTE — TELEPHONE ENCOUNTER
[facility] Lactation consultant calls, saw pt 10/12, needs order, has IP, order placed, ANITA Garza, RN, BSN  Message handled by Nurse Triage.

## 2017-11-06 ENCOUNTER — PRENATAL OFFICE VISIT (OUTPATIENT)
Dept: FAMILY MEDICINE | Facility: CLINIC | Age: 27
End: 2017-11-06
Payer: COMMERCIAL

## 2017-11-06 VITALS
RESPIRATION RATE: 16 BRPM | WEIGHT: 157.9 LBS | OXYGEN SATURATION: 98 % | DIASTOLIC BLOOD PRESSURE: 57 MMHG | TEMPERATURE: 98.2 F | HEART RATE: 63 BPM | BODY MASS INDEX: 27.1 KG/M2 | SYSTOLIC BLOOD PRESSURE: 102 MMHG

## 2017-11-06 PROBLEM — R79.0 LOW IRON STORES: Status: RESOLVED | Noted: 2017-02-27 | Resolved: 2017-11-06

## 2017-11-06 PROBLEM — O41.1290 CHORIOAMNIONITIS, DELIVERED, CURRENT HOSPITALIZATION: Status: RESOLVED | Noted: 2017-09-21 | Resolved: 2017-11-06

## 2017-11-06 LAB — HGB BLD-MCNC: 12.1 G/DL (ref 11.7–15.7)

## 2017-11-06 PROCEDURE — 36415 COLL VENOUS BLD VENIPUNCTURE: CPT | Performed by: FAMILY MEDICINE

## 2017-11-06 PROCEDURE — 85018 HEMOGLOBIN: CPT | Performed by: FAMILY MEDICINE

## 2017-11-06 PROCEDURE — 99207 ZZC POST PARTUM EXAM: CPT | Performed by: FAMILY MEDICINE

## 2017-11-06 RX ORDER — ACETAMINOPHEN AND CODEINE PHOSPHATE 120; 12 MG/5ML; MG/5ML
1 SOLUTION ORAL DAILY
Qty: 28 TABLET | Refills: 12 | Status: SHIPPED | OUTPATIENT
Start: 2017-11-06 | End: 2017-12-18

## 2017-11-06 NOTE — PROGRESS NOTES
Nini is here for a 6-week postpartum checkup.    Delivery date was 17. She had a  of a viable boy, weight 9 pounds 0 oz., with no complications.  Since delivery, she has been breast feeding.  She has no signs of infection, bleeding or other complications.  She is not pregnant.  We discussed contraceptions and she has chosen mini pill / progesterone only pill.    She  has not had intercourse since delivery and complains of No discomfort. Patient screened for postpartum depression and complaints are NEGATIVE.     EXAM:  Blood pressure 102/57, pulse 63, temperature 98.2  F (36.8  C), temperature source Oral, resp. rate 16, weight 157 lb 14.4 oz (71.6 kg), last menstrual period 2016, SpO2 98 %, currently breastfeeding.  GENERAL healthy, alert and no distress  EYES EOMI, PERRL and funduscopic deferred  HENT: Normocephalic. TM's grossly normal, oropharynx without significant findings.  NECK: no adenopathy and thyroid normal to palpation  RESP: clear to auscultation bilaterally without wheezes or rales   BREASTS: NEGATIVE for erythema and warmth and no nodules are palpated - there is no axillary or supraclavicular lymphadenopathy  CV: Regular rate and rhythum without murmur   GI: bowel sounds normal, liver span normal to percussion, no masses palpable and soft, non-tender  GYN PELVIC: normal external genitalia, normal groin lymphatics, normal urethral meatus, normal vaginal mucosa, normal cervix and normal adnexa, no masses or tenderness  MS: No varicosities. Good peripheral pulses., Extremities normal. No deformaties, edema or skin discoloration.  SKIN: no ulcers, lesions or rash  NEURO: alert/oriented to person, location and time, brisk, symmetric reflexes at knees and biceps b/l      ASSESSMENT:   Normal postpartum exam after .    PLAN:.  1. Pap obtained  2. See EPIC orders for medications  3. Will follow up in 1 year for physical and as needed

## 2017-11-06 NOTE — MR AVS SNAPSHOT
After Visit Summary   11/6/2017    Nini Wang    MRN: [id number]           Patient Information     Date Of Birth          1990        Visit Information        Provider Department      11/6/2017 4:45 PM Judit Colbert MD [facility]        Today's Diagnoses     Routine postpartum follow-up    -  1       Follow-ups after your visit        Who to contact     If you have questions or need follow up information about today's clinic visit or your schedule please contact [facility] directly at [phone].  Normal or non-critical lab and imaging results will be communicated to you by Sysomoshart, letter or phone within 4 business days after the clinic has received the results. If you do not hear from us within 7 days, please contact the clinic through [employer] or phone. If you have a critical or abnormal lab result, we will notify you by phone as soon as possible.  Submit refill requests through Arigo or call your pharmacy and they will forward the refill request to us. Please allow 3 business days for your refill to be completed.          Additional Information About Your Visit        MyChart Information     Arigo gives you secure access to your electronic health record. If you see a primary care provider, you can also send messages to your care team and make appointments. If you have questions, please call your primary care clinic.  If you do not have a primary care provider, please call [phone] and they will assist you.        Care EveryWhere ID     This is your Care EveryWhere ID. This could be used by other organizations to access your [place] medical records  [id number]        Your Vitals Were     Pulse Temperature Respirations Last Period Pulse Oximetry Breastfeeding?    63 98.2  F (36.8  C) (Oral) 16 12/09/2016 (Exact Date) 98% Yes    BMI (Body Mass Index)                   27.1 kg/m2            Blood Pressure from Last 3 Encounters:   11/06/17  102/57   09/23/17 110/70   09/20/17 117/69    Weight from Last 3 Encounters:   11/06/17 157 lb 14.4 oz (71.6 kg)   09/20/17 175 lb (79.4 kg)   09/20/17 183 lb 14.4 oz (83.4 kg)              Today, you had the following     No orders found for display         Today's Medication Changes          These changes are accurate as of: 11/6/17  5:59 PM.  If you have any questions, ask your nurse or doctor.               Start taking these medicines.        Dose/Directions    norethindrone 0.35 MG per tablet   Commonly known as:  MICRONOR   Used for:  Routine postpartum follow-up   Started by:  Judit Colbert MD        Dose:  1 tablet   Take 1 tablet (0.35 mg) by mouth daily   Quantity:  28 tablet   Refills:  12            Where to get your medicines      These medications were sent to [facility] [address] [place] [address]  [id number] [place] [address]     Phone:  [phone]     norethindrone 0.35 MG per tablet                Primary Care Provider Office Phone # Fax #    Judit Colbert [phone] [address] [facility] [address]        Equal Access to Services     ABIODUN FERGUSON AH: Hadii nat pollack hadasho Sowiliamali, waaxda luqadaha, qaybta kaalmada adeegyada, waxay de jimenez. So [facility] [place] [phone].    ATENCIÓN: Si habla español, tiene a miller disposición servicios gratuitos de asistencia lingüística. Llame al [phone].    We comply with applicable federal civil rights laws and [place] laws. We do not discriminate on the basis of race, color, national origin, age, disability, sex, sexual orientation, or gender identity.            Thank you!     Thank you for choosing [facility]  for your care. Our goal is always to provide you with excellent care. Hearing back from our patients is one way we can continue to improve our services. Please take a few minutes to complete the written survey that you may receive in the mail  after your visit with us. Thank you!             Your Updated Medication List - Protect others around you: Learn how to safely use, store and throw away your medicines at www.disposemymeds.org.          This list is accurate as of: 17  5:59 PM.  Always use your most recent med list.                   Brand Name Dispense Instructions for use Diagnosis    docusate sodium 100 MG tablet    COLACE    30 tablet    Take 100 mg by mouth daily     (normal spontaneous vaginal delivery)       ferrous sulfate 325 (65 FE) MG tablet    IRON    60 tablet    Take 1 tablet (325 mg) by mouth 2 times daily    Iron deficiency anemia during pregnancy       ibuprofen 200 MG capsule     60 capsule    Take 600 mg by mouth every 6 hours as needed for fever     (normal spontaneous vaginal delivery)       norethindrone 0.35 MG per tablet    MICRONOR    28 tablet    Take 1 tablet (0.35 mg) by mouth daily    Routine postpartum follow-up       prenatal multivitamin plus iron 27-0.8 MG Tabs per tablet     100 tablet    Take 1 tablet by mouth daily    Amenorrhea

## 2017-12-18 ENCOUNTER — OFFICE VISIT (OUTPATIENT)
Dept: FAMILY MEDICINE | Facility: CLINIC | Age: 27
End: 2017-12-18
Payer: COMMERCIAL

## 2017-12-18 VITALS
OXYGEN SATURATION: 98 % | DIASTOLIC BLOOD PRESSURE: 62 MMHG | RESPIRATION RATE: 12 BRPM | TEMPERATURE: 97.9 F | SYSTOLIC BLOOD PRESSURE: 106 MMHG | BODY MASS INDEX: 27.12 KG/M2 | WEIGHT: 158 LBS | HEART RATE: 61 BPM

## 2017-12-18 DIAGNOSIS — Z30.430 ENCOUNTER FOR IUD INSERTION: Primary | ICD-10-CM

## 2017-12-18 PROCEDURE — 58300 INSERT INTRAUTERINE DEVICE: CPT | Performed by: FAMILY MEDICINE

## 2017-12-18 NOTE — NURSING NOTE
"Chief Complaint   Patient presents with     IUD     Mirena        Initial /62 (BP Location: Right arm, Patient Position: Chair, Cuff Size: Adult Regular)  Pulse 61  Temp 97.9  F (36.6  C) (Oral)  Resp 12  Wt 158 lb (71.7 kg)  LMP 12/09/2016 (Exact Date)  SpO2 98%  BMI 27.12 kg/m2 Estimated body mass index is 27.12 kg/(m^2) as calculated from the following:    Height as of 9/20/17: 5' 4\" (1.626 m).    Weight as of this encounter: 158 lb (71.7 kg).  Medication Reconciliation: complete   Ghulam Cadena MA      "

## 2017-12-18 NOTE — PROGRESS NOTES
Subjective:  Fish Wang is a 27 year old woman who is here for an IUD insertion.  She has been counseled on the risks and benefits as well as the efficacy of this method of birth control.  She understands and consents to placement.  She had her baby about 3 months ago.   She stopped her birth control pill yesterday.       Obstetric History       T1      L1     SAB0   TAB0   Ectopic0   Multiple0   Live Births1       # Outcome Date GA Lbr José Miguel/2nd Weight Sex Delivery Anes PTL Lv   1 Term 17 40w6d 12:36 / 04:30 8 lb 15.6 oz (4.07 kg) M Vag-Spont EPI N KYLEE      Name: ALEX,BABY1 FISH      Complications: Chorioamnionitis      Apgar1:  4                Apgar5: 9            Past Medical History:   Diagnosis Date     Anemia     taking iron supplement     Blood type A+      Depression     age [age] - now in remission     Low iron stores     has taken iron in      Uncomplicated asthma     exercise induced asthma       Past Surgical History:   Procedure Laterality Date     NO HISTORY OF SURGERY          Current Outpatient Prescriptions   Medication Sig Dispense Refill     Prenatal Vit-Fe Fumarate-FA (PRENATAL MULTIVITAMIN  PLUS IRON) 27-0.8 MG TABS per tablet Take 1 tablet by mouth daily 100 tablet 3       Social History   Substance Use Topics     Smoking status: Never Smoker     Smokeless tobacco: Never Used     Alcohol use No      Comment: not while pregnant       Objective:   Blood pressure 106/62, pulse 61, temperature 97.9  F (36.6  C), temperature source Oral, resp. rate 12, weight 158 lb (71.7 kg), last menstrual period 2016, SpO2 98 %, currently breastfeeding.  Pelvic: reveals normal sized uterus which is antiverted - there is no tenderness and no adnexal masses felt  The cervix is multiiparous  Sterile technique used and vagina and cervix cleansed with betadine  After the tenaculum was placed the uterine sound was inserted to 8 cm with no complications - a Mirena IUD was placed  and the string cut at 3cm  The patient tolerated the procedure well.    Assessment:  IUD placement    Plan:   The patient was sent home with information on the Mirena and told to check for the string after each menses  If she experiences symptoms of a vaginal infection, she should be seen  The Mirena needs to be replaced after 5 years

## 2017-12-18 NOTE — MR AVS SNAPSHOT
After Visit Summary   12/18/2017    Nini Wang    MRN: [id number]           Patient Information     Date Of Birth          1990        Visit Information        Provider Department      12/18/2017 4:15 PM Judit Colbert MD [facility]        Today's Diagnoses     Encounter for IUD insertion    -  1       Follow-ups after your visit        Who to contact     If you have questions or need follow up information about today's clinic visit or your schedule please contact [facility] directly at [phone].  Normal or non-critical lab and imaging results will be communicated to you by [employer], letter or phone within 4 business days after the clinic has received the results. If you do not hear from us within 7 days, please contact the clinic through [employer] or phone. If you have a critical or abnormal lab result, we will notify you by phone as soon as possible.  Submit refill requests through [employer] or call your pharmacy and they will forward the refill request to us. Please allow 3 business days for your refill to be completed.          Additional Information About Your Visit        MyChart Information     [employer] gives you secure access to your electronic health record. If you see a primary care provider, you can also send messages to your care team and make appointments. If you have questions, please call your primary care clinic.  If you do not have a primary care provider, please call [phone] and they will assist you.        Care EveryWhere ID     This is your Care EveryWhere ID. This could be used by other organizations to access your [place] medical records  [id number]        Your Vitals Were     Pulse Temperature Respirations Last Period Pulse Oximetry BMI (Body Mass Index)    61 97.9  F (36.6  C) (Oral) 12 12/09/2016 (Exact Date) 98% 27.12 kg/m2       Blood Pressure from Last 3 Encounters:   12/18/17 106/62   11/06/17 102/57   09/23/17 110/70     Weight from Last 3 Encounters:   12/18/17 158 lb (71.7 kg)   11/06/17 157 lb 14.4 oz (71.6 kg)   09/20/17 175 lb (79.4 kg)              We Performed the Following     INSERTION INTRAUTERINE DEVICE        Primary Care Provider Office Phone # Fax #    Judit Colbert [phone] [address] [facility] [address]        Equal Access to Services     ABIODUN FERGUSON : Hadii aad ku hadasho Soomaali, waaxda luqadaha, qaybta kaalmada adeegyada, waxay idiin hayaan adeeg kharash laolivia . So [facility] [phone].    ATENCIÓN: Si habla espnavi, tiene a miller disposición servicios gratuitos de asistencia lingüística. Llame al [phone].    We comply with applicable federal civil rights laws and [place] laws. We do not discriminate on the basis of race, color, national origin, age, disability, sex, sexual orientation, or gender identity.            Thank you!     Thank you for choosing [facility]  for your care. Our goal is always to provide you with excellent care. Hearing back from our patients is one way we can continue to improve our services. Please take a few minutes to complete the written survey that you may receive in the mail after your visit with us. Thank you!             Your Updated Medication List - Protect others around you: Learn how to safely use, store and throw away your medicines at www.disposemymeds.org.          This list is accurate as of: 12/18/17  4:31 PM.  Always use your most recent med list.                   Brand Name Dispense Instructions for use Diagnosis    prenatal multivitamin plus iron 27-0.8 MG Tabs per tablet     100 tablet    Take 1 tablet by mouth daily    Amenorrhea

## 2018-05-04 ENCOUNTER — OFFICE VISIT (OUTPATIENT)
Dept: FAMILY MEDICINE | Facility: CLINIC | Age: 28
End: 2018-05-04
Payer: COMMERCIAL

## 2018-05-04 VITALS
OXYGEN SATURATION: 98 % | TEMPERATURE: 98.7 F | BODY MASS INDEX: 27.12 KG/M2 | HEART RATE: 83 BPM | SYSTOLIC BLOOD PRESSURE: 106 MMHG | WEIGHT: 158 LBS | DIASTOLIC BLOOD PRESSURE: 72 MMHG

## 2018-05-04 DIAGNOSIS — N61.0 MASTITIS: Primary | ICD-10-CM

## 2018-05-04 PROCEDURE — 99213 OFFICE O/P EST LOW 20 MIN: CPT | Performed by: FAMILY MEDICINE

## 2018-05-04 RX ORDER — CEPHALEXIN 500 MG/1
500 CAPSULE ORAL 4 TIMES DAILY
Qty: 40 CAPSULE | Refills: 0 | Status: SHIPPED | OUTPATIENT
Start: 2018-05-04 | End: 2018-09-15

## 2018-05-04 NOTE — MR AVS SNAPSHOT
After Visit Summary   5/4/2018    Nini Wang    MRN: [id number]           Patient Information     Date Of Birth          1990        Visit Information        Provider Department      5/4/2018 3:45 PM Sandra Day MD [facility]        Today's Diagnoses     Mastitis    -  1      Care Instructions      Mastitis  Mastitis occurs when breast tissue becomes swollen and inflamed. This is almost always due to infection. Mastitis most often affects breastfeeding women during the first 6 weeks after childbirth. For this reason, it s also known as lactation mastitis. Infection may happen after a duct becomes clogged, causing milk to back up in the breast. Mastitis may also occur if bacteria enter the breast through small cracks in the nipple. (Less often, mastitis occurs in women who aren t breastfeeding. If you have mastitis that is not due to breastfeeding, your healthcare provider will give you more information as needed. Treatment may include some of the same home care measures listed below.)  Mastitis may cause flu-like symptoms such as fever, aches, and fatigue. The affected breast may feel painful, warm, tender, firm, or swollen. The skin over the breast may be red (often in a wedge-shaped pattern). You may feel a burning a sensation when breastfeeding.  In most cases, mastitis can be treated with antibiotics. This should clear the infection. If treatment is delayed, a pocket of pus (abscess) can form in the breast tissue. A procedure may then be needed to drain the pus. In severe cases of infection, other treatments may be needed.  Home care  Breastfeeding    It s very important to keep the milk flowing from the infected breast. Continue breastfeeding from both breasts as usual. This will not hurt the baby. If this is too painful, use a breast pump to remove milk from the infected side. This can be fed to your baby or discarded. Note: If you don't continue to breastfeed or  pump your breast, bacteria can grow in the milk that is left in your breast. This can make your infection worse.    Tell your healthcare provider if you have problems with breastfeeding. He or she may suggest changes to your technique, if needed. You may also be referred to a lactation nurse or consultant for support with breastfeeding.  General care    Take any medicines you re prescribed as directed. If you re taking antibiotics, be sure to complete all of the medicine even if you start to feel better. Over-the-counter pain medicines may also be recommended. Don t use breast creams or other products or medicines without talking to your healthcare provider first. Note: If you re concerned about taking medicines while breastfeeding, talk to your healthcare provider.    Rest as often as needed. Also be sure to drink plenty of fluids.    To help relieve pain and swelling, heat or ice may be used. Apply as often as directed by your provider.    Heat: Place a warm compress on the breast. Use a towel soaked in hot water, a heating pad, or a hot water bottle.    Cold: Place a cold compress on the breast. Use an ice pack or bag of ice wrapped in a thin towel. Never place a cold source directly on the skin.  Follow-up care  Follow up with your healthcare provider as advised.  When to seek medical advice  Call your healthcare provider right away if any of these occur:    Fever of 100.4 F (38 C) or higher, or as directed by your provider    Shaking chills    Symptoms worsen or do not improve within 48 to 72 hours of starting treatment    New symptoms develop  Date Last Reviewed: 7/30/2015 2000-2017 [employer]. [address] [place] [address], [place] [address]. All rights reserved. This information is not intended as a substitute for professional medical care. Always follow your healthcare professional's instructions.                Follow-ups after your visit        Who to contact     If you have questions or need  follow up information about today's clinic visit or your schedule please contact [facility] directly at [phone].  Normal or non-critical lab and imaging results will be communicated to you by [employer], letter or phone within 4 business days after the clinic has received the results. If you do not hear from us within 7 days, please contact the clinic through [employer] or phone. If you have a critical or abnormal lab result, we will notify you by phone as soon as possible.  Submit refill requests through [employer] or call your pharmacy and they will forward the refill request to us. Please allow 3 business days for your refill to be completed.          Additional Information About Your Visit        [employer] Information     [employer] gives you secure access to your electronic health record. If you see a primary care provider, you can also send messages to your care team and make appointments. If you have questions, please call your primary care clinic.  If you do not have a primary care provider, please call [phone] and they will assist you.        Care EveryWhere ID     This is your Care EveryWhere ID. This could be used by other organizations to access your [place] medical records  [id number]        Your Vitals Were     Pulse Temperature Pulse Oximetry BMI (Body Mass Index)          83 98.7  F (37.1  C) (Oral) 98% 27.12 kg/m2         Blood Pressure from Last 3 Encounters:   05/04/18 106/72   12/18/17 106/62   11/06/17 102/57    Weight from Last 3 Encounters:   05/04/18 158 lb (71.7 kg)   12/18/17 158 lb (71.7 kg)   11/06/17 157 lb 14.4 oz (71.6 kg)              Today, you had the following     No orders found for display         Today's Medication Changes          These changes are accurate as of 5/4/18  3:55 PM.  If you have any questions, ask your nurse or doctor.               Start taking these medicines.        Dose/Directions    cephALEXin 500 MG capsule   Commonly known as:  KEFLEX   Used  for:  Mastitis        Dose:  500 mg   Take 1 capsule (500 mg) by mouth 4 times daily   Quantity:  40 capsule   Refills:  0            Where to get your medicines      These medications were sent to [facility] [address]  [address] [facility] [address]     Phone:  [phone]     cephALEXin 500 MG capsule                Primary Care Provider Office Phone # Fax #    Judit DION Colbert [phone] [address] [facility] [address]        Equal Access to Services     [facility]: Hadii aad ku hadasho Soomaali, waaxda luqadaha, qaybta kaalmada adeegyada, waxdiann hodge . So [facility] [phone].    ATENCIÓN: Si habla español, tiene a miller disposición servicios gratuitos de asistencia lingüística. [facility] [phone].    We comply with applicable federal civil rights laws and [place] laws. We do not discriminate on the basis of race, color, national origin, age, disability, sex, sexual orientation, or gender identity.            Thank you!     Thank you for choosing [facility]  for your care. Our goal is always to provide you with excellent care. Hearing back from our patients is one way we can continue to improve our services. Please take a few minutes to complete the written survey that you may receive in the mail after your visit with us. Thank you!             Your Updated Medication List - Protect others around you: Learn how to safely use, store and throw away your medicines at www.disposemymeds.org.          This list is accurate as of 5/4/18  3:55 PM.  Always use your most recent med list.                   Brand Name Dispense Instructions for use Diagnosis    cephALEXin 500 MG capsule    KEFLEX    40 capsule    Take 1 capsule (500 mg) by mouth 4 times daily    Mastitis       prenatal multivitamin plus iron 27-0.8 MG Tabs per tablet     100 tablet    Take 1 tablet by mouth daily    Amenorrhea

## 2018-05-04 NOTE — PROGRESS NOTES
SUBJECTIVE:   Chief Complaint   Patient presents with     Breast Problem     Possible mastitis started this morning, woke up this morning feeling nauseated, low grade fever, tender     Nini Wang is a 28 year old female who presents for evaluation of and area of  tenderness, swelling and warmth of the skin that developed on the  right, lateral  Breast  She is trying to wean from breast feeding, so her breasts are engorged,   This am she had low fever and nausea, but has not seen redness of the breast.  Patient has had pain and tenderness for 8 hours.    She is travelling to [place] tomorrow and wants mastitis treatment just in case  Therapies tried: none.    Past Medical History:   Diagnosis Date     Anemia     taking iron supplement     Blood type A+      Depression     age 17-21 - now in remission     Low iron stores     has taken iron in 2014     Uncomplicated asthma     exercise induced asthma     Patient Active Problem List   Diagnosis     Contraception     Low serum sodium     Blood type A+       ALLERGIES:  Prednisone      Current Outpatient Prescriptions on File Prior to Visit:  Prenatal Vit-Fe Fumarate-FA (PRENATAL MULTIVITAMIN  PLUS IRON) 27-0.8 MG TABS per tablet Take 1 tablet by mouth daily     No current facility-administered medications on file prior to visit.     Social History   Substance Use Topics     Smoking status: Never Smoker     Smokeless tobacco: Never Used     Alcohol use No      Comment: not while pregnant       Family History   Problem Relation Age of Onset     Thyroid Disease Mother      GASTROINTESTINAL DISEASE Father      small bowel obstructions     Dementia Maternal Grandmother      vascular     Lung Cancer Paternal Grandmother      Pancreatic Cancer Paternal Grandfather 78       ROS:CONSTITUTIONAL:  Fever, no  chills,   INTEGUMENTARY/SKIN: NEGATIVE for worrisome rashes,   ENT/MOUTH: NEGATIVE for ear, mouth and throat problems  RESP:NEGATIVE for significant cough or SOB      OBJECTIVE:  /72 (BP Location: Right arm, Patient Position: Chair, Cuff Size: Adult Regular)  Pulse 83  Temp 98.7  F (37.1  C) (Oral)  Wt 158 lb (71.7 kg)  SpO2 98%  BMI 27.12 kg/m2    Skin area involved is 10 cm by 10 cm on the right,  lateral breast.   The skin appear   moderately swollen, with tenderness and warmth to the touch.  There is no erythema  The breasts are engorged  GENERAL APPEARANCE: healthy, alert and no distress  EYES: EOMI,  PERRL, conjunctiva clear  RESP: lungs clear to auscultation - no rales, rhonchi or wheezes  CV: regular rates and rhythm, normal S1 S2, no murmur noted  NEURO: Normal strength and tone, sensory exam grossly normal,  normal speech and mentation     ASSESSMENT:  Mastitis     - cephALEXin (KEFLEX) 500 MG capsule; Take 1 capsule (500 mg) by mouth 4 times daily    Symptoms are mild at present-  Discussed monitoring symptoms and start antibiotic if redness, increased tenderness, or return of fever      patient to monitor for signs or symptoms of worsening/ spreading infection.     Warm, moist packs or towels can be applied to the region to aid in resolution of the infection.  Use Tylenol or ibuprofen for pain or fever.    If local redness/ swelling noted then massage/ express  to empty the infected milk is recommended

## 2018-05-04 NOTE — PATIENT INSTRUCTIONS
Mastitis  Mastitis occurs when breast tissue becomes swollen and inflamed. This is almost always due to infection. Mastitis most often affects breastfeeding women during the first 6 weeks after childbirth. For this reason, it s also known as lactation mastitis. Infection may happen after a duct becomes clogged, causing milk to back up in the breast. Mastitis may also occur if bacteria enter the breast through small cracks in the nipple. (Less often, mastitis occurs in women who aren t breastfeeding. If you have mastitis that is not due to breastfeeding, your healthcare provider will give you more information as needed. Treatment may include some of the same home care measures listed below.)  Mastitis may cause flu-like symptoms such as fever, aches, and fatigue. The affected breast may feel painful, warm, tender, firm, or swollen. The skin over the breast may be red (often in a wedge-shaped pattern). You may feel a burning a sensation when breastfeeding.  In most cases, mastitis can be treated with antibiotics. This should clear the infection. If treatment is delayed, a pocket of pus (abscess) can form in the breast tissue. A procedure may then be needed to drain the pus. In severe cases of infection, other treatments may be needed.  Home care  Breastfeeding    It s very important to keep the milk flowing from the infected breast. Continue breastfeeding from both breasts as usual. This will not hurt the baby. If this is too painful, use a breast pump to remove milk from the infected side. This can be fed to your baby or discarded. Note: If you don't continue to breastfeed or pump your breast, bacteria can grow in the milk that is left in your breast. This can make your infection worse.    Tell your healthcare provider if you have problems with breastfeeding. He or she may suggest changes to your technique, if needed. You may also be referred to a lactation nurse or consultant for support with  breastfeeding.  General care    Take any medicines you re prescribed as directed. If you re taking antibiotics, be sure to complete all of the medicine even if you start to feel better. Over-the-counter pain medicines may also be recommended. Don t use breast creams or other products or medicines without talking to your healthcare provider first. Note: If you re concerned about taking medicines while breastfeeding, talk to your healthcare provider.    Rest as often as needed. Also be sure to drink plenty of fluids.    To help relieve pain and swelling, heat or ice may be used. Apply as often as directed by your provider.    Heat: Place a warm compress on the breast. Use a towel soaked in hot water, a heating pad, or a hot water bottle.    Cold: Place a cold compress on the breast. Use an ice pack or bag of ice wrapped in a thin towel. Never place a cold source directly on the skin.  Follow-up care  Follow up with your healthcare provider as advised.  When to seek medical advice  Call your healthcare provider right away if any of these occur:    Fever of 100.4 F (38 C) or higher, or as directed by your provider    Shaking chills    Symptoms worsen or do not improve within 48 to 72 hours of starting treatment    New symptoms develop  Date Last Reviewed: 7/30/2015 2000-2017 [employer]. [address] [place] [address], [place] [address]. All rights reserved. This information is not intended as a substitute for professional medical care. Always follow your healthcare professional's instructions.

## 2018-06-28 NOTE — MR AVS SNAPSHOT
After Visit Summary   6/21/2017    Nini Wang    MRN: [id number]           Patient Information     Date Of Birth          1990        Visit Information        Provider Department      6/21/2017 8:45 AM Judit Colbert MD [facility]        Care Instructions        The third trimester of pregnancy can be tiring and uncomfortable. Here's help relieving symptoms -- and anxiety -- as your due date approaches.    The third trimester of pregnancy can be physically and emotionally challenging. Your baby's size and position might make it hard for you to get comfortable. You might be tired of pregnancy and eager to move on to the next stage. If you've been gearing up for your due date, you might be disappointed if it comes and goes uneventfully.  Try to remain positive as you look forward to the end of your pregnancy. Soon you'll hold your baby in your arms! Here's what to expect in the meantime.  As your baby grows, his or her movements will become more obvious. These exciting sensations are often accompanied by increasing discomfort and other third trimester pregnancy symptoms.  Continued breast growth  By your due date, you might have an additional 2 pounds (nearly 1 kilogram) of breast tissue. As delivery approaches, your nipples could start leaking colostrum -- the yellowish fluid that, if you breast-feed, will nourish your baby during the first few days of life.  Weight gain  If you had a normal BMI before pregnancy, you might gain 25 to 35 pounds (about 11 to 16 kilograms) before giving birth. Your baby accounts for some of the weight gain, but so do the placenta, amniotic fluid, larger breasts and uterus, extra fat stores, and increased blood and fluid volume.  [place] contractions  These contractions are warm-ups for the real thing. They're usually weak and come and go unpredictably. True labor contractions get longer, stronger and closer together. If you're having  HPI Comments: 76 yo WF with hx htn presents with c/o head injury and neck pain. Pt reports was sleeping in her bed last night and had a dream someone was after her. She reports thinking she rolled to her right and turned her neck and fell out of bed. She hit her head on a cabinet. The fall woke her. She has some mild nausea no vomiting no blood thinners. She has not taken anything for pain. Denies weakness or numbness. Thinks she hit her left knee too but has been ambulating with no issue    Patient is a 75 y.o. female presenting with neck pain. The history is provided by the patient. Neck Pain    Associated symptoms include headaches. Pertinent negatives include no chest pain, no numbness and no weakness. History reviewed. No pertinent past medical history. History reviewed. No pertinent surgical history. History reviewed. No pertinent family history. Social History     Social History    Marital status: N/A     Spouse name: N/A    Number of children: N/A    Years of education: N/A     Occupational History    Not on file. Social History Main Topics    Smoking status: Never Smoker    Smokeless tobacco: Never Used    Alcohol use No    Drug use: No    Sexual activity: Not on file     Other Topics Concern    Not on file     Social History Narrative    No narrative on file         ALLERGIES: Review of patient's allergies indicates no known allergies. Review of Systems   Constitutional: Negative for fever. Respiratory: Negative for shortness of breath. Cardiovascular: Negative for chest pain. Musculoskeletal: Positive for neck pain. Neurological: Positive for headaches. Negative for weakness and numbness. All other systems reviewed and are negative. Vitals:    06/28/18 1033   BP: 173/84   Pulse: 89   Resp: 16   SpO2: 98%   Weight: 63.8 kg (140 lb 10.5 oz)            Physical Exam   Constitutional: She is oriented to person, place, and time.  She appears well-developed and well-nourished. No distress. HENT:   Head: Normocephalic and atraumatic. Right Ear: External ear normal.   Left Ear: External ear normal.   Eyes: EOM are normal. Pupils are equal, round, and reactive to light. Neck: Normal range of motion. Neck supple. Some midline ttp and left paracervical ttp   Cardiovascular: Normal rate, regular rhythm, normal heart sounds and intact distal pulses. Exam reveals no friction rub. No murmur heard. Pulmonary/Chest: Effort normal and breath sounds normal. No respiratory distress. She has no wheezes. She has no rales. She exhibits no tenderness. Abdominal: Soft. Bowel sounds are normal. She exhibits no distension. There is no tenderness. There is no rebound and no guarding. Musculoskeletal: Normal range of motion. She exhibits no edema or tenderness. Neurological: She is alert and oriented to person, place, and time. No cranial nerve deficit. She exhibits normal muscle tone. Coordination normal.   Skin: Skin is warm and dry. She is not diaphoretic. No pallor. Psychiatric: She has a normal mood and affect. Her behavior is normal.   Nursing note and vitals reviewed. MDM  Number of Diagnoses or Management Options  Injury of head, initial encounter:   Spondylosis of cervical region without myelopathy or radiculopathy:   Strain of neck muscle, initial encounter:   Diagnosis management comments: Head injury- eval for ich    Neck pain- eval for fx  Given age though more likely muscular       Amount and/or Complexity of Data Reviewed  Tests in the radiology section of CPT®: ordered and reviewed    Patient Progress  Patient progress: stable        ED Course       Procedures    dsicussed with pt and family results and reasons to return. She wants to take tylenol. Will rx muscle relaxer if needed discussed sedation with it and taking it with family arround. Return if worsening sx    1. Injury of head, initial encounter    2.  Spondylosis of cervical region without contractions that are painful or regular, contact your health care provider.  Backaches  As your baby continues to gain weight, pregnancy hormones relax the joints between the bones in your pelvic area. These changes can be tough on your back.  When you sit, choose chairs with good back support. Apply a heating pad or ice pack to the painful area. Ask your partner for a massage. Wear low-heeled -- but not flat -- shoes with good arch support. If the back pain doesn't go away or is accompanied by other signs and symptoms, contact your health care provider.  Shortness of breath  You might get winded easily as your uterus expands beneath your diaphragm, the muscle just below your lungs. Practice good posture to give your lungs more room to expand.  Heartburn  To keep heartburn at bay, eat small meals and drink plenty of fluids between meals. Avoid fried foods, citrus fruits or juices, and spicy foods. If these tips don't help, ask your health care provider about antacids.  Swelling  As your growing uterus puts pressure on the veins that return blood from your feet and legs, swollen feet and ankles might become an issue. Swelling in your legs, arms or hands can place pressure on nerves, causing tingling or numbness.  To reduce swelling, frequently prop up your legs and don't sit with your legs crossed. If you have to stand for long periods, try to move around often.  Spider veins, varicose veins and hemorrhoids  Increased blood circulation might cause tiny red veins, known as spider veins, to appear on your skin. You might also notice blue or reddish lines (varicose veins) beneath the surface of the skin, particularly in the legs. Varicose veins in your rectum (hemorrhoids) are another possibility.  If you have painful varicose veins, elevate your legs frequently and wear support stockings. To prevent hemorrhoids, avoid constipation. Include plenty of fiber in your diet and drink lots of fluids.  Frequent urination  As  myelopathy or radiculopathy    3.  Strain of neck muscle, initial encounter your baby moves deeper into your pelvis, you'll feel more pressure on your bladder. You might find yourself urinating more often. This extra pressure might also cause you to leak urine -- especially when you laugh, cough or sneeze. If you're worried about leaking urine, panty liners can offer a sense of security.  Continue to watch for signs of a urinary tract infection, such as urinating even more than usual, pain during urination, fever or backache. Left untreated, urinary infections increase the risk of pregnancy complications.  Vaginal discharge  Potentially heavy vaginal discharge is common at the end of pregnancy. If you saturate a panty liner within a few hours or wonder if the discharge is leaking amniotic fluid, contact your health care provider.  As anticipation grows, fears about childbirth might become more persistent. How much will it hurt? How long will it last? How will I cope?  If you haven't done so already, consider taking childbirth classes. You'll learn what to expect -- and meet other moms-to-be who share your excitement and concerns. Talk with women who've had positive birth experiences, and ask your health care provider about options for pain relief. Tell yourself that you'll simply do the best you can. There's no right or wrong way to have a baby.  The reality of parenthood might begin to sink in as well. You might feel anxious and overwhelmed, especially if this is your first baby. To stay calm, revel in the experience of being pregnant and think about the ander that will come from loving a new human being. Consider:  Writing your thoughts in a journal   Talking to your baby   Taking photos of your pregnant belly to share with your child one day  It's also helpful to plan ahead. If you'll be breast-feeding, you might get a nursing bra or a breast pump. If you're expecting a boy -- or you don't know your baby's sex -- think about what's right for your family regarding circumcision. Consider  who'll be your baby's principal health care provider. Make plans for your first few weeks together.  During the third trimester, your health care provider might ask you to come in for more frequent checkups -- perhaps every other week beginning at week 28 or 32 and every week beginning at week 36.  Like previous visits, your health care provider will check your weight and blood pressure and ask about any signs or symptoms you're experiencing. Regardless of your vaccination status, one dose of Tdap vaccine is recommended during each pregnancy -- ideally during the third trimester. This can help protect your baby from whooping cough before he or she can be vaccinated. You also might need screening tests for various conditions, including:  Gestational diabetes. This is a type of diabetes that sometimes develops during pregnancy. Prompt treatment and healthy lifestyle choices can help you manage your blood sugar level and deliver a healthy baby.   Anemia. Anemia is an abnormally low level of red blood cells or hemoglobin, a protein in red blood cells that contains iron. Severe anemia can slow your baby's growth. To treat anemia, you might need to take iron supplements.   Group B strep. Group B strep is a type of bacteria that can live in your vagina or rectum. It won't make you sick, but it could cause a serious infection for your baby after birth. If you test positive for group B strep, your health care provider will likely recommend antibiotics while you're in labor.  Your health care provider will also check your baby's size and heart rate. Near the end of your pregnancy, vaginal exams can help your health care provider determine your baby's position inside your uterus. He or she might also check your cervix to see whether it's begun to soften or dilate in preparation for birth -- although cervical exams aren't a reliable way to predict when labor will begin.  If you have specific desires or preferences for labor and  birth -- such as laboring in water or avoiding medication -- you might want to define your wishes in a birth plan. Review the plan with your health care provider ahead of time to prevent any misunderstandings.  As your due date approaches, keep asking questions. How can I tell the difference between false labor and the real thing? When do I need to go to the hospital? Could I be too late for an epidural? Remember, there's no silly question. Understanding what's happening can help you have the most positive birth experience.              Follow-ups after your visit        Follow-up notes from your care team     Return in about 2 weeks (around 7/5/2017).      Your next 10 appointments already scheduled     Jun 28, 2017  8:15 AM CDT   US OB SINGLE FOLLOW UP REPEAT with [id number]   [facility]    [address]  [address]  [facility] [address] [phone]           Please bring a list of your medicines (including vitamins, minerals and over-the-counter drugs). Also, tell your doctor about any allergies you may have. Wear comfortable clothes and leave your valuables at home.  If you re less than 20 weeks drink four 8-ounce glasses of fluid an hour before your exam. If you need to empty your bladder before your exam, try to release only a little urine. Then, drink another glass of fluid.  You may have up to two family members in the exam room. If you bring a small child, an adult must be there to care for him or her.  Please call the Imaging Department at your exam site with any questions.              Who to contact     If you have questions or need follow up information about today's clinic visit or your schedule please contact [facility] directly at [phone].  Normal or non-critical lab and imaging results will be communicated to you by MyChart, letter or phone within 4 business days after the clinic has received the results. If you do  not hear from us within 7 days, please contact the clinic through [employer] or phone. If you have a critical or abnormal lab result, we will notify you by phone as soon as possible.  Submit refill requests through [employer] or call your pharmacy and they will forward the refill request to us. Please allow 3 business days for your refill to be completed.          Additional Information About Your Visit        [employer] Information     [employer] gives you secure access to your electronic health record. If you see a primary care provider, you can also send messages to your care team and make appointments. If you have questions, please call your primary care clinic.  If you do not have a primary care provider, please call [phone] and they will assist you.        Care EveryWhere ID     This is your Care EveryWhere ID. This could be used by other organizations to access your [facility] medical records  [id number]        Your Vitals Were     Pulse Temperature Respirations Last Period Pulse Oximetry Breastfeeding?    89 98  F (36.7  C) (Oral) 16 12/09/2016 (Exact Date) 96% No    BMI (Body Mass Index)                   30.4 kg/m2            Blood Pressure from Last 3 Encounters:   06/21/17 115/68   05/24/17 124/70   04/26/17 104/62    Weight from Last 3 Encounters:   06/21/17 177 lb 1.6 oz (80.3 kg)   05/24/17 172 lb 11.2 oz (78.3 kg)   04/26/17 169 lb 4.8 oz (76.8 kg)              Today, you had the following     No orders found for display       Primary Care Provider Office Phone # Fax #    Juditluz Colbert [phone]       [facility] [id number] [place] [address]        Equal Access to Services     [facility] : Hadii aad ku hadasho Soomaali, waaxda luqadaha, qaybta kaalmada adeegyada, oniel hodge . So [facility] [phone].    ATENCIÓN: Si habla español, tiene a miller disposición servicios gratuitos de asistencia lingüística. Llame al [phone].    We comply  with applicable federal civil rights laws and [place] laws. We do not discriminate on the basis of race, color, national origin, age, disability sex, sexual orientation or gender identity.            Thank you!     Thank you for choosing [facility]  for your care. Our goal is always to provide you with excellent care. Hearing back from our patients is one way we can continue to improve our services. Please take a few minutes to complete the written survey that you may receive in the mail after your visit with us. Thank you!             Your Updated Medication List - Protect others around you: Learn how to safely use, store and throw away your medicines at www.disposemymeds.org.          This list is accurate as of: 6/21/17  9:00 AM.  Always use your most recent med list.                   Brand Name Dispense Instructions for use Diagnosis    ferrous sulfate 325 (65 FE) MG tablet    IRON    60 tablet    Take 1 tablet (325 mg) by mouth 2 times daily    Iron deficiency anemia during pregnancy       prenatal multivitamin  plus iron 27-0.8 MG Tabs per tablet     100 tablet    Take 1 tablet by mouth daily    Amenorrhea

## 2018-09-15 ENCOUNTER — OFFICE VISIT (OUTPATIENT)
Dept: URGENT CARE | Facility: URGENT CARE | Age: 28
End: 2018-09-15
Payer: COMMERCIAL

## 2018-09-15 VITALS
OXYGEN SATURATION: 98 % | SYSTOLIC BLOOD PRESSURE: 120 MMHG | BODY MASS INDEX: 28.32 KG/M2 | HEART RATE: 73 BPM | WEIGHT: 165 LBS | TEMPERATURE: 98.3 F | DIASTOLIC BLOOD PRESSURE: 60 MMHG

## 2018-09-15 DIAGNOSIS — J06.9 VIRAL URI: ICD-10-CM

## 2018-09-15 DIAGNOSIS — H69.91 DYSFUNCTION OF RIGHT EUSTACHIAN TUBE: Primary | ICD-10-CM

## 2018-09-15 PROCEDURE — 99213 OFFICE O/P EST LOW 20 MIN: CPT | Performed by: PHYSICIAN ASSISTANT

## 2018-09-15 NOTE — MR AVS SNAPSHOT
After Visit Summary   9/15/2018    Nini Wang    MRN: [id number]           Patient Information     Date Of Birth          1990        Visit Information        Provider Department      9/15/2018 7:35 PM Hong Chang PA-C [facility]        Today's Diagnoses     Viral URI    -  1    Dysfunction of right eustachian tube          Care Instructions      Viral Upper Respiratory Illness (Adult)  You have a viral upper respiratory illness (URI), which is another term for the common cold. This illness is contagious during the first few days. It is spread through the air by coughing and sneezing. It may also be spread by direct contact (touching the sick person and then touching your own eyes, nose, or mouth). Frequent handwashing will decrease risk of spread. Most viral illnesses go away within 7 to 10 days with rest and simple home remedies. Sometimes the illness may last for several weeks. Antibiotics will not kill a virus, and they are generally not prescribed for this condition.    Home care    If symptoms are severe, rest at home for the first 2 to 3 days. When you resume activity, don't let yourself get too tired.    Avoid being exposed to cigarette smoke (yours or others ).    You may use acetaminophen or ibuprofen to control pain and fever, unless another medicine was prescribed. If you have chronic liver or kidney disease, have ever had a stomach ulcer or gastrointestinal bleeding, or are taking blood-thinning medicines, talk with your healthcare provider before using these medicines. Aspirin should never be given to anyone under 18 years of age who is ill with a viral infection or fever. It may cause severe liver or brain damage.    Your appetite may be poor, so a light diet is fine. Avoid dehydration by drinking 6 to 8 glasses of fluids per day (water, soft drinks, juices, tea, or soup). Extra fluids will help loosen secretions in the nose and  lungs.    Over-the-counter cold medicines will not shorten the length of time you re sick, but they may be helpful for the following symptoms: cough, sore throat, and nasal and sinus congestion. (Note: Do not use decongestants if you have high blood pressure.)  Follow-up care  Follow up with your healthcare provider, or as advised.  When to seek medical advice  Call your healthcare provider right away if any of these occur:    Cough with lots of colored sputum (mucus)    Severe headache; face, neck, or ear pain    Difficulty swallowing due to throat pain    Fever of 100.4 F (38 C) or higher, or as directed by your healthcare provider  Call 911  Call 911 if any of these occur:    Chest pain, shortness of breath, wheezing, or difficulty breathing    Coughing up blood    Inability to swallow due to throat pain  Date Last Reviewed: 9/13/2015 2000-2017 [employer]. [address] [place] [address]. All rights reserved. This information is not intended as a substitute for professional medical care. Always follow your healthcare professional's instructions.        Earache, No Infection (Adult)  Earaches can happen without an infection. This occurs when air and fluid build up behind the eardrum causing a feeling of fullness and discomfort and reduced hearing. This is called otitis media with effusion (OME) or serous otitis media. It means there is fluid in the middle ear. It is not the same as acute otitis media, which is typically from infection.  OME can happen when you have a cold if congestion blocks the passage that drains the middle ear. This passage is called the eustachian tube. OME may also occur with nasal allergies or after a bacterial middle ear infection.    The pain or discomfort may come and go. You may hear clicking or popping sounds when you chew or swallow. You may feel that your balance is off. Or you may hear ringing in the ear.  It often takes from several weeks up to 3 months  for the fluid to clear on its own. Oral pain relievers and ear drops help if there is pain. Decongestants and antihistamines sometimes help. Antibiotics don't help since there is no infection. Your doctor may prescribe a nasal spray to help reduce swelling in the nose and eustachian tube. This can allow the ear to drain.  If your OME doesn't improve after 3 months, surgery may be used to drain the fluid and insert a small tube in the eardrum to allow continued drainage.  Because the middle ear fluid can become infected, it is important to watch for signs of an ear infection which may develop later. These signs include increased ear pain, fever, or drainage from the ear.  Home care  The following guidelines will help you care for yourself at home:    You may use over-the-counter medicine as directed to control pain, unless another medicine was prescribed. If you have chronic liver or kidney disease or ever had a stomach ulcer or GI bleeding, talk with your doctor before using these medicines. Aspirin should never be used in anyone under 18 years of age who is ill with a fever. It may cause severe liver damage.    You may use over-the-counter decongestants such as phenylephrine or pseudoephedrine. But they are not always helpful. Don't use nasal spray decongestants more than 3 days. Longer use can make congestion worse. Prescription nasal sprays from your doctor don't typically have those restrictions.    Antihistamines may help if you are also having allergy symptoms.    You may use medicines such as guaifenesin to thin mucus and promote drainage.  Follow-up care  Follow up with your healthcare provider or as advised if you are not feeling better after 3 days.  When to seek medical advice  Call your healthcare provider right away if any of the following occur:    Your ear pain gets worse or does not start to improve     Fever of 100.4 F (38 C) or higher, or as directed by your healthcare provider    Fluid or blood  draining from the ear    Headache or sinus pain    Stiff neck    Unusual drowsiness or confusion  Date Last Reviewed: 10/1/2016    5916-8766 [employer]. [address] [place] [address], [place] [address]. All rights reserved. This information is not intended as a substitute for professional medical care. Always follow your healthcare professional's instructions.                Follow-ups after your visit        Who to contact     If you have questions or need follow up information about today's clinic visit or your schedule please contact [facility] directly at [phone].  Normal or non-critical lab and imaging results will be communicated to you by [employer], letter or phone within 4 business days after the clinic has received the results. If you do not hear from us within 7 days, please contact the clinic through [employer] or phone. If you have a critical or abnormal lab result, we will notify you by phone as soon as possible.  Submit refill requests through [employer] or call your pharmacy and they will forward the refill request to us. Please allow 3 business days for your refill to be completed.          Additional Information About Your Visit        MyChart Information     [employer] gives you secure access to your electronic health record. If you see a primary care provider, you can also send messages to your care team and make appointments. If you have questions, please call your primary care clinic.  If you do not have a primary care provider, please call [phone] and they will assist you.        Care EveryWhere ID     This is your Care EveryWhere ID. This could be used by other organizations to access your [facility] medical records  [id number]        Your Vitals Were     Pulse Temperature Pulse Oximetry BMI (Body Mass Index)          73 98.3  F (36.8  C) (Oral) 98% 28.32 kg/m2         Blood Pressure from Last 3 Encounters:   09/15/18 120/60   05/04/18 106/72   12/18/17 106/62    Weight  from Last 3 Encounters:   09/15/18 165 lb (74.8 kg)   05/04/18 158 lb (71.7 kg)   12/18/17 158 lb (71.7 kg)              Today, you had the following     No orders found for display       Primary Care Provider Office Phone # Fax #    Judit Colbert [phone] [address] [facility] [address]        Equal Access to Services     RIVERA FERGUSON : Hadii aad ku hadasho Soomaali, waaxda luqadaha, qaybta kaalmada adeegyada, waxay idiin hayaan adeeg kharash laolivia . So [facility] [phone].    ATENCIÓN: Si habla español, tiene a miller disposición servicios gratuitos de asistencia lingüística. Llame al [phone].    We comply with applicable federal civil rights laws and [place] laws. We do not discriminate on the basis of race, color, national origin, age, disability, sex, sexual orientation, or gender identity.            Thank you!     Thank you for choosing [facility]  for your care. Our goal is always to provide you with excellent care. Hearing back from our patients is one way we can continue to improve our services. Please take a few minutes to complete the written survey that you may receive in the mail after your visit with us. Thank you!             Your Updated Medication List - Protect others around you: Learn how to safely use, store and throw away your medicines at www.disposemymeds.org.      Notice  As of 9/15/2018  8:21 PM    You have not been prescribed any medications.

## 2018-09-16 NOTE — PROGRESS NOTES
SUBJECTIVE:    Nini Wang  presents to clinic today for evaluation of acute right ear pain x 1 day duration. Pt admits to some background nasal congestion and a mild,  dry, non-productive cough x several  days duration. Patient reports fever (Home T-Max 100).     Despite above acute illness sxs, parent reports patient still alert, active and taking in PO solids and fluids.  Normal BM's and urination.       ROS:     HEENT: positive right ear pain as per above. Denies any ear drainage. Denies any swelling around ear or neck. Positive nasal congestion with clear rhinorrhea.   RESP: Positive cough as per above. Despite cough, denies any severe SOB.  Denies any hx of asthma or RAD.   GI: Denies any vomiting or diarrhea.No abdominal pain. Normal BM's  SKIN: Denies rash  NEURO: No stiff neck. No HA. No lethargy    Past Medical History:   Diagnosis Date     Anemia     taking iron supplement     Blood type A+      Depression     age 17-21 - now in remission     Low iron stores     has taken iron in 2014     Uncomplicated asthma     exercise induced asthma     No current outpatient prescriptions on file.     No current facility-administered medications for this visit.      Allergies   Allergen Reactions     Prednisone      unsure         OBJECTIVE:  /60 (BP Location: Right arm, Patient Position: Chair, Cuff Size: Adult Regular)  Pulse 73  Temp 98.3  F (36.8  C) (Oral)  Wt 165 lb (74.8 kg)  SpO2 98%  BMI 28.32 kg/m2        General appearance: alert and no apparent distress  Skin color is uniform in color and without rash.  HEENT:   Conjunctiva not injected.  Sclera clear.  Left TM is normal: no effusions, no erythema, and normal landmarks.  Right TM is intact and normal in color. There is a small amount of clear fluid and a few air bubbles present   Nasal mucosa is congested with copious amount of clear rhinorrhea    Oropharyngeal exam is positive for mild, diffuse, erythema.  No plaque, exudate, lesions,  "or ulcers.   Neck is supple, FROM. No pain or stiffness with ROM. No adenopathy  CARDIAC:NORMAL - regular rate and rhythm without murmur.  RESP: Normal - CTA without rales, rhonchi, or wheezing.  NEURO: Alert and oriented.  Normal speech and mentation.  CN II/XII grossly intact.  Gait within normal limits.          LABS:     Results for orders placed or performed in visit on 11/06/17   Hemoglobin   Result Value Ref Range    Hemoglobin 12.1 11.7 - 15.7 g/dL         ASSESSMENT/PLAN:      (H69.81) Dysfunction of right eustachian tube  Comment: No evidence of secondary bacterial infection. Very reassuring exam today.     Plan: follow-up with PCP  if sxs change, worsen or fail to resolve with home comfort care measures over the next 5-7 days.       1. Patient has allergy to prednisone so I did not start her on a nasal steroid. Advised she may try OTC decongestant as tolerated.   2. Follow-up with PCP if sxs change, worsen or fail to fully resolve with above tx.  3.  In addition to the above, viral URI  And ETD\"red flag\" signs and sxs are reviewed with parent both verbally and by way of printed educational material for home review.  Parent verbalizes understanding of and agrees to the above plan.       (J06.9,  B97.89) Viral URI  (primary encounter diagnosis)          "

## 2018-09-16 NOTE — PATIENT INSTRUCTIONS
Viral Upper Respiratory Illness (Adult)  You have a viral upper respiratory illness (URI), which is another term for the common cold. This illness is contagious during the first few days. It is spread through the air by coughing and sneezing. It may also be spread by direct contact (touching the sick person and then touching your own eyes, nose, or mouth). Frequent handwashing will decrease risk of spread. Most viral illnesses go away within 7 to 10 days with rest and simple home remedies. Sometimes the illness may last for several weeks. Antibiotics will not kill a virus, and they are generally not prescribed for this condition.    Home care    If symptoms are severe, rest at home for the first 2 to 3 days. When you resume activity, don't let yourself get too tired.    Avoid being exposed to cigarette smoke (yours or others ).    You may use acetaminophen or ibuprofen to control pain and fever, unless another medicine was prescribed. If you have chronic liver or kidney disease, have ever had a stomach ulcer or gastrointestinal bleeding, or are taking blood-thinning medicines, talk with your healthcare provider before using these medicines. Aspirin should never be given to anyone under 18 years of age who is ill with a viral infection or fever. It may cause severe liver or brain damage.    Your appetite may be poor, so a light diet is fine. Avoid dehydration by drinking 6 to 8 glasses of fluids per day (water, soft drinks, juices, tea, or soup). Extra fluids will help loosen secretions in the nose and lungs.    Over-the-counter cold medicines will not shorten the length of time you re sick, but they may be helpful for the following symptoms: cough, sore throat, and nasal and sinus congestion. (Note: Do not use decongestants if you have high blood pressure.)  Follow-up care  Follow up with your healthcare provider, or as advised.  When to seek medical advice  Call your healthcare provider right away if any of these  occur:    Cough with lots of colored sputum (mucus)    Severe headache; face, neck, or ear pain    Difficulty swallowing due to throat pain    Fever of 100.4 F (38 C) or higher, or as directed by your healthcare provider  Call 911  Call 911 if any of these occur:    Chest pain, shortness of breath, wheezing, or difficulty breathing    Coughing up blood    Inability to swallow due to throat pain  Date Last Reviewed: 9/13/2015 2000-2017 [employer]. [address] [place] [address]. All rights reserved. This information is not intended as a substitute for professional medical care. Always follow your healthcare professional's instructions.        Earache, No Infection (Adult)  Earaches can happen without an infection. This occurs when air and fluid build up behind the eardrum causing a feeling of fullness and discomfort and reduced hearing. This is called otitis media with effusion (OME) or serous otitis media. It means there is fluid in the middle ear. It is not the same as acute otitis media, which is typically from infection.  OME can happen when you have a cold if congestion blocks the passage that drains the middle ear. This passage is called the eustachian tube. OME may also occur with nasal allergies or after a bacterial middle ear infection.    The pain or discomfort may come and go. You may hear clicking or popping sounds when you chew or swallow. You may feel that your balance is off. Or you may hear ringing in the ear.  It often takes from several weeks up to 3 months for the fluid to clear on its own. Oral pain relievers and ear drops help if there is pain. Decongestants and antihistamines sometimes help. Antibiotics don't help since there is no infection. Your doctor may prescribe a nasal spray to help reduce swelling in the nose and eustachian tube. This can allow the ear to drain.  If your OME doesn't improve after 3 months, surgery may be used to drain the fluid and insert a  small tube in the eardrum to allow continued drainage.  Because the middle ear fluid can become infected, it is important to watch for signs of an ear infection which may develop later. These signs include increased ear pain, fever, or drainage from the ear.  Home care  The following guidelines will help you care for yourself at home:    You may use over-the-counter medicine as directed to control pain, unless another medicine was prescribed. If you have chronic liver or kidney disease or ever had a stomach ulcer or GI bleeding, talk with your doctor before using these medicines. Aspirin should never be used in anyone under 18 years of age who is ill with a fever. It may cause severe liver damage.    You may use over-the-counter decongestants such as phenylephrine or pseudoephedrine. But they are not always helpful. Don't use nasal spray decongestants more than 3 days. Longer use can make congestion worse. Prescription nasal sprays from your doctor don't typically have those restrictions.    Antihistamines may help if you are also having allergy symptoms.    You may use medicines such as guaifenesin to thin mucus and promote drainage.  Follow-up care  Follow up with your healthcare provider or as advised if you are not feeling better after 3 days.  When to seek medical advice  Call your healthcare provider right away if any of the following occur:    Your ear pain gets worse or does not start to improve     Fever of 100.4 F (38 C) or higher, or as directed by your healthcare provider    Fluid or blood draining from the ear    Headache or sinus pain    Stiff neck    Unusual drowsiness or confusion  Date Last Reviewed: 10/1/2016    [id number] [employer] [address] [place] [address], [place] [address]. All rights reserved. This information is not intended as a substitute for professional medical care. Always follow your healthcare professional's instructions.

## 2018-09-25 ENCOUNTER — HEALTH MAINTENANCE LETTER (OUTPATIENT)
Age: 28
End: 2018-09-25

## 2018-11-07 ENCOUNTER — OFFICE VISIT (OUTPATIENT)
Dept: FAMILY MEDICINE | Facility: CLINIC | Age: 28
End: 2018-11-07
Payer: COMMERCIAL

## 2018-11-07 VITALS
TEMPERATURE: 98.1 F | SYSTOLIC BLOOD PRESSURE: 123 MMHG | WEIGHT: 164 LBS | BODY MASS INDEX: 28 KG/M2 | HEIGHT: 64 IN | OXYGEN SATURATION: 97 % | HEART RATE: 83 BPM | DIASTOLIC BLOOD PRESSURE: 79 MMHG

## 2018-11-07 DIAGNOSIS — Z00.00 ROUTINE HISTORY AND PHYSICAL EXAMINATION OF ADULT: ICD-10-CM

## 2018-11-07 DIAGNOSIS — R53.83 FATIGUE, UNSPECIFIED TYPE: ICD-10-CM

## 2018-11-07 DIAGNOSIS — Z23 NEED FOR PROPHYLACTIC VACCINATION AND INOCULATION AGAINST INFLUENZA: Primary | ICD-10-CM

## 2018-11-07 LAB
ERYTHROCYTE [DISTWIDTH] IN BLOOD BY AUTOMATED COUNT: 13.8 % (ref 10–15)
HCT VFR BLD AUTO: 40.1 % (ref 35–47)
HGB BLD-MCNC: 12.8 G/DL (ref 11.7–15.7)
MCH RBC QN AUTO: 25.2 PG (ref 26.5–33)
MCHC RBC AUTO-ENTMCNC: 31.9 G/DL (ref 31.5–36.5)
MCV RBC AUTO: 79 FL (ref 78–100)
PLATELET # BLD AUTO: 337 10E9/L (ref 150–450)
RBC # BLD AUTO: 5.08 10E12/L (ref 3.8–5.2)
WBC # BLD AUTO: 8.7 10E9/L (ref 4–11)

## 2018-11-07 PROCEDURE — 85027 COMPLETE CBC AUTOMATED: CPT | Performed by: FAMILY MEDICINE

## 2018-11-07 PROCEDURE — 36415 COLL VENOUS BLD VENIPUNCTURE: CPT | Performed by: FAMILY MEDICINE

## 2018-11-07 PROCEDURE — 84443 ASSAY THYROID STIM HORMONE: CPT | Performed by: FAMILY MEDICINE

## 2018-11-07 PROCEDURE — G0145 SCR C/V CYTO,THINLAYER,RESCR: HCPCS | Performed by: FAMILY MEDICINE

## 2018-11-07 PROCEDURE — 80061 LIPID PANEL: CPT | Performed by: FAMILY MEDICINE

## 2018-11-07 PROCEDURE — 99395 PREV VISIT EST AGE 18-39: CPT | Mod: 25 | Performed by: FAMILY MEDICINE

## 2018-11-07 PROCEDURE — 90471 IMMUNIZATION ADMIN: CPT | Performed by: FAMILY MEDICINE

## 2018-11-07 PROCEDURE — 90686 IIV4 VACC NO PRSV 0.5 ML IM: CPT | Performed by: FAMILY MEDICINE

## 2018-11-07 PROCEDURE — 80053 COMPREHEN METABOLIC PANEL: CPT | Performed by: FAMILY MEDICINE

## 2018-11-07 ASSESSMENT — ENCOUNTER SYMPTOMS
FREQUENCY: 0
DIZZINESS: 0
EYE PAIN: 0
PALPITATIONS: 0
NAUSEA: 0
CONSTIPATION: 0
DIARRHEA: 0
ARTHRALGIAS: 0
SHORTNESS OF BREATH: 0
SORE THROAT: 0
NERVOUS/ANXIOUS: 0
PARESTHESIAS: 0
ABDOMINAL PAIN: 0
MYALGIAS: 0
WEAKNESS: 0
DYSURIA: 0
CHILLS: 0
COUGH: 0
FEVER: 0
HEADACHES: 0
BREAST MASS: 0
HEARTBURN: 0
HEMATURIA: 0
JOINT SWELLING: 0
HEMATOCHEZIA: 0

## 2018-11-07 NOTE — PROGRESS NOTES
SUBJECTIVE:   CC: Nini Wang is an 28 year old woman who presents for preventive health visit.     Physical   Annual:     Getting at least 3 servings of Calcium per day:  Yes    Bi-annual eye exam:  Yes    Dental care twice a year:  Yes    Sleep apnea or symptoms of sleep apnea:  None    Diet:  Regular (no restrictions)    Frequency of exercise:  4-5 days/week    Duration of exercise:  30-45 minutes    Taking medications regularly:  Yes    Medication side effects:  Not applicable    Additional concerns today:  No            Today's PHQ-2 Score:   PHQ-2 ( 1999 [employer]) 11/7/2018   Q1: Little interest or pleasure in doing things 1   Q2: Feeling down, depressed or hopeless 0   PHQ-2 Score 1   Q1: Little interest or pleasure in doing things Several days   Q2: Feeling down, depressed or hopeless Not at all   PHQ-2 Score 1       Abuse: Current or Past(Physical, Sexual or Emotional)- No  Do you feel safe in your environment - Yes    Social History   Substance Use Topics     Smoking status: Never Smoker     Smokeless tobacco: Never Used     Alcohol use No      Comment: not while pregnant     Alcohol Use 11/7/2018   If you drink alcohol do you typically have greater than 3 drinks per day OR greater than 7 drinks per week? No   No flowsheet data found.    Reviewed orders with patient.  Reviewed health maintenance and updated orders accordingly - Yes  Labs reviewed in EPIC    Mammogram not appropriate for this patient based on age.    Pertinent mammograms are reviewed under the imaging tab.  History of abnormal Pap smear: NO - age 30- 65 PAP every 3 years recommended  PAP / HPV 7/22/2015   PAP NIL     Reviewed and updated as needed this visit by clinical staff  Tobacco  Allergies  Meds  Med Hx  Surg Hx  Fam Hx  Soc Hx        Reviewed and updated as needed this visit by Provider        Subjective:  Nini Wang is a 28 year old female  who presents today for a Physical Exam.  She has not had an abnormal PAP  "smear.  She has NOT had her cholesterol checked in the past.  Her last mammogram was never. She has only one concern at this time and that is that she is very tired.  She was thinking it was her baby for awhile but he is sleeping well and she is not nursing anymore and she is still tired.   She does have a family hx of thyroid issues but she has never had trouble with this.   She did have some anemia in her pregnancy and as a teen had low sodium.       No current outpatient prescriptions on file.       Past Medical History:   Diagnosis Date     Anemia     taking iron supplement     Blood type A+      Depression     age [age] - now in remission     Low iron stores     has taken iron in 2014     Uncomplicated asthma     exercise induced asthma       Past Surgical History:   Procedure Laterality Date     INSERT INTRAUTERINE DEVICE  12/18/2017    needs to be removed 12/18/22     NO HISTORY OF SURGERY         Family History   Problem Relation Age of Onset     Thyroid Disease Mother      GASTROINTESTINAL DISEASE Father      small bowel obstructions     Dementia Maternal Grandmother      vascular     Lung Cancer Paternal Grandmother      Pancreatic Cancer Paternal Grandfather 78       Social History   Substance Use Topics     Smoking status: Never Smoker     Smokeless tobacco: Never Used     Alcohol use No      Comment: not while pregnant       Objective:  Blood pressure 123/79, pulse 83, temperature 98.1  F (36.7  C), temperature source Oral, height 5' 3.75\" (1.619 m), weight 164 lb (74.4 kg), last menstrual period 10/29/2018, SpO2 97 %, currently breastfeeding.  General appearance: Healthy.  Mental Status: cooperative, normal affect, no gross thought process defects.  HEENT:   Ears- Normal external canals and TMs  Eyes- PERRL, EOM's intact, fundi benign, sharp disc margins.  Throat- Normal   Neck- no carotid bruits noted  Nodes- Negative  Thyroid: Normal to palpation, no enlargement or nodules noted.  Breasts: Symmetric " without mass tenderness or discharge.  Axillary nodes negative.  Lungs: Clear to auscultation bilaterally  Heart.: Normal rate and rhythm.  No murmurs, clicks or gallops.  Pulses:    R-4/4, PT-4/4, DP-4/4  Abdomen: BS active. Soft, non-tender, no masses or organomegaly.  Aorta normal. No bruits.  Genitalia: Normal female external genitalia without lesions.  Speculum:  Cervix normal and Mirena string is seen,  Pap taken, Bimanual exam - normal size uterus, no adenexal mass or tenderness.  Extremities: Normal without edema  Reflexes:  Symmetric bilaterally at the patella  Skin: Clear and free of rash.    Assessment:  1. Nini Wang is a healthy 28 year old female here for health care maintainence issues.  2. Feeling tired   3. Due for pap  4. Would like flu shot    Plan:  1. A Pap smear was obtained  2. Labs ordered per [facility] orders  3. Screening Mammogram was NOT ordered  4.  Flu shot given  5. Patient is to follow-up in 1 year and as needed.      Review of Systems   Constitutional: Negative for chills and fever.   HENT: Negative for congestion, ear pain, hearing loss and sore throat.    Eyes: Negative for pain and visual disturbance.   Respiratory: Negative for cough and shortness of breath.    Cardiovascular: Negative for chest pain, palpitations and peripheral edema.   Gastrointestinal: Negative for abdominal pain, constipation, diarrhea, heartburn, hematochezia and nausea.   Breasts:  Negative for tenderness, breast mass and discharge.   Genitourinary: Negative for dysuria, frequency, genital sores, hematuria, pelvic pain, urgency, vaginal bleeding and vaginal discharge.   Musculoskeletal: Negative for arthralgias, joint swelling and myalgias.   Skin: Negative for rash.   Neurological: Negative for dizziness, weakness, headaches and paresthesias.   Psychiatric/Behavioral: Negative for mood changes. The patient is not nervous/anxious.    Physical Exam      COUNSELING:  Reviewed preventive health counseling,  "as reflected in patient instructions  Special attention given to:        Regular exercise       Healthy diet/nutrition    BP Readings from Last 1 Encounters:   11/07/18 123/79     Estimated body mass index is 28.37 kg/(m^2) as calculated from the following:    Height as of this encounter: 5' 3.75\" (1.619 m).    Weight as of this encounter: 164 lb (74.4 kg).           reports that she has never smoked. She has never used smokeless tobacco.      Counseling Resources:  ATP IV Guidelines  Pooled Cohorts Equation Calculator  Breast Cancer Risk Calculator  FRAX Risk Assessment  ICSI Preventive Guidelines  Dietary Guidelines for Americans, 2010  USDA's MyPlate  ASA Prophylaxis  Lung CA Screening    Judit Colbert MD  [facility]  Answers for HPI/ROS submitted by the patient on 11/7/2018   PHQ-2 Score: 1    "

## 2018-11-07 NOTE — PROGRESS NOTES

## 2018-11-07 NOTE — MR AVS SNAPSHOT
After Visit Summary   11/7/2018    Nini Wang    MRN: [id number]           Patient Information     Date Of Birth          1990        Visit Information        Provider Department      11/7/2018 1:45 PM Judit Colbert MD [facility]        Today's Diagnoses     Need for prophylactic vaccination and inoculation against influenza    -  1    Routine history and physical examination of adult        Fatigue, unspecified type           Follow-ups after your visit        Follow-up notes from your care team     Return in about 1 year (around 11/7/2019) for Physical Exam.      Who to contact     If you have questions or need follow up information about today's clinic visit or your schedule please contact [facility] directly at [phone].  Normal or non-critical lab and imaging results will be communicated to you by MyChart, letter or phone within 4 business days after the clinic has received the results. If you do not hear from us within 7 days, please contact the clinic through [employer] or phone. If you have a critical or abnormal lab result, we will notify you by phone as soon as possible.  Submit refill requests through [employer] or call your pharmacy and they will forward the refill request to us. Please allow 3 business days for your refill to be completed.          Additional Information About Your Visit        MyChart Information     [employer] gives you secure access to your electronic health record. If you see a primary care provider, you can also send messages to your care team and make appointments. If you have questions, please call your primary care clinic.  If you do not have a primary care provider, please call [phone] and they will assist you.        Care EveryWhere ID     This is your Care EveryWhere ID. This could be used by other organizations to access your [place] medical records  [id number]        Your Vitals Were     Pulse Temperature  "Height Last Period Pulse Oximetry BMI (Body Mass Index)    83 98.1  F (36.7  C) (Oral) 5' 3.75\" (1.619 m) 10/29/2018 (Approximate) 97% 28.37 kg/m2       Blood Pressure from Last 3 Encounters:   11/07/18 123/79   09/15/18 120/60   05/04/18 106/72    Weight from Last 3 Encounters:   11/07/18 164 lb (74.4 kg)   09/15/18 165 lb (74.8 kg)   05/04/18 158 lb (71.7 kg)              We Performed the Following     CBC with platelets     Comprehensive metabolic panel     FLU VACCINE, SPLIT VIRUS, IM (QUADRIVALENT) [28777]- >3 YRS     Lipid panel reflex to direct LDL Non-fasting     TSH with free T4 reflex     Vaccine Administration, Initial [[id number]]        Primary Care Provider Office Phone # Fax #    Judit Colbert [phone] [address] [facility] [address]        Equal Access to Services     [facility] : Hadii nat ku hadasho Soomaali, waaxda luqadaha, qaybta kaalmada adeegyada, oniel hodge . So [facility] [phone].    ATENCIÓN: Si habla español, tiene a miller disposición servicios gratuitos de asistencia lingüística. Llame al [phone].    We comply with applicable federal civil rights laws and [place] laws. We do not discriminate on the basis of race, color, national origin, age, disability, sex, sexual orientation, or gender identity.            Thank you!     Thank you for choosing [facility]  for your care. Our goal is always to provide you with excellent care. Hearing back from our patients is one way we can continue to improve our services. Please take a few minutes to complete the written survey that you may receive in the mail after your visit with us. Thank you!             Your Updated Medication List - Protect others around you: Learn how to safely use, store and throw away your medicines at www.disposemymeds.org.      Notice  As of 11/7/2018  2:11 PM    You have not been prescribed any medications.      "

## 2018-11-08 LAB
ALBUMIN SERPL-MCNC: 4.2 G/DL (ref 3.4–5)
ALP SERPL-CCNC: 69 U/L (ref 40–150)
ALT SERPL W P-5'-P-CCNC: 18 U/L (ref 0–50)
ANION GAP SERPL CALCULATED.3IONS-SCNC: 7 MMOL/L (ref 3–14)
AST SERPL W P-5'-P-CCNC: 13 U/L (ref 0–45)
BILIRUB SERPL-MCNC: 0.8 MG/DL (ref 0.2–1.3)
BUN SERPL-MCNC: 11 MG/DL (ref 7–30)
CALCIUM SERPL-MCNC: 9.1 MG/DL (ref 8.5–10.1)
CHLORIDE SERPL-SCNC: 105 MMOL/L (ref 94–109)
CHOLEST SERPL-MCNC: 123 MG/DL
CO2 SERPL-SCNC: 27 MMOL/L (ref 20–32)
CREAT SERPL-MCNC: 0.78 MG/DL (ref 0.52–1.04)
GFR SERPL CREATININE-BSD FRML MDRD: 87 ML/MIN/1.7M2
GLUCOSE SERPL-MCNC: 88 MG/DL (ref 70–99)
HDLC SERPL-MCNC: 35 MG/DL
LDLC SERPL CALC-MCNC: 64 MG/DL
NONHDLC SERPL-MCNC: 88 MG/DL
POTASSIUM SERPL-SCNC: 3.6 MMOL/L (ref 3.4–5.3)
PROT SERPL-MCNC: 7.5 G/DL (ref 6.8–8.8)
SODIUM SERPL-SCNC: 139 MMOL/L (ref 133–144)
TRIGL SERPL-MCNC: 122 MG/DL
TSH SERPL DL<=0.005 MIU/L-ACNC: 1.78 MU/L (ref 0.4–4)

## 2018-11-09 LAB
COPATH REPORT: NORMAL
PAP: NORMAL

## 2019-03-04 ENCOUNTER — OFFICE VISIT (OUTPATIENT)
Dept: FAMILY MEDICINE | Facility: CLINIC | Age: 29
End: 2019-03-04
Payer: COMMERCIAL

## 2019-03-04 VITALS
DIASTOLIC BLOOD PRESSURE: 79 MMHG | HEART RATE: 88 BPM | SYSTOLIC BLOOD PRESSURE: 130 MMHG | RESPIRATION RATE: 16 BRPM | TEMPERATURE: 98.6 F | WEIGHT: 164 LBS | HEIGHT: 64 IN | BODY MASS INDEX: 28 KG/M2

## 2019-03-04 DIAGNOSIS — Z30.432 ENCOUNTER FOR IUD REMOVAL: Primary | ICD-10-CM

## 2019-03-04 PROCEDURE — 99207 ZZC NO CHARGE LOS: CPT | Performed by: FAMILY MEDICINE

## 2019-03-04 PROCEDURE — 58301 REMOVE INTRAUTERINE DEVICE: CPT | Performed by: FAMILY MEDICINE

## 2019-03-04 ASSESSMENT — MIFFLIN-ST. JEOR: SCORE: 1440.96

## 2019-03-04 NOTE — PROGRESS NOTES
"  SUBJECTIVE:   Nini Wang is a 29 year old female who presents to clinic today for the following health issues:    She is here for removal due to wanting to conceive.      IUD Removal -         Past Medical History:   Diagnosis Date     Anemia     taking iron supplement     Blood type A+      Depression     age [age] - now in remission     Low iron stores     has taken iron in 2014     Uncomplicated asthma     exercise induced asthma       Past Surgical History:   Procedure Laterality Date     INSERT INTRAUTERINE DEVICE  12/18/2017    needs to be removed 12/18/22     NO HISTORY OF SURGERY         MEDICATIONS:  No current outpatient medications on file.     No current facility-administered medications for this visit.        SOCIAL HISTORY:  Social History     Tobacco Use     Smoking status: Never Smoker     Smokeless tobacco: Never Used   Substance Use Topics     Alcohol use: No     Comment: not while pregnant       Family History   Problem Relation Age of Onset     Thyroid Disease Mother      Gastrointestinal Disease Father         small bowel obstructions     Dementia Maternal Grandmother         vascular     Lung Cancer Paternal Grandmother      Pancreatic Cancer Paternal Grandfather 78       Objective:  Blood pressure 130/79, pulse 88, temperature 98.6  F (37  C), temperature source Oral, resp. rate 16, height 1.613 m (5' 3.5\"), weight 74.4 kg (164 lb), currently breastfeeding.  Chest: Clear to auscultation bilaterally.  No wheezes, rales or retractions.  CV: Regular rate and rhythm without murmurs, rubs or gallops.  Pelvic: cervix visualized and multiparous - IUD string seen easily - grasped with ringed forceps and removed with gentle traction    Assessment:  1. IUD removal    Plan:  1. IUD removed  2. Recommend prenatal vitamin  3. CPE due next fall        "

## 2019-04-22 ENCOUNTER — MYC MEDICAL ADVICE (OUTPATIENT)
Dept: FAMILY MEDICINE | Facility: CLINIC | Age: 29
End: 2019-04-22

## 2019-04-24 ENCOUNTER — TELEPHONE (OUTPATIENT)
Dept: FAMILY MEDICINE | Facility: CLINIC | Age: 29
End: 2019-04-24

## 2019-04-24 ENCOUNTER — PRENATAL OFFICE VISIT (OUTPATIENT)
Dept: NURSING | Facility: CLINIC | Age: 29
End: 2019-04-24
Payer: COMMERCIAL

## 2019-04-24 VITALS
SYSTOLIC BLOOD PRESSURE: 133 MMHG | OXYGEN SATURATION: 98 % | HEART RATE: 102 BPM | DIASTOLIC BLOOD PRESSURE: 84 MMHG | BODY MASS INDEX: 28.37 KG/M2 | RESPIRATION RATE: 16 BRPM | WEIGHT: 162.7 LBS

## 2019-04-24 DIAGNOSIS — Z33.1 PREGNANCY, INCIDENTAL: Primary | ICD-10-CM

## 2019-04-24 LAB
ABO + RH BLD: NORMAL
ABO + RH BLD: NORMAL
BLD GP AB SCN SERPL QL: NORMAL
BLOOD BANK CMNT PATIENT-IMP: NORMAL
ERYTHROCYTE [DISTWIDTH] IN BLOOD BY AUTOMATED COUNT: 13.7 % (ref 10–15)
HCG UR QL: POSITIVE
HCT VFR BLD AUTO: 38.4 % (ref 35–47)
HGB BLD-MCNC: 12.7 G/DL (ref 11.7–15.7)
MCH RBC QN AUTO: 25.8 PG (ref 26.5–33)
MCHC RBC AUTO-ENTMCNC: 33.1 G/DL (ref 31.5–36.5)
MCV RBC AUTO: 78 FL (ref 78–100)
PLATELET # BLD AUTO: 338 10E9/L (ref 150–450)
RBC # BLD AUTO: 4.93 10E12/L (ref 3.8–5.2)
SPECIMEN EXP DATE BLD: NORMAL
WBC # BLD AUTO: 7.8 10E9/L (ref 4–11)

## 2019-04-24 PROCEDURE — 87086 URINE CULTURE/COLONY COUNT: CPT | Performed by: FAMILY MEDICINE

## 2019-04-24 PROCEDURE — 85027 COMPLETE CBC AUTOMATED: CPT | Performed by: FAMILY MEDICINE

## 2019-04-24 PROCEDURE — 86850 RBC ANTIBODY SCREEN: CPT | Performed by: FAMILY MEDICINE

## 2019-04-24 PROCEDURE — 86762 RUBELLA ANTIBODY: CPT | Performed by: FAMILY MEDICINE

## 2019-04-24 PROCEDURE — 87340 HEPATITIS B SURFACE AG IA: CPT | Performed by: FAMILY MEDICINE

## 2019-04-24 PROCEDURE — 99207 ZZC NO CHARGE NURSE ONLY: CPT

## 2019-04-24 PROCEDURE — 86901 BLOOD TYPING SEROLOGIC RH(D): CPT | Performed by: FAMILY MEDICINE

## 2019-04-24 PROCEDURE — 0064U ANTB TP TOTAL&RPR IA QUAL: CPT | Performed by: FAMILY MEDICINE

## 2019-04-24 PROCEDURE — 86900 BLOOD TYPING SEROLOGIC ABO: CPT | Performed by: FAMILY MEDICINE

## 2019-04-24 PROCEDURE — 81025 URINE PREGNANCY TEST: CPT | Performed by: FAMILY MEDICINE

## 2019-04-24 PROCEDURE — 36415 COLL VENOUS BLD VENIPUNCTURE: CPT | Performed by: FAMILY MEDICINE

## 2019-04-24 PROCEDURE — 87491 CHLMYD TRACH DNA AMP PROBE: CPT | Performed by: FAMILY MEDICINE

## 2019-04-24 PROCEDURE — 87591 N.GONORRHOEAE DNA AMP PROB: CPT | Performed by: FAMILY MEDICINE

## 2019-04-24 PROCEDURE — 87389 HIV-1 AG W/HIV-1&-2 AB AG IA: CPT | Performed by: FAMILY MEDICINE

## 2019-04-24 RX ORDER — PRENATAL VIT/IRON FUM/FOLIC AC 27MG-0.8MG
1 TABLET ORAL DAILY
Qty: 90 TABLET | Refills: 3 | COMMUNITY
Start: 2019-04-24 | End: 2021-07-06

## 2019-04-24 NOTE — NURSING NOTE
Subjective: 29 year old patient presents for pregnancy confirmation. Her last menstrual period was 3/20/19. She has had a + HPT This is her 2nd pregnancy, G2, P1. She has had previous vaginal delivery. She would like to be seen Dr. Cedeno for her prenatal care. She has started prenatal vitamins.      Exam:  General Appearance: appears well.  Her urine pregnancy test is positive.    Assessment: positive pregnancy confirmation.    Plan: Patient has an EDC of 12/25/19. Is currently 5 weeks along. I tried to schedule her first OB appointment with Dr. Cedeno but schedules on hold.  She will call to schedule. Risk assessment done. We discussed basic pregnancy care, diet, exercise and prenatal vitamins.     Pre Term Labor Risk Assessment   1. Is the patient's age <18 or >40? no  2. Does the patient have a BMI < 18.5? no  3. If previous pregnancy, was delivery within previous 6 months? no  4. Have you ever been diagnosed with pyelonephritis? no  5. Have you ever delivered a baby prior to 37 weeks gestation? no  6. Have you ever been told you have a uterine anomaly? no  7. Do you currently have uterine fibroids? no  8. Have you had any gynecological surgical procedures such as cervical conization, a LEEP procedure, laser treatment or cryosurgery of the cervix? no  9. Did your mother take DEWAYNE or any other hormones when she was pregnant with you? no  10. Did conception for this pregnancy occur via In Vitro Fertilization? no  11. Are you carrying twins? no  12. Do you currently use tobacco products? no  13. Prior to this pregnancy, how much alcohol did you drink each week? (if >7/week= high risk..)  once  14. Since you learned you were pregnant, how much beer, wine or hard liquor did you drink per week? (anything >0 = high risk) no  15. Prior to learning you are pregnant, did you use any of the following: marijuana, prescription narcotics, speed, cocaine, heroin, hallucinogens or other drugs? (any use within 1 yr = high risk)   no  16. Since you learned you are pregnant, have you used any of the following: marijuana, prescription narcotics, speed, cocaine, heroin, hallucinogens or other drugs? (any use = high risk)  no  17. Do you have a history of chemical dependency (yes=high risk)  no  18. Have you ever been treated for more than 1 Urinary Tract Infection during this pregnancy? no  19. Do you have a history of Depression, Bi-polar disorder, anxiety, schizophrenia or other mental illness?  yes  20. Are you currently being treated for depression, bi-polar disorder, anxiety, schizophrenia or other mental illness? no  21. Have you had Chlamydia or gonorrhea during this pregnancy? no  22. Periodontal disease (gum disease)? no  23. Has anyone hit, slapped, kicked or otherwise hurt you? no  24. Has anyone forced you to have sex when you didn't want to? no  Summary:   Patient is not high risk for  Labor

## 2019-04-24 NOTE — TELEPHONE ENCOUNTER
Received note that patient scheduled 1st OB for 6/17/19 with Dr. Cedeno but is only 15 minute appt.  Patient will be about 14 weeks if comes 6/17/19.  Called patient.  Offered 3:30 pm appt so has 30 minute appt.  Discussed again that appt's currently on hold but will open up.  She will cancel for now and check back for appt 6/3/19.  Patient states new ob not an option on mychart.  Advised to choose physical and make comment that it is her 1st OB so have correct appt length.  Can update appt type prior to appt.  Jessica Conti RN

## 2019-04-24 NOTE — TELEPHONE ENCOUNTER
Patient here for 1st OB nurse visit.  Please sign U/S order.  Information given at visit.  Can close after signing.  Jessica Conti RN

## 2019-04-25 LAB
BACTERIA SPEC CULT: NORMAL
C TRACH DNA SPEC QL NAA+PROBE: NEGATIVE
HBV SURFACE AG SERPL QL IA: NONREACTIVE
HIV 1+2 AB+HIV1 P24 AG SERPL QL IA: NONREACTIVE
N GONORRHOEA DNA SPEC QL NAA+PROBE: NEGATIVE
RUBV IGG SERPL IA-ACNC: 19 IU/ML
SPECIMEN SOURCE: NORMAL
T PALLIDUM AB SER QL: NONREACTIVE

## 2019-05-06 ENCOUNTER — TELEPHONE (OUTPATIENT)
Dept: FAMILY MEDICINE | Facility: CLINIC | Age: 29
End: 2019-05-06

## 2019-05-06 ENCOUNTER — HOSPITAL ENCOUNTER (OUTPATIENT)
Dept: LAB | Facility: CLINIC | Age: 29
Discharge: HOME | End: 2019-05-06
Attending: FAMILY MEDICINE | Admitting: FAMILY MEDICINE
Payer: COMMERCIAL

## 2019-05-06 DIAGNOSIS — O20.9 VAGINAL BLEEDING IN PREGNANCY, FIRST TRIMESTER: ICD-10-CM

## 2019-05-06 DIAGNOSIS — O20.9 VAGINAL BLEEDING IN PREGNANCY, FIRST TRIMESTER: Primary | ICD-10-CM

## 2019-05-06 LAB — B-HCG SERPL-ACNC: 87 IU/L (ref 0–5)

## 2019-05-06 PROCEDURE — 84702 CHORIONIC GONADOTROPIN TEST: CPT | Performed by: FAMILY MEDICINE

## 2019-05-06 PROCEDURE — 36415 COLL VENOUS BLD VENIPUNCTURE: CPT | Performed by: FAMILY MEDICINE

## 2019-05-06 NOTE — TELEPHONE ENCOUNTER
Left message to call back.  Let's help her find a lab only appointment today and WEdnesday.  Could walk in to [facility] lab until 7 PM.  Or go to a [facility] before 9 PM.  Future HCG quant orders placed.     We will warm transfer call to rad scheduling to reschedule 5/15/19 US to  tomorrow [phone] ( or ask referrals to assist)   Charles Osorio RN

## 2019-05-06 NOTE — TELEPHONE ENCOUNTER
Patient called back.  Gave below information.  Advised to call back if issues scheduling.  Jessica Conti RN

## 2019-05-06 NOTE — TELEPHONE ENCOUNTER
Patient calling and states is pregnant (6w5d) and started spotting yesterday.  Bleeding a little heavier today and more like a period and cramping today.  L/M on V/M as is not available due to work today til 5 pm.  Will check V/M when she can.  Please advise.  Call her back at [phone].  Jessica Conti RN

## 2019-05-07 ENCOUNTER — HOSPITAL ENCOUNTER (OUTPATIENT)
Dept: ULTRASOUND IMAGING | Facility: CLINIC | Age: 29
Discharge: HOME | End: 2019-05-07
Attending: FAMILY MEDICINE | Admitting: FAMILY MEDICINE
Payer: COMMERCIAL

## 2019-05-07 DIAGNOSIS — Z33.1 PREGNANCY, INCIDENTAL: ICD-10-CM

## 2019-05-07 PROCEDURE — 76801 OB US < 14 WKS SINGLE FETUS: CPT

## 2019-05-08 DIAGNOSIS — O20.9 VAGINAL BLEEDING IN PREGNANCY, FIRST TRIMESTER: ICD-10-CM

## 2019-05-08 PROCEDURE — 84702 CHORIONIC GONADOTROPIN TEST: CPT | Performed by: FAMILY MEDICINE

## 2019-05-08 PROCEDURE — 36415 COLL VENOUS BLD VENIPUNCTURE: CPT | Performed by: FAMILY MEDICINE

## 2019-05-09 ENCOUNTER — TELEPHONE (OUTPATIENT)
Dept: FAMILY MEDICINE | Facility: CLINIC | Age: 29
End: 2019-05-09

## 2019-05-09 LAB — B-HCG SERPL-ACNC: 27 IU/L (ref 0–5)

## 2019-05-09 NOTE — TELEPHONE ENCOUNTER
Patient called asking about there recent lab results. Wondering if we can have someone look at them and get back to patient. She doesn't want to wait till Monday.     Pepper Richardson/GHADA

## 2019-05-10 ENCOUNTER — TELEPHONE (OUTPATIENT)
Dept: FAMILY MEDICINE | Facility: CLINIC | Age: 29
End: 2019-05-10

## 2019-05-10 NOTE — TELEPHONE ENCOUNTER
Pt reviewed Dr. Cedeno's lab results comment in MyChart this morning.      Viewed by Nini Wang on 5/10/2019  5:45 AM   Written by Judit Cedeno MD on 5/10/2019 12:47 AM   The hormone levels are going down.  This indicates miscarriage.   I would like to see you in the office next week or the following  Charles Osorio RN

## 2019-05-10 NOTE — TELEPHONE ENCOUNTER
LMTCB  Notes recorded by Judit Cedeno MD on 5/10/2019 at 12:47 AM CDT  The hormone levels are going down.  This indicates miscarriage.   I would like to see you in the office next week or the following  Please call to check on how she is doing - I am out of the office       We will offer appointment week of May 20 (unless dr. Cedeno has opening next week  - or pt's needs are high).  We will let her know she will likely get a call to clarify or tweak appointment time (due to our office's scheduling transition work in process)   Charles Osorio RN

## 2019-05-10 NOTE — TELEPHONE ENCOUNTER
Patient called back.  Discussed result note.  Patient currently doing fine.  Discussed UC or ER if anything concerning over the weekend such as increased bleeding, pain or fever.  She will call on Monday to see if appt's opened up.  Jessica Conti RN

## 2019-05-15 ENCOUNTER — OFFICE VISIT (OUTPATIENT)
Dept: FAMILY MEDICINE | Facility: CLINIC | Age: 29
End: 2019-05-15
Payer: COMMERCIAL

## 2019-05-15 VITALS
DIASTOLIC BLOOD PRESSURE: 64 MMHG | HEART RATE: 94 BPM | BODY MASS INDEX: 28.25 KG/M2 | WEIGHT: 162 LBS | OXYGEN SATURATION: 99 % | TEMPERATURE: 99 F | SYSTOLIC BLOOD PRESSURE: 108 MMHG

## 2019-05-15 DIAGNOSIS — O03.9 COMPLETE MISCARRIAGE: Primary | ICD-10-CM

## 2019-05-15 PROCEDURE — 99213 OFFICE O/P EST LOW 20 MIN: CPT | Performed by: FAMILY MEDICINE

## 2019-05-15 NOTE — PROGRESS NOTES
SUBJECTIVE:   Nini Wang is a 29 year old female who presents to clinic today for the following health issues:      HPI  Follow up - miscarriage.   She was about 7 weeks along.   Her bleeding lasted about 5 days.   It was stopped 2 days ago.   She had 2 quant HCG done and the levels were coming down.   The ultrasound did not show IUP.    She does not have the fatigue and breast tenderness of pregnancy anymore.       Past Medical History:   Diagnosis Date     Anemia     taking iron supplement     Blood type A+      Depression     age [age] - now in remission     Low iron stores     has taken iron in 2014     Uncomplicated asthma     exercise induced asthma       Past Surgical History:   Procedure Laterality Date     INSERT INTRAUTERINE DEVICE  12/18/2017    needs to be removed 12/18/22     NO HISTORY OF SURGERY         MEDICATIONS:  Current Outpatient Medications   Medication     Prenatal Vit-Fe Fumarate-FA (PRENATAL MULTIVITAMIN W/IRON) 27-0.8 MG tablet     No current facility-administered medications for this visit.        SOCIAL HISTORY:  Social History     Tobacco Use     Smoking status: Never Smoker     Smokeless tobacco: Never Used   Substance Use Topics     Alcohol use: Yes     Comment: occ       Family History   Problem Relation Age of Onset     Thyroid Disease Mother      Gastrointestinal Disease Father         small bowel obstructions     Dementia Maternal Grandmother         vascular     Lung Cancer Paternal Grandmother      Pancreatic Cancer Paternal Grandfather 78       Objective:  Blood pressure 108/64, pulse 94, temperature 99  F (37.2  C), temperature source Oral, weight 73.5 kg (162 lb), last menstrual period 03/20/2019, SpO2 99 %, currently breastfeeding.    HEENT:  TM's are clear bilaterally.  Oropharynx is clear without tonsillar hypertrophy or exudate.  Conjuctiva are clear.  Neck:  There is no lymphadenopathy or thyroid tenderness or enlargement  Chest: Clear to auscultation bilaterally.   No wheezes, rales or retractions.  CV: Regular rate and rhythm without murmurs, rubs or gallops.  ABDOMEN:  Positive bowel sounds, soft, nondistended and nontender.  No masses are palpated and there is no organomegaly or inguinal lymphadenopathy.    Assessment:  1. Miscarriage - complete        Plan:  1. Handout on the above diagnosis was given to the patient and discussed  2. Wait until after next menstrual cycle and then may try again  3. Continue PNV  4. No need for more blood work

## 2019-08-12 ENCOUNTER — OFFICE VISIT (OUTPATIENT)
Dept: URGENT CARE | Facility: URGENT CARE | Age: 29
End: 2019-08-12
Payer: COMMERCIAL

## 2019-08-12 ENCOUNTER — TELEPHONE (OUTPATIENT)
Dept: FAMILY MEDICINE | Facility: CLINIC | Age: 29
End: 2019-08-12

## 2019-08-12 VITALS
TEMPERATURE: 98.9 F | HEIGHT: 64 IN | WEIGHT: 162 LBS | SYSTOLIC BLOOD PRESSURE: 118 MMHG | OXYGEN SATURATION: 99 % | DIASTOLIC BLOOD PRESSURE: 66 MMHG | BODY MASS INDEX: 27.66 KG/M2 | HEART RATE: 83 BPM

## 2019-08-12 DIAGNOSIS — L98.0 PYOGENIC GRANULOMA: Primary | ICD-10-CM

## 2019-08-12 DIAGNOSIS — D18.01 HEMANGIOMA OF SUBCUTANEOUS TISSUE: ICD-10-CM

## 2019-08-12 PROCEDURE — 99213 OFFICE O/P EST LOW 20 MIN: CPT | Mod: 25 | Performed by: FAMILY MEDICINE

## 2019-08-12 PROCEDURE — 12001 RPR S/N/AX/GEN/TRNK 2.5CM/<: CPT | Performed by: FAMILY MEDICINE

## 2019-08-12 ASSESSMENT — MIFFLIN-ST. JEOR: SCORE: 1431.89

## 2019-08-12 NOTE — PROGRESS NOTES
"Subjective:   Nini Wang is a 29 year old female who presents for   Chief Complaint   Patient presents with     Sore     upper right chest bleeding, wont stop, not a skin tag/blood blister but wont stop bleeding x 8 days, is 5 weeks pregnant, LMP was 7/8/2019     Started bleeding about 5 hours ago. Reports there was a red lesion that appeared as a small area then grew in size into a pea over the course of a week. Has not had episodes like this before. No other family members with episodes as this.     She is currently 5 weeks pregnant.   No hx of bleeding disorders    Patient Active Problem List    Diagnosis Date Noted     Blood type A+      Priority: Medium     Low serum sodium 02/27/2017     Priority: Medium     Contraception 07/22/2015     Priority: Medium       Current Outpatient Medications   Medication     Prenatal Vit-Fe Fumarate-FA (PRENATAL MULTIVITAMIN W/IRON) 27-0.8 MG tablet     No current facility-administered medications for this visit.      ROS:  As above per HPI    Objective:   /66 (BP Location: Right arm, Patient Position: Chair, Cuff Size: Adult Regular)   Pulse 83   Temp 98.9  F (37.2  C) (Oral)   Ht 1.613 m (5' 3.5\")   Wt 73.5 kg (162 lb)   LMP 03/20/2019   SpO2 99%   Breastfeeding? No   BMI 28.25 kg/m  , Body mass index is 28.25 kg/m .  Gen:  NAD, well-nourished, sitting in chair comfortably  HEENT: EOMI, sclera anicteric, Head normocephalic, ; nares patent; moist mucous membranes  Neck: trachea midline, no thyromegaly  CV:  Hemodynamically stable  Pulm:  no increased work of breathing , CTAB, no wheezes/rales/rhonchi   Skin: upper right breast tissue area there is a bleeding dome shaped lesion which appears to not be pedunculated approx .4cm x .3cm   Psych: Euthymic, linear thoughts, normal rate of speech            Assessment & Plan:   Nini Wang, 29 year old female who presents with:    Hemangioma of subcutaneous tissue, 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 First several attempts with gel foam were unsuccessful.   Hemostasis was achieved with pressure with gelfoam performed by me and then direct application in 3 applications with silver nitrate. The lesion was observed for 15 minutes prior to her leaving, bandage applied to cover the area and prevent friction to the area.    Normal platelets in her lifetime per chart review, testing not done today. Given its history and  Her being early in pregnancy this is likely a pyogenic granuloma. Recommended dermatological referral to have this evaluated for possible removal.    - DERMATOLOGY REFERRAL    Ramirez Anthony MD   [place] [facility]    The use of Dragon/[employer] dictation services may have been used to construct the content in this note; any grammatical or spelling errors are non-intentional. Please contact the author of this note directly if you are in need of any clarification.

## 2019-08-12 NOTE — TELEPHONE ENCOUNTER
Pt calling into clinic  Developed pea sized spot week ago on chest, reddish brown in color,  thought maybe it was a mole, but started bleeding today, assumes she scratched it  Has been bleeding over 4 hrs despite applying pressure, soaks through pressure bandage in 15 min  Advised urgent care, agrees to plan  Also states she has had a positive pregnancy test    Nini Walter RN, BS  Clinical Nurse Triage.

## 2019-08-13 NOTE — PATIENT INSTRUCTIONS
Make appointment to see Dermatology to possibly have this removed    If the bleeding occurs again in the middle of the night go to the emergency room immediately to be seen

## 2019-08-14 ENCOUNTER — TELEPHONE (OUTPATIENT)
Dept: DERMATOLOGY | Facility: CLINIC | Age: 29
End: 2019-08-14

## 2019-08-14 NOTE — TELEPHONE ENCOUNTER
Patient called in. 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 Patient would like a removal with Dermatology. No availability in the near future at [place], [place], or [place]. Gave patient contact information for [facility] and the [facility]. Patient voiced understanding.    TAVIA Esqueda-BSN-PHN  [facility]  [phone]

## 2019-08-20 ENCOUNTER — TRANSFERRED RECORDS (OUTPATIENT)
Dept: HEALTH INFORMATION MANAGEMENT | Facility: CLINIC | Age: 29
End: 2019-08-20

## 2019-08-22 NOTE — ADDENDUM NOTE
Addended by: CHERELLE MARY on: 8/22/2019 08:44 AM     Modules accepted: Orders, Level of Service

## 2019-08-26 ENCOUNTER — TELEPHONE (OUTPATIENT)
Dept: OBGYN | Facility: CLINIC | Age: 29
End: 2019-08-26

## 2019-08-26 ENCOUNTER — PRENATAL OFFICE VISIT (OUTPATIENT)
Dept: OBGYN | Facility: OTHER | Age: 29
End: 2019-08-26
Payer: COMMERCIAL

## 2019-08-26 VITALS
WEIGHT: 165.5 LBS | BODY MASS INDEX: 28.25 KG/M2 | HEIGHT: 64 IN | HEART RATE: 78 BPM | SYSTOLIC BLOOD PRESSURE: 108 MMHG | DIASTOLIC BLOOD PRESSURE: 62 MMHG

## 2019-08-26 DIAGNOSIS — Z34.91 NORMAL PREGNANCY IN FIRST TRIMESTER: Primary | ICD-10-CM

## 2019-08-26 PROBLEM — Z23 NEED FOR TDAP VACCINATION: Status: ACTIVE | Noted: 2019-08-26

## 2019-08-26 LAB
ABO + RH BLD: NORMAL
ABO + RH BLD: NORMAL
BASOPHILS # BLD AUTO: 0 10E9/L (ref 0–0.2)
BASOPHILS NFR BLD AUTO: 0.1 %
BLD GP AB SCN SERPL QL: NORMAL
BLOOD BANK CMNT PATIENT-IMP: NORMAL
DIFFERENTIAL METHOD BLD: ABNORMAL
EOSINOPHIL # BLD AUTO: 0.3 10E9/L (ref 0–0.7)
EOSINOPHIL NFR BLD AUTO: 2.8 %
ERYTHROCYTE [DISTWIDTH] IN BLOOD BY AUTOMATED COUNT: 13.9 % (ref 10–15)
HCT VFR BLD AUTO: 37.3 % (ref 35–47)
HGB BLD-MCNC: 12.1 G/DL (ref 11.7–15.7)
LYMPHOCYTES # BLD AUTO: 2.7 10E9/L (ref 0.8–5.3)
LYMPHOCYTES NFR BLD AUTO: 28.4 %
MCH RBC QN AUTO: 25.4 PG (ref 26.5–33)
MCHC RBC AUTO-ENTMCNC: 32.4 G/DL (ref 31.5–36.5)
MCV RBC AUTO: 78 FL (ref 78–100)
MONOCYTES # BLD AUTO: 0.8 10E9/L (ref 0–1.3)
MONOCYTES NFR BLD AUTO: 8.6 %
NEUTROPHILS # BLD AUTO: 5.8 10E9/L (ref 1.6–8.3)
NEUTROPHILS NFR BLD AUTO: 60.1 %
PLATELET # BLD AUTO: 321 10E9/L (ref 150–450)
RBC # BLD AUTO: 4.76 10E12/L (ref 3.8–5.2)
SPECIMEN EXP DATE BLD: NORMAL
WBC # BLD AUTO: 9.7 10E9/L (ref 4–11)

## 2019-08-26 PROCEDURE — 99207 ZZC FIRST OB VISIT: CPT | Performed by: OBSTETRICS & GYNECOLOGY

## 2019-08-26 PROCEDURE — 87591 N.GONORRHOEAE DNA AMP PROB: CPT | Performed by: OBSTETRICS & GYNECOLOGY

## 2019-08-26 PROCEDURE — 36415 COLL VENOUS BLD VENIPUNCTURE: CPT | Performed by: OBSTETRICS & GYNECOLOGY

## 2019-08-26 PROCEDURE — 86901 BLOOD TYPING SEROLOGIC RH(D): CPT | Performed by: OBSTETRICS & GYNECOLOGY

## 2019-08-26 PROCEDURE — 86762 RUBELLA ANTIBODY: CPT | Performed by: OBSTETRICS & GYNECOLOGY

## 2019-08-26 PROCEDURE — 85025 COMPLETE CBC W/AUTO DIFF WBC: CPT | Performed by: OBSTETRICS & GYNECOLOGY

## 2019-08-26 PROCEDURE — 87340 HEPATITIS B SURFACE AG IA: CPT | Performed by: OBSTETRICS & GYNECOLOGY

## 2019-08-26 PROCEDURE — 86780 TREPONEMA PALLIDUM: CPT | Performed by: OBSTETRICS & GYNECOLOGY

## 2019-08-26 PROCEDURE — 87389 HIV-1 AG W/HIV-1&-2 AB AG IA: CPT | Performed by: OBSTETRICS & GYNECOLOGY

## 2019-08-26 PROCEDURE — 87086 URINE CULTURE/COLONY COUNT: CPT | Performed by: OBSTETRICS & GYNECOLOGY

## 2019-08-26 PROCEDURE — 86900 BLOOD TYPING SEROLOGIC ABO: CPT | Performed by: OBSTETRICS & GYNECOLOGY

## 2019-08-26 PROCEDURE — 87491 CHLMYD TRACH DNA AMP PROBE: CPT | Performed by: OBSTETRICS & GYNECOLOGY

## 2019-08-26 PROCEDURE — 86850 RBC ANTIBODY SCREEN: CPT | Performed by: OBSTETRICS & GYNECOLOGY

## 2019-08-26 ASSESSMENT — MIFFLIN-ST. JEOR: SCORE: 1454.7

## 2019-08-26 NOTE — PROGRESS NOTES
HPI:    Nini is a 29 year old yo  at 7 0/7 weeks by LMP of 19 who presents today for her initial OB visit.  Last pap smear was in 2018 and it was normal.     Issues: Nausea    Past OB Hx: 1 , 1 SAB     Father of baby: No health issues       Past Medical History:   Diagnosis Date     Anemia     taking iron supplement     Blood type A+      Depression     age [age] - now in remission     Low iron stores     has taken iron in      Uncomplicated asthma     exercise induced asthma             Past Surgical History:   Procedure Laterality Date     INSERT INTRAUTERINE DEVICE  2017    needs to be removed 22     NO HISTORY OF SURGERY          Family History   Problem Relation Age of Onset     Thyroid Disease Mother      Gastrointestinal Disease Father         small bowel obstructions     Dementia Maternal Grandmother         vascular     Lung Cancer Paternal Grandmother      Pancreatic Cancer Paternal Grandfather 78        Social History     Socioeconomic History     Marital status:      Spouse name: Franklyn     Number of children: 1     Years of education: Not on file     Highest education level: Not on file   Occupational History     Occupation: social work     Comment: work at [employer]   Social Needs     Financial resource strain: Not on file     Food insecurity:     Worry: Not on file     Inability: Not on file     Transportation needs:     Medical: Not on file     Non-medical: Not on file   Tobacco Use     Smoking status: Never Smoker     Smokeless tobacco: Never Used   Substance and Sexual Activity     Alcohol use: Yes     Comment: occ-not in PG     Drug use: No     Sexual activity: Yes     Partners: Male   Lifestyle     Physical activity:     Days per week: Not on file     Minutes per session: Not on file     Stress: Not on file   Relationships     Social connections:     Talks on phone: Not on file     Gets together: Not on file     Attends Baptism service: Not on file      Active member of club or organization: Not on file     Attends meetings of clubs or organizations: Not on file     Relationship status: Not on file     Intimate partner violence:     Fear of current or ex partner: Not on file     Emotionally abused: Not on file     Physically abused: Not on file     Forced sexual activity: Not on file   Other Topics Concern     Parent/sibling w/ CABG, MI or angioplasty before 65F 55M? Not Asked   Social History Narrative     Not on file         Current Outpatient Medications:      Prenatal Vit-Fe Fumarate-FA (PRENATAL MULTIVITAMIN W/IRON) 27-0.8 MG tablet, Take 1 tablet by mouth daily, Disp: 90 tablet, Rfl: 3      Objective:  Physical Exam:   Breast:  Benign exam, no masses palpated.  No skin changes, no axillary lymphadenopathy, no nipple discharge.  Axilla feel completely normal, no lymph node enlargement and non-tender.  Heart=RRR, no murmurs  Thyroid=normal, no masses, no TTP  Lungs=Clear to ascultation bilateral, non-labored breathing  Abd=soft, Nontender/nondistended, +bowel sounds x4  PELVIC:    External genitalia: normal without lesion  Vagina: normal mucosa and rugae, no discharge.  Cervix: normal without lesion, healthy, multiparous, GC and Chlamydia obtained  Uterus: small, mobile, nontender.  Adnexa: non tender, without masses  Rectal: deffered  Ext=no clubbing or cyanosis  FHTs=unable to see by BSUS      Assessment:   1.  IUP at 7 0/7 weeks here for her initial OB visit    Plan:    1.  PNV  2.  NOB Labs and ultrasound   3.  Discussed routine prenatal care, quad screen, GCT, anatomy scan at ~19 weeks, frequency of visits.  4.  Discussed first trimester screen and she wants it done.    5.  [facility]  6.  RTC 4 weeks.      Discussed physician coverage, tertiary support, diet, exercise, weight gain, schedule of visits, routine and indicated ultrasounds, and childbirth education.    Options for  testing for chromosomal and birth defects were  discussed with the patient. Diagnostic tests include CVS and Amniocentesis. We discussed that these tests are definitive but invasive and do carry a risk of fetal loss.   Screening tests include nuchal translucency/blood marker testing in the first trimester and quad screening in the second trimester. We discussed that these are screening tests and not diagnostic tests and that false positives and negatives are a distinct possibility.     25 minutes was spent face to face with the patient today discussing her history, diagnosis, and follow-up plan as noted above.  Over 50% of the visit was spent in counseling and coordination of care.    Total Visit Time: 30 minutes      Cuca Staton DO

## 2019-08-26 NOTE — PATIENT INSTRUCTIONS
If you have any questions regarding your visit, Please contact your care team.    Women s Health CLINIC HOURS TELEPHONE NUMBER   Cuca Staton M.D.    Carey Mccormack -         Monday-[facility]  7:00 am - 5 pm Monday's Tuesday- [facility]  8:00am- 5 pm  Wednesday- Off  Thursday- Offf Friday-Am [place], and [facility]  [place] 8:00-11:30 AM  [place] 1:00-5:00 PM [facility]  [address] [place] [address]  [phone] ask for [facility]    Imaging [phone]    [facility]  [address] [place], [place] [address] [phone]  Imaging [phone]    [facility]  [address] [place], [place] [id number] [phone]  Imaging Scheduling - [phone]     Urgent Care locations:    [facility] Saturday and Sunday   9 am - 5 pm    Monday-Friday   12 pm - 8 pm  Saturday and Sunday   9 am - 5 pm   [phone]       If you need a medication refill, please contact your pharmacy. Please allow 3 business days for your refill to be completed.  As always, Thank you for trusting us with your healthcare needs!

## 2019-08-27 ENCOUNTER — ANCILLARY PROCEDURE (OUTPATIENT)
Dept: ULTRASOUND IMAGING | Facility: OTHER | Age: 29
End: 2019-08-27
Attending: OBSTETRICS & GYNECOLOGY
Payer: COMMERCIAL

## 2019-08-27 ENCOUNTER — TELEPHONE (OUTPATIENT)
Dept: OBGYN | Facility: CLINIC | Age: 29
End: 2019-08-27

## 2019-08-27 LAB
BACTERIA SPEC CULT: NO GROWTH
C TRACH DNA SPEC QL NAA+PROBE: NEGATIVE
HBV SURFACE AG SERPL QL IA: NONREACTIVE
HIV 1+2 AB+HIV1 P24 AG SERPL QL IA: NONREACTIVE
Lab: NORMAL
N GONORRHOEA DNA SPEC QL NAA+PROBE: NEGATIVE
RUBV IGG SERPL IA-ACNC: 21 IU/ML
SPECIMEN SOURCE: NORMAL
T PALLIDUM AB SER QL: NONREACTIVE

## 2019-08-27 PROCEDURE — 76801 OB US < 14 WKS SINGLE FETUS: CPT

## 2019-08-27 NOTE — TELEPHONE ENCOUNTER
[facility] Call Center    Phone Message    May a detailed message be left on voicemail: yes    Reason for Call: Other: Pt was returning call. Please advice     Action Taken: Message routed to:  [facility] p [phone]

## 2019-08-27 NOTE — TELEPHONE ENCOUNTER
M Needing to know if pt has had a dating ultrasound yet. Writer informed them she did today.       IMPRESSION: Early single live intrauterine pregnancy of approximately  7 weeks, 4 days gestation. Small subchorionic hemorrhage measuring 1.7  x 0.5 x 1.1 cm.       Notes recorded by Cuca Staton DO on 8/27/2019 at 12:56 PM CDT  Please call patient with her normal ultrasound results.  Let her know her due date remains at 4/13/20 based on her last menstrual period which is consistent with this ultrasound.  She has a small subchorionic hemorrhage. Thanks!    ~Cuca Staton DO    No further questions or concerns from [employer].     Codie Castaneda RN on 8/27/2019 at 3:21 PM

## 2019-08-27 NOTE — TELEPHONE ENCOUNTER
Per US results from today, 8/27/19:  Cuca Staton DO  [facility] Triage             Please call patient with her normal ultrasound results.  Let her know her due date remains at 4/13/20 based on her last menstrual period which is consistent with this ultrasound.  She has a small subchorionic hemorrhage. Thanks!     ~Cuca Staton DO      Relayed above results to pt.  Pt verbalized understanding.    Josefa Marvni RN

## 2019-09-23 ENCOUNTER — PRENATAL OFFICE VISIT (OUTPATIENT)
Dept: OBGYN | Facility: OTHER | Age: 29
End: 2019-09-23
Payer: COMMERCIAL

## 2019-09-23 VITALS
HEART RATE: 88 BPM | WEIGHT: 166.5 LBS | BODY MASS INDEX: 28.64 KG/M2 | DIASTOLIC BLOOD PRESSURE: 58 MMHG | SYSTOLIC BLOOD PRESSURE: 100 MMHG

## 2019-09-23 DIAGNOSIS — Z34.91 NORMAL PREGNANCY IN FIRST TRIMESTER: Primary | ICD-10-CM

## 2019-09-23 PROCEDURE — 99207 ZZC PRENATAL VISIT: CPT | Performed by: OBSTETRICS & GYNECOLOGY

## 2019-09-23 NOTE — PATIENT INSTRUCTIONS
If you have any questions regarding your visit, Please contact your care team.    Women s Health CLINIC HOURS TELEPHONE NUMBER   Cuca Staton M.D.    Carey Villalpando           Monday-[facility]  7:00 am - 5 pm Monday's Tuesday- [facility]  8:00am- 5 pm  Wednesday- Off  Thursday- Off Surgery  Friday-Am [place], and [facility]  [place] 8:00-11:30 AM  [place] 1:00-5:00 PM [facility]  [address] [place] [address]  [phone] ask for [facility]    Imaging [phone]    [facility]  [address] [place], [place] [id number] [phone]  Imaging [phone]    [facility]  [address] [place], [place] [id number] [phone]  Imaging Scheduling - [phone]     Urgent Care locations:    [facility] Saturday and Sunday   9 am - 5 pm    Monday-Friday   12 pm - 8 pm  Saturday and Sunday   9 am - 5 pm   [phone]       If you need a medication refill, please contact your pharmacy. Please allow 3 business days for your refill to be completed.  As always, Thank you for trusting us with your healthcare needs!

## 2019-09-23 NOTE — PROGRESS NOTES
29 year old  at 11w0d weeks presents to the clinic for a routine prenatal visit.  Nausea but tolerable.  No vaginal bleeding, leakage of fluid, or contractions   Fundal height=s=d  WTNq=254  RTC 4 weeks.    Cuca Staton DO

## 2019-10-29 ENCOUNTER — PRENATAL OFFICE VISIT (OUTPATIENT)
Dept: OBGYN | Facility: CLINIC | Age: 29
End: 2019-10-29
Payer: COMMERCIAL

## 2019-10-29 VITALS
WEIGHT: 166.5 LBS | DIASTOLIC BLOOD PRESSURE: 59 MMHG | SYSTOLIC BLOOD PRESSURE: 113 MMHG | OXYGEN SATURATION: 98 % | HEART RATE: 91 BPM | BODY MASS INDEX: 28.64 KG/M2

## 2019-10-29 DIAGNOSIS — Z23 NEED FOR PROPHYLACTIC VACCINATION AND INOCULATION AGAINST INFLUENZA: ICD-10-CM

## 2019-10-29 DIAGNOSIS — Z34.92 NORMAL PREGNANCY IN SECOND TRIMESTER: Primary | ICD-10-CM

## 2019-10-29 PROCEDURE — 90686 IIV4 VACC NO PRSV 0.5 ML IM: CPT | Performed by: OBSTETRICS & GYNECOLOGY

## 2019-10-29 PROCEDURE — 36415 COLL VENOUS BLD VENIPUNCTURE: CPT | Performed by: OBSTETRICS & GYNECOLOGY

## 2019-10-29 PROCEDURE — 90471 IMMUNIZATION ADMIN: CPT | Performed by: OBSTETRICS & GYNECOLOGY

## 2019-10-29 PROCEDURE — 99207 ZZC PRENATAL VISIT: CPT | Performed by: OBSTETRICS & GYNECOLOGY

## 2019-10-29 PROCEDURE — 99000 SPECIMEN HANDLING OFFICE-LAB: CPT | Performed by: OBSTETRICS & GYNECOLOGY

## 2019-10-29 PROCEDURE — 81511 FTL CGEN ABNOR FOUR ANAL: CPT | Mod: 90 | Performed by: OBSTETRICS & GYNECOLOGY

## 2019-10-29 NOTE — PROGRESS NOTES
29 year old  at 16w1d weeks presents to the clinic for a routine prenatal visit.  Feeling well.  No vaginal bleeding, leakage of fluid, or contractions   Fundal height=s=d  JVOx=118  Discussed quad screen and she wants that done.  Will do today.  Will schedule anatomy ultrasound.  RTC 4 weeks.    Cuca Staton, DO

## 2019-10-29 NOTE — PROGRESS NOTES
Influenza Vaccine IM > 6 months Valent IIV4     Date Status Dose VIS Date Route Site  Lot# Given By Verified By   10/29/2019 Given 0.5 mL 08/15/2019, Given Today IM LD San[employer] [id number] Sho Trujillo MA --   Exp. Date NDC # Product Time Location External Comment   2020 72171-437-13 FLUZONE QUADRIVALENT --  --    Question Answer Comment   Is the person to be vaccinated sick today? No    Does the person to be vaccinated have an allergy to a component of the vaccine? No    Has the person to be vaccinated ever had a serious reaction to influenza vaccine in the past? No    Has the person to be vaccinated ever had Guillain-Chariton syndrome? No    Verified patient's name and  prior to injection? Yes    Instructed patient to wait 15 min after administration? Yes    Updated by: Sho Trujillo MA on 10/29/2019  8:46 AM

## 2019-10-29 NOTE — PATIENT INSTRUCTIONS
If you have any questions regarding your visit, Please contact your care team.    Women s Health CLINIC HOURS TELEPHONE NUMBER   Cuca Staton M.D.    Carey Villalpando           Monday-[facility]  7:00 am - 5 pm Monday's Tuesday- [facility]  8:00am- 5 pm  Wednesday- Off  Thursday- Off Surgery  Friday-Am [place], and [facility]  [place] 8:00-11:30 AM  [place] 1:00-5:00 PM [facility]  [address] [place] [address]  [phone] ask for [facility]    Imaging [phone]    [facility]  [address] [place], [place] [id number] [phone]  Imaging [phone]    [facility]  [address] [place], [place] [id number] [phone]  Imaging Scheduling - [phone]     Urgent Care locations:    [facility] Saturday and Sunday   9 am - 5 pm    Monday-Friday   12 pm - 8 pm  Saturday and Sunday   9 am - 5 pm   [phone]       If you need a medication refill, please contact your pharmacy. Please allow 3 business days for your refill to be completed.  As always, Thank you for trusting us with your healthcare needs!

## 2019-10-31 LAB
# FETUSES US: NORMAL
# FETUSES: NORMAL
AFP ADJ MOM AMN: 0.55
AFP SERPL-MCNC: 17 NG/ML
AGE - REPORTED: 31.2 YR
CURRENT SMOKER: NO
CURRENT SMOKER: NO
DIABETES STATUS PATIENT: 166
FAMILY MEMBER DISEASES HX: NORMAL
FAMILY MEMBER DISEASES HX: NORMAL
GA METHOD: NORMAL
GA METHOD: NORMAL
GA: NORMAL WK
HCG MOM SERPL: 0.57
HCG SERPL-ACNC: NORMAL IU/L
HX OF HEREDITARY DISORDERS: NO
IDDM PATIENT QL: NORMAL
INHIBIN A MOM SERPL: 0.57
INHIBIN A SERPL-MCNC: 92 PG/ML
INTEGRATED SCN PATIENT-IMP: NORMAL
IVF PREGNANCY: NO
LMP START DATE: NORMAL
MONOCHORIONIC TWINS: NO
PATHOLOGY STUDY: NORMAL
PREV FETUS DEFECT: NO
SERVICE CMNT-IMP: NO
SPECIMEN DRAWN SERPL: NORMAL
U ESTRIOL MOM SERPL: 1.05
U ESTRIOL SERPL-MCNC: 1.04 NG/ML
VALPROIC/CARBAMAZEPINE STATUS: NO
WEIGHT UNITS: NO

## 2019-11-21 ENCOUNTER — ANCILLARY PROCEDURE (OUTPATIENT)
Dept: ULTRASOUND IMAGING | Facility: OTHER | Age: 29
End: 2019-11-21
Attending: OBSTETRICS & GYNECOLOGY
Payer: COMMERCIAL

## 2019-11-21 DIAGNOSIS — Z34.92 NORMAL PREGNANCY IN SECOND TRIMESTER: ICD-10-CM

## 2019-11-21 PROCEDURE — 76805 OB US >/= 14 WKS SNGL FETUS: CPT

## 2019-11-25 ENCOUNTER — PRENATAL OFFICE VISIT (OUTPATIENT)
Dept: OBGYN | Facility: OTHER | Age: 29
End: 2019-11-25
Payer: COMMERCIAL

## 2019-11-25 VITALS
SYSTOLIC BLOOD PRESSURE: 90 MMHG | HEART RATE: 78 BPM | DIASTOLIC BLOOD PRESSURE: 58 MMHG | WEIGHT: 166.5 LBS | BODY MASS INDEX: 28.64 KG/M2

## 2019-11-25 DIAGNOSIS — Z34.92 NORMAL PREGNANCY IN SECOND TRIMESTER: Primary | ICD-10-CM

## 2019-11-25 PROCEDURE — 99207 ZZC PRENATAL VISIT: CPT | Performed by: OBSTETRICS & GYNECOLOGY

## 2019-11-25 NOTE — PROGRESS NOTES
29 year old  at 20w0d weeks presents to the clinic for a routine prenatal visit.  No concerns.  No leakage of fluid, vaginal bleeding, or contractions   Good fetal movement.  FHTs: 130's  Fundal height: s=d  Normal anatomy ultrasound  RTC 4 weeks    Cuca Staton DO

## 2019-11-25 NOTE — PATIENT INSTRUCTIONS
What to watch out for are: regular contractions every 5 min, vaginal bleeding, decreased fetal movement, or leakage of fluid.  Please call the office or go to L&D if you develop any of these signs and symptoms.      I will see you for your follow up appointment.  Please feel free to call if you have any questions or concerns.      Thanks,  Cuca Staton, DO

## 2020-01-10 ENCOUNTER — PRENATAL OFFICE VISIT (OUTPATIENT)
Dept: OBGYN | Facility: OTHER | Age: 30
End: 2020-01-10
Payer: COMMERCIAL

## 2020-01-10 VITALS
SYSTOLIC BLOOD PRESSURE: 108 MMHG | BODY MASS INDEX: 29.67 KG/M2 | HEART RATE: 88 BPM | DIASTOLIC BLOOD PRESSURE: 62 MMHG | WEIGHT: 172.5 LBS

## 2020-01-10 DIAGNOSIS — Z34.92 NORMAL PREGNANCY IN SECOND TRIMESTER: Primary | ICD-10-CM

## 2020-01-10 LAB
GLUCOSE 1H P 50 G GLC PO SERPL-MCNC: 161 MG/DL (ref 60–129)
HGB BLD-MCNC: 10.2 G/DL (ref 11.7–15.7)

## 2020-01-10 PROCEDURE — 00000218 ZZHCL STATISTIC OBHBG - HEMOGLOBIN: Performed by: OBSTETRICS & GYNECOLOGY

## 2020-01-10 PROCEDURE — 36415 COLL VENOUS BLD VENIPUNCTURE: CPT | Performed by: OBSTETRICS & GYNECOLOGY

## 2020-01-10 PROCEDURE — 82950 GLUCOSE TEST: CPT | Performed by: OBSTETRICS & GYNECOLOGY

## 2020-01-10 PROCEDURE — 99207 ZZC PRENATAL VISIT: CPT | Performed by: OBSTETRICS & GYNECOLOGY

## 2020-01-10 NOTE — PROGRESS NOTES
29 year old  at 26w4d weeks presents to the clinic for a routine prenatal visit.  No concerns.  No vaginal bleeding, leakage of fluid, or contractions   Good fetal movement.  FHTs= 159  Fundal height=27cm    Normal anatomy ultrasound  RTC 4 weeks  GCT today  Blood type:A+    Cuca Staton DO

## 2020-01-11 DIAGNOSIS — R73.09 ABNORMAL GLUCOSE TOLERANCE TEST: Primary | ICD-10-CM

## 2020-01-11 DIAGNOSIS — D50.8 OTHER IRON DEFICIENCY ANEMIA: ICD-10-CM

## 2020-01-11 DIAGNOSIS — D64.9 ANEMIA, UNSPECIFIED TYPE: ICD-10-CM

## 2020-01-11 PROBLEM — D50.9 IRON DEFICIENCY ANEMIA: Status: ACTIVE | Noted: 2020-01-11

## 2020-01-11 RX ORDER — FERROUS SULFATE 325(65) MG
325 TABLET ORAL
Qty: 30 TABLET | Refills: 1 | Status: SHIPPED | OUTPATIENT
Start: 2020-01-11 | End: 2020-04-23

## 2020-01-17 DIAGNOSIS — R73.09 ABNORMAL GLUCOSE TOLERANCE TEST: ICD-10-CM

## 2020-01-17 LAB
GLUCOSE 1H P 100 G GLC PO SERPL-MCNC: 128 MG/DL (ref 60–179)
GLUCOSE 2H P 100 G GLC PO SERPL-MCNC: 99 MG/DL (ref 60–154)
GLUCOSE 3H P 100 G GLC PO SERPL-MCNC: 95 MG/DL (ref 60–139)
GLUCOSE P FAST SERPL-MCNC: 81 MG/DL (ref 60–94)

## 2020-01-17 PROCEDURE — 82951 GLUCOSE TOLERANCE TEST (GTT): CPT | Performed by: OBSTETRICS & GYNECOLOGY

## 2020-01-17 PROCEDURE — 82952 GTT-ADDED SAMPLES: CPT | Performed by: OBSTETRICS & GYNECOLOGY

## 2020-01-17 PROCEDURE — 36415 COLL VENOUS BLD VENIPUNCTURE: CPT | Performed by: OBSTETRICS & GYNECOLOGY

## 2020-02-03 ENCOUNTER — PRENATAL OFFICE VISIT (OUTPATIENT)
Dept: OBGYN | Facility: OTHER | Age: 30
End: 2020-02-03
Payer: COMMERCIAL

## 2020-02-03 VITALS
HEART RATE: 100 BPM | DIASTOLIC BLOOD PRESSURE: 58 MMHG | BODY MASS INDEX: 29.93 KG/M2 | SYSTOLIC BLOOD PRESSURE: 100 MMHG | WEIGHT: 174 LBS

## 2020-02-03 DIAGNOSIS — Z34.93 NORMAL PREGNANCY IN THIRD TRIMESTER: Primary | ICD-10-CM

## 2020-02-03 PROCEDURE — 90471 IMMUNIZATION ADMIN: CPT | Performed by: OBSTETRICS & GYNECOLOGY

## 2020-02-03 PROCEDURE — 90715 TDAP VACCINE 7 YRS/> IM: CPT | Performed by: OBSTETRICS & GYNECOLOGY

## 2020-02-03 PROCEDURE — 99207 ZZC PRENATAL VISIT: CPT | Performed by: OBSTETRICS & GYNECOLOGY

## 2020-02-03 NOTE — PROGRESS NOTES
29 year old  at 30w0d weeks presents to the clinic for a routine prenatal visit.  No concerns. Patient denies any vaginal bleeding, leakage of fluid, or contractions   Good fetal movement.    Fundal height=30cm  GWZs=440  GDS=failed one hour but passed three hour  Hgb=10.2.  Patient is taking additional iron   RTC 2 weeks    Cuca Staton DO

## 2020-02-03 NOTE — NURSING NOTE
Prior to immunization administration, verified patients identity using patient s name and date of birth. Please see Immunization Activity for additional information.     Screening Questionnaire for Adult Immunization    Are you sick today?   No   Do you have allergies to medications, food, a vaccine component or latex?   Yes   Have you ever had a serious reaction after receiving a vaccination?   No   Do you have a long-term health problem with heart, lung, kidney, or metabolic disease (e.g., diabetes), asthma, a blood disorder, no spleen, complement component deficiency, a cochlear implant, or a spinal fluid leak?  Are you on long-term aspirin therapy?   No   Do you have cancer, leukemia, HIV/AIDS, or any other immune system problem?   No   Do you have a parent, brother, or sister with an immune system problem?   No   In the past 3 months, have you taken medications that affect  your immune system, such as prednisone, other steroids, or anticancer drugs; drugs for the treatment of rheumatoid arthritis, Crohn s disease, or psoriasis; or have you had radiation treatments?   No   Have you had a seizure, or a brain or other nervous system problem?   No   During the past year, have you received a transfusion of blood or blood    products, or been given immune (gamma) globulin or antiviral drug?   No   For women: Are you pregnant or is there a chance you could become       pregnant during the next month?   Yes   Have you received any vaccinations in the past 4 weeks?   No     Immunization questionnaire was positive for at least one answer.  Notified Dr. Staton.        Per orders of Dr. Staton, injection of TDAP given by Sho Trujillo MA. Patient instructed to remain in clinic for 15 minutes afterwards, and to report any adverse reaction to me immediately.       Screening performed by Sho Trujillo MA on 2/3/2020 at 4:50 PM.

## 2020-02-03 NOTE — PROGRESS NOTES
TDAP Vaccine (Adacel)     Date Status Dose VIS Date Route Site  Lot# Given By Verified By   2/3/2020 Given 0.5 mL 02/24/2015, Given Today IM RD Sanof[employer] Pasteur [id number] Sho Trujillo MA --   Exp. Date NDC # Product Time Location External Comment   11/5/2021 48979-545-09 Adacel --  --    Updated by: Sho Trujillo MA on 2/3/2020  4:56 PM

## 2020-02-24 ENCOUNTER — PRENATAL OFFICE VISIT (OUTPATIENT)
Dept: OBGYN | Facility: OTHER | Age: 30
End: 2020-02-24
Payer: COMMERCIAL

## 2020-02-24 VITALS
SYSTOLIC BLOOD PRESSURE: 110 MMHG | BODY MASS INDEX: 30.23 KG/M2 | HEART RATE: 96 BPM | WEIGHT: 175.75 LBS | DIASTOLIC BLOOD PRESSURE: 56 MMHG

## 2020-02-24 DIAGNOSIS — Z34.93 NORMAL PREGNANCY IN THIRD TRIMESTER: Primary | ICD-10-CM

## 2020-02-24 PROCEDURE — 99207 ZZC PRENATAL VISIT: CPT | Performed by: OBSTETRICS & GYNECOLOGY

## 2020-02-24 NOTE — NURSING NOTE
"Chief Complaint   Patient presents with     Prenatal Care     33w0d       Initial /56 (BP Location: Left arm, Cuff Size: Adult Regular)   Pulse 96   Wt 79.7 kg (175 lb 12 oz)   LMP 2019   BMI 30.23 kg/m   Estimated body mass index is 30.23 kg/m  as calculated from the following:    Height as of 19: 1.624 m (5' 3.94\").    Weight as of this encounter: 79.7 kg (175 lb 12 oz).  BP completed using cuff size: regular          Dee Romeo MA on 2020 at 4:46 PM      "

## 2020-02-24 NOTE — PROGRESS NOTES
30 year old  at 33w0d weeks presents to the clinic for a routine prenatal visit.    No concerns.  Patient denies any vaginal bleeding, leakage of fluid, or contractions     Good fetal movement  Fundal height=33cm  KYDw=100's  Discussed labor precautions.  RTC 2 weeks    Cuca Staton DO

## 2020-03-02 ENCOUNTER — HEALTH MAINTENANCE LETTER (OUTPATIENT)
Age: 30
End: 2020-03-02

## 2020-03-09 ENCOUNTER — PRENATAL OFFICE VISIT (OUTPATIENT)
Dept: OBGYN | Facility: OTHER | Age: 30
End: 2020-03-09
Payer: COMMERCIAL

## 2020-03-09 VITALS
DIASTOLIC BLOOD PRESSURE: 58 MMHG | BODY MASS INDEX: 30.7 KG/M2 | HEART RATE: 92 BPM | SYSTOLIC BLOOD PRESSURE: 102 MMHG | WEIGHT: 178.5 LBS

## 2020-03-09 DIAGNOSIS — Z34.93 NORMAL PREGNANCY IN THIRD TRIMESTER: Primary | ICD-10-CM

## 2020-03-09 PROCEDURE — 99207 ZZC PRENATAL VISIT: CPT | Performed by: OBSTETRICS & GYNECOLOGY

## 2020-03-09 NOTE — NURSING NOTE
"Chief Complaint   Patient presents with     Prenatal Care     35w0d       Initial /58 (BP Location: Left arm, Cuff Size: Adult Regular)   Pulse 92   Wt 81 kg (178 lb 8 oz)   LMP 2019   BMI 30.70 kg/m   Estimated body mass index is 30.7 kg/m  as calculated from the following:    Height as of 19: 1.624 m (5' 3.94\").    Weight as of this encounter: 81 kg (178 lb 8 oz).  BP completed using cuff size: regular        Dee Romeo MA on 3/9/2020 at 4:09 PM    "

## 2020-03-09 NOTE — PROGRESS NOTES
30 year old  at 35w0d weeks presents to the clinic for a routine prenatal visit.    No concerns.  Patient denies any vaginal bleeding, leakage of fluid, or contractions     Good fetal movement  Fundal height=36cm  BIIe=340's  History of anemia=pt is taking additional iron  Discussed labor precautions.  RTC 1 weeks    Cuca Staton DO

## 2020-03-17 ENCOUNTER — PRENATAL OFFICE VISIT (OUTPATIENT)
Dept: OBGYN | Facility: CLINIC | Age: 30
End: 2020-03-17
Payer: COMMERCIAL

## 2020-03-17 ENCOUNTER — MYC MEDICAL ADVICE (OUTPATIENT)
Dept: OBGYN | Facility: OTHER | Age: 30
End: 2020-03-17

## 2020-03-17 VITALS
WEIGHT: 178.8 LBS | SYSTOLIC BLOOD PRESSURE: 121 MMHG | HEART RATE: 78 BPM | DIASTOLIC BLOOD PRESSURE: 65 MMHG | BODY MASS INDEX: 30.75 KG/M2

## 2020-03-17 DIAGNOSIS — Z34.93 NORMAL PREGNANCY IN THIRD TRIMESTER: Primary | ICD-10-CM

## 2020-03-17 PROCEDURE — 87653 STREP B DNA AMP PROBE: CPT | Performed by: OBSTETRICS & GYNECOLOGY

## 2020-03-17 PROCEDURE — 99207 ZZC PRENATAL VISIT: CPT | Performed by: OBSTETRICS & GYNECOLOGY

## 2020-03-17 PROCEDURE — 87186 SC STD MICRODIL/AGAR DIL: CPT | Performed by: OBSTETRICS & GYNECOLOGY

## 2020-03-17 NOTE — NURSING NOTE
"Chief Complaint   Patient presents with     Prenatal Care     36w1d       Initial /65 (BP Location: Right arm, Cuff Size: Adult Regular)   Pulse 78   Wt 81.1 kg (178 lb 12.8 oz)   LMP 2019   BMI 30.75 kg/m   Estimated body mass index is 30.75 kg/m  as calculated from the following:    Height as of 19: 1.624 m (5' 3.94\").    Weight as of this encounter: 81.1 kg (178 lb 12.8 oz).  BP completed using cuff size: regular        Dee Romeo MA on 3/17/2020 at 2:28 PM        "

## 2020-03-17 NOTE — PROGRESS NOTES
30 year old  at 36w1d weeks presents to the clinic for a routine prenatal visit.  No concerns.  No vaginal bleeding, leakage of fluid, or contractions  Fundal height=36cm  QJFa=801's  CX=Ft//-3  Breech by BSUS  GBS done today  Labor precautions discussed  RTC 1 week    Cuca Staton DO

## 2020-03-18 LAB
GP B STREP DNA SPEC QL NAA+PROBE: POSITIVE
SPECIMEN SOURCE: ABNORMAL

## 2020-03-19 ENCOUNTER — ANCILLARY PROCEDURE (OUTPATIENT)
Dept: ULTRASOUND IMAGING | Facility: OTHER | Age: 30
End: 2020-03-19
Attending: OBSTETRICS & GYNECOLOGY
Payer: COMMERCIAL

## 2020-03-19 DIAGNOSIS — Z34.93 NORMAL PREGNANCY IN THIRD TRIMESTER: ICD-10-CM

## 2020-03-19 PROBLEM — O99.820 GBS (GROUP B STREPTOCOCCUS CARRIER), +RV CULTURE, CURRENTLY PREGNANT: Status: ACTIVE | Noted: 2020-03-19

## 2020-03-19 PROCEDURE — 76819 FETAL BIOPHYS PROFIL W/O NST: CPT

## 2020-03-21 LAB
BACTERIA SPEC CULT: ABNORMAL
SPECIMEN SOURCE: ABNORMAL

## 2020-03-23 ENCOUNTER — PRENATAL OFFICE VISIT (OUTPATIENT)
Dept: OBGYN | Facility: OTHER | Age: 30
End: 2020-03-23
Payer: COMMERCIAL

## 2020-03-23 VITALS
SYSTOLIC BLOOD PRESSURE: 108 MMHG | BODY MASS INDEX: 30.87 KG/M2 | DIASTOLIC BLOOD PRESSURE: 52 MMHG | WEIGHT: 179.5 LBS | HEART RATE: 74 BPM

## 2020-03-23 DIAGNOSIS — Z34.93 NORMAL PREGNANCY IN THIRD TRIMESTER: Primary | ICD-10-CM

## 2020-03-23 DIAGNOSIS — O99.820 GBS (GROUP B STREPTOCOCCUS CARRIER), +RV CULTURE, CURRENTLY PREGNANT: ICD-10-CM

## 2020-03-23 PROCEDURE — 99207 ZZC PRENATAL VISIT: CPT | Performed by: OBSTETRICS & GYNECOLOGY

## 2020-03-23 NOTE — PROGRESS NOTES
30 year old  at 37w0d weeks presents to the clinic for a routine prenatal visit.  No concerns.  Complains of feeling more pressure.  No vaginal bleeding, leakage of fluid, or contractions   Fundal height=37cm  YSKh=496's  CX=Ft/Th/-2  Vertex by BSUS  Discussed labor precautions  RTC 1 week  GBS=Positive    Cuca Staton DO

## 2020-04-03 ENCOUNTER — PRENATAL OFFICE VISIT (OUTPATIENT)
Dept: OBGYN | Facility: CLINIC | Age: 30
End: 2020-04-03
Payer: COMMERCIAL

## 2020-04-03 VITALS
SYSTOLIC BLOOD PRESSURE: 118 MMHG | BODY MASS INDEX: 31.08 KG/M2 | WEIGHT: 180.7 LBS | DIASTOLIC BLOOD PRESSURE: 63 MMHG | HEART RATE: 84 BPM

## 2020-04-03 DIAGNOSIS — Z34.93 NORMAL PREGNANCY IN THIRD TRIMESTER: Primary | ICD-10-CM

## 2020-04-03 DIAGNOSIS — D50.8 OTHER IRON DEFICIENCY ANEMIA: ICD-10-CM

## 2020-04-03 DIAGNOSIS — O99.820 GBS (GROUP B STREPTOCOCCUS CARRIER), +RV CULTURE, CURRENTLY PREGNANT: ICD-10-CM

## 2020-04-03 PROCEDURE — 99207 ZZC PRENATAL VISIT: CPT | Performed by: OBSTETRICS & GYNECOLOGY

## 2020-04-03 NOTE — PROGRESS NOTES
30 year old  at 38w4d weeks presents to the clinic for a routine prenatal visit.  No concerns.  Complains of feeling more pressure.  No vaginal bleeding, leakage of fluid, or contractions   Fundal height=37cm  SOXw=258's  CX=1/50/-2  Vertex by BSUS  Discussed labor precautions  RTC 1 week  GBS=Positive    Cuca Staton DO

## 2020-04-06 ENCOUNTER — PRENATAL OFFICE VISIT (OUTPATIENT)
Dept: OBGYN | Facility: CLINIC | Age: 30
End: 2020-04-06
Payer: COMMERCIAL

## 2020-04-06 VITALS
BODY MASS INDEX: 31.39 KG/M2 | DIASTOLIC BLOOD PRESSURE: 63 MMHG | WEIGHT: 182.5 LBS | OXYGEN SATURATION: 98 % | SYSTOLIC BLOOD PRESSURE: 109 MMHG | HEART RATE: 77 BPM

## 2020-04-06 DIAGNOSIS — O99.820 GBS (GROUP B STREPTOCOCCUS CARRIER), +RV CULTURE, CURRENTLY PREGNANT: ICD-10-CM

## 2020-04-06 DIAGNOSIS — Z34.93 NORMAL PREGNANCY IN THIRD TRIMESTER: Primary | ICD-10-CM

## 2020-04-06 DIAGNOSIS — Z23 NEED FOR TDAP VACCINATION: ICD-10-CM

## 2020-04-06 PROCEDURE — 99207 ZZC PRENATAL VISIT: CPT | Performed by: OBSTETRICS & GYNECOLOGY

## 2020-04-06 NOTE — PROGRESS NOTES
30 year old  at 39w0d weeks presents to the clinic for a routine prenatal visit.  No concerns.  Complains of feeling more pressure.  No vaginal bleeding, leakage of fluid, or contractions   Fundal height=38cm  GCRa=846  CX=2-3/80/-2  Discussed labor precautions  RTC 1 week    Discussed with Nini the risks, benefits and alternatives to induction.  We discussed cervical ripening for those patients with a stanley score of less than 6 (for multiparous) and 8 (for nulliparous).  Discussed the concerns related to uterine hyperstimulation and adverse fetal effects that can occur with a ripening agent or pitocin. Discussed amniotomy and the rationale for it with induction.  I discussed the expected timeline for her based on her presentation, but she understands that this is just an approximate.  She is given the opportunity to ask questions and have them answered.  She does wish to proceed    Induction scheduled for .      Cuca Staton, DO

## 2020-04-23 ENCOUNTER — E-VISIT (OUTPATIENT)
Dept: OBGYN | Facility: CLINIC | Age: 30
End: 2020-04-23
Payer: COMMERCIAL

## 2020-04-23 DIAGNOSIS — N61.0 MASTITIS: Primary | ICD-10-CM

## 2020-04-23 PROCEDURE — 99421 OL DIG E/M SVC 5-10 MIN: CPT | Mod: 24 | Performed by: OBSTETRICS & GYNECOLOGY

## 2020-04-23 RX ORDER — DICLOXACILLIN SODIUM 500 MG
500 CAPSULE ORAL 4 TIMES DAILY
Qty: 28 CAPSULE | Refills: 0 | Status: SHIPPED | OUTPATIENT
Start: 2020-04-23 | End: 2020-05-29

## 2020-04-23 NOTE — PATIENT INSTRUCTIONS
Hi,    I am covering for Dr. Staton while she is out of the office.  I have sent in a prescription to the [facility].  Please let me know if your fever does not improve with tylenol or if your symptoms do not improve in general with the antibiotic.      Thanks,  Dr. Thao

## 2020-05-29 ENCOUNTER — PRENATAL OFFICE VISIT (OUTPATIENT)
Dept: OBGYN | Facility: CLINIC | Age: 30
End: 2020-05-29
Payer: COMMERCIAL

## 2020-05-29 VITALS
DIASTOLIC BLOOD PRESSURE: 71 MMHG | HEART RATE: 77 BPM | WEIGHT: 157.9 LBS | OXYGEN SATURATION: 95 % | SYSTOLIC BLOOD PRESSURE: 117 MMHG | BODY MASS INDEX: 27.16 KG/M2

## 2020-05-29 PROBLEM — D50.9 IRON DEFICIENCY ANEMIA: Status: RESOLVED | Noted: 2020-01-11 | Resolved: 2020-05-29

## 2020-05-29 PROBLEM — O99.820 GBS (GROUP B STREPTOCOCCUS CARRIER), +RV CULTURE, CURRENTLY PREGNANT: Status: RESOLVED | Noted: 2020-03-19 | Resolved: 2020-05-29

## 2020-05-29 PROBLEM — Z23 NEED FOR TDAP VACCINATION: Status: RESOLVED | Noted: 2019-08-26 | Resolved: 2020-05-29

## 2020-05-29 PROBLEM — Z34.93 NORMAL PREGNANCY IN THIRD TRIMESTER: Status: RESOLVED | Noted: 2019-08-26 | Resolved: 2020-05-29

## 2020-05-29 PROBLEM — R79.89 LOW SERUM SODIUM: Status: RESOLVED | Noted: 2017-02-27 | Resolved: 2020-05-29

## 2020-05-29 PROCEDURE — 99207 ZZC POST PARTUM EXAM: CPT | Performed by: OBSTETRICS & GYNECOLOGY

## 2020-05-29 ASSESSMENT — PATIENT HEALTH QUESTIONNAIRE - PHQ9
SUM OF ALL RESPONSES TO PHQ QUESTIONS 1-9: 2
5. POOR APPETITE OR OVEREATING: NOT AT ALL

## 2020-05-29 ASSESSMENT — ANXIETY QUESTIONNAIRES
6. BECOMING EASILY ANNOYED OR IRRITABLE: NOT AT ALL
3. WORRYING TOO MUCH ABOUT DIFFERENT THINGS: NOT AT ALL
5. BEING SO RESTLESS THAT IT IS HARD TO SIT STILL: NOT AT ALL
IF YOU CHECKED OFF ANY PROBLEMS ON THIS QUESTIONNAIRE, HOW DIFFICULT HAVE THESE PROBLEMS MADE IT FOR YOU TO DO YOUR WORK, TAKE CARE OF THINGS AT HOME, OR GET ALONG WITH OTHER PEOPLE: NOT DIFFICULT AT ALL
2. NOT BEING ABLE TO STOP OR CONTROL WORRYING: NOT AT ALL
GAD7 TOTAL SCORE: 0
1. FEELING NERVOUS, ANXIOUS, OR ON EDGE: NOT AT ALL
7. FEELING AFRAID AS IF SOMETHING AWFUL MIGHT HAPPEN: NOT AT ALL

## 2020-05-29 NOTE — PROGRESS NOTES
Subjective  30 year old non-pregnant female presents today for a postpartum visit.  Patient had a  on 2020.  No pain.  No vaginal bleeding.  No problems urinating.  Normal bowel movements.  Patient is pumping.  No signs and symptoms of ppd.  Patient scored a 2 on the PHQ-9 and a 0 on the GARCIA-7.  No thoughts of suicide or infanticide.  Patient is not due for a pap smear today.  Patient is wanting to do the Nexplanon for birth control.        ROS: 10 point ROS neg other than the symptoms noted above in the HPI.  Past Medical History:   Diagnosis Date     Anemia     taking iron supplement     Blood type A+      Depression     age 18-20 - now in remission     Low iron stores     has taken iron in      Uncomplicated asthma     exercise induced asthma     Past Surgical History:   Procedure Laterality Date     INSERT INTRAUTERINE DEVICE  2017    needs to be removed 22     NO HISTORY OF SURGERY       Family History   Problem Relation Age of Onset     Thyroid Disease Mother      Gastrointestinal Disease Father         small bowel obstructions     Prostate Cancer Father      Dementia Maternal Grandmother         vascular     Lung Cancer Paternal Grandmother      Pancreatic Cancer Paternal Grandfather 78     Social History     Tobacco Use     Smoking status: Never Smoker     Smokeless tobacco: Never Used   Substance Use Topics     Alcohol use: Yes     Comment: occ-not in PG         Objective  Vitals: /71   Pulse 77   Wt 71.6 kg (157 lb 14.4 oz)   LMP 2019   SpO2 95%   BMI 27.16 kg/m    BMI= Body mass index is 27.16 kg/m .           Assessment  1.)  Post partum visit  2.)  S/p  on 2020  3.)  Birth control        Plan  1.)  Schedule Nexplanon insertion      Cuca Staton

## 2020-05-29 NOTE — PATIENT INSTRUCTIONS
Please call if you any questions.    [facility]  [address] [place]   [address]  [phone]        Cuca Staton,

## 2020-05-30 ASSESSMENT — ANXIETY QUESTIONNAIRES: GAD7 TOTAL SCORE: 0

## 2020-06-19 ENCOUNTER — OFFICE VISIT (OUTPATIENT)
Dept: OBGYN | Facility: CLINIC | Age: 30
End: 2020-06-19
Payer: COMMERCIAL

## 2020-06-19 DIAGNOSIS — Z01.818 PRE-PROCEDURAL EXAMINATION: Primary | ICD-10-CM

## 2020-06-19 DIAGNOSIS — Z30.017 NEXPLANON INSERTION: Primary | ICD-10-CM

## 2020-06-19 PROBLEM — Z97.5 NEXPLANON IN PLACE: Status: ACTIVE | Noted: 2020-06-19

## 2020-06-19 LAB — HCG UR QL: NEGATIVE

## 2020-06-19 PROCEDURE — 81025 URINE PREGNANCY TEST: CPT | Performed by: OBSTETRICS & GYNECOLOGY

## 2020-06-19 PROCEDURE — 11981 INSERTION DRUG DLVR IMPLANT: CPT | Performed by: OBSTETRICS & GYNECOLOGY

## 2020-06-19 NOTE — PROGRESS NOTES
Subjective  30 year old non-pregnant female presents today for a Nexplanon insertion.  Patient has no questions regarding this.  She has not had unprotected intercourse.        ROS: 10 point ROS neg other than the symptoms noted above in the HPI.  Past Medical History:   Diagnosis Date     Anemia     taking iron supplement     Blood type A+      Depression     age [age] - now in remission     Low iron stores     has taken iron in 2014     Uncomplicated asthma     exercise induced asthma     Past Surgical History:   Procedure Laterality Date     INSERT INTRAUTERINE DEVICE  12/18/2017    needs to be removed 12/18/22     NO HISTORY OF SURGERY       Family History   Problem Relation Age of Onset     Thyroid Disease Mother      Gastrointestinal Disease Father         small bowel obstructions     Prostate Cancer Father      Dementia Maternal Grandmother         vascular     Lung Cancer Paternal Grandmother      Pancreatic Cancer Paternal Grandfather 78     Social History     Tobacco Use     Smoking status: Never Smoker     Smokeless tobacco: Never Used   Substance Use Topics     Alcohol use: Yes     Comment: occ-not in PG         Objective  Vitals: There were no vitals taken for this visit.  BMI= There is no height or weight on file to calculate BMI.    General appearance=well developed, well-nourished female  Gait=normal  Psych=mood is stable, alert and oriented x3      Procedure Note:     Placement of Nexplanon for birth control.  Pregnancy test=negative  Nexplanon lot#=[id number]  Exp 8/28/2022 NDC#=4435-1106-31    The patient is positioned with her left arm flexed at the elbow.  A point ~6 cm from the epicondyle is marked and another point 6 cm caudal to this is marked on the inner arm.  This area is cleansed with betadine and then injected with 1% lidocaine with epi.  The Nexplanon device is opened and it is confirmed that the bhupendra is in the device.  A small stab incision is made at the point closer to the elbow.  The  Nexplanon device is then placed just under the skin with care not to go too deep.  The device is then slowly removed, leaving the bhupendra behind.  The bhupendra is palpable in the appropriate place under the skin.  The incision is noted to be hemostatic, steri strips and bandaid applied, and the area is wrapped with a 4x4 and tape.      There were no complications and minimal blood loss.      Assessment  1.) Birth control counseling      Plan  1.)  Nexplanon insertion        Nursing notes read and reviewed    Cuca Staton DO

## 2020-10-09 ENCOUNTER — MEDICAL CORRESPONDENCE (OUTPATIENT)
Dept: HEALTH INFORMATION MANAGEMENT | Facility: CLINIC | Age: 30
End: 2020-10-09

## 2020-12-20 ENCOUNTER — HEALTH MAINTENANCE LETTER (OUTPATIENT)
Age: 30
End: 2020-12-20

## 2021-07-03 ASSESSMENT — ENCOUNTER SYMPTOMS
CONSTIPATION: 0
SORE THROAT: 0
HEMATURIA: 0
DIARRHEA: 0
EYE PAIN: 0
ARTHRALGIAS: 0
HEADACHES: 0
WEAKNESS: 0
FREQUENCY: 0
PARESTHESIAS: 0
NAUSEA: 0
DYSURIA: 0
CHILLS: 0
COUGH: 0
DIZZINESS: 0
HEMATOCHEZIA: 0
FEVER: 0
ABDOMINAL PAIN: 0
NERVOUS/ANXIOUS: 0
JOINT SWELLING: 0
MYALGIAS: 0
HEARTBURN: 0
SHORTNESS OF BREATH: 0
PALPITATIONS: 0
BREAST MASS: 0

## 2021-07-06 ENCOUNTER — OFFICE VISIT (OUTPATIENT)
Dept: FAMILY MEDICINE | Facility: CLINIC | Age: 31
End: 2021-07-06
Payer: COMMERCIAL

## 2021-07-06 VITALS
BODY MASS INDEX: 27.83 KG/M2 | WEIGHT: 163 LBS | OXYGEN SATURATION: 99 % | DIASTOLIC BLOOD PRESSURE: 66 MMHG | SYSTOLIC BLOOD PRESSURE: 104 MMHG | HEART RATE: 80 BPM | RESPIRATION RATE: 12 BRPM | TEMPERATURE: 98.6 F | HEIGHT: 64 IN

## 2021-07-06 DIAGNOSIS — Z12.4 SCREENING FOR MALIGNANT NEOPLASM OF CERVIX: ICD-10-CM

## 2021-07-06 DIAGNOSIS — Z00.00 ROUTINE GENERAL MEDICAL EXAMINATION AT A HEALTH CARE FACILITY: Primary | ICD-10-CM

## 2021-07-06 DIAGNOSIS — Z80.3 FAMILY HISTORY OF MALIGNANT NEOPLASM OF BREAST: ICD-10-CM

## 2021-07-06 DIAGNOSIS — K64.4 EXTERNAL HEMORRHOIDS: ICD-10-CM

## 2021-07-06 DIAGNOSIS — Z11.59 NEED FOR HEPATITIS C SCREENING TEST: ICD-10-CM

## 2021-07-06 LAB — HGB BLD-MCNC: 12.5 G/DL (ref 11.7–15.7)

## 2021-07-06 PROCEDURE — 36415 COLL VENOUS BLD VENIPUNCTURE: CPT | Performed by: NURSE PRACTITIONER

## 2021-07-06 PROCEDURE — 87624 HPV HI-RISK TYP POOLED RSLT: CPT | Performed by: NURSE PRACTITIONER

## 2021-07-06 PROCEDURE — 84443 ASSAY THYROID STIM HORMONE: CPT | Performed by: NURSE PRACTITIONER

## 2021-07-06 PROCEDURE — 82947 ASSAY GLUCOSE BLOOD QUANT: CPT | Performed by: NURSE PRACTITIONER

## 2021-07-06 PROCEDURE — 99395 PREV VISIT EST AGE 18-39: CPT | Performed by: NURSE PRACTITIONER

## 2021-07-06 PROCEDURE — G0145 SCR C/V CYTO,THINLAYER,RESCR: HCPCS | Performed by: NURSE PRACTITIONER

## 2021-07-06 PROCEDURE — 85018 HEMOGLOBIN: CPT | Performed by: NURSE PRACTITIONER

## 2021-07-06 PROCEDURE — 80061 LIPID PANEL: CPT | Performed by: NURSE PRACTITIONER

## 2021-07-06 PROCEDURE — 86803 HEPATITIS C AB TEST: CPT | Performed by: NURSE PRACTITIONER

## 2021-07-06 PROCEDURE — 99213 OFFICE O/P EST LOW 20 MIN: CPT | Mod: 25 | Performed by: NURSE PRACTITIONER

## 2021-07-06 ASSESSMENT — ENCOUNTER SYMPTOMS
NERVOUS/ANXIOUS: 0
HEMATOCHEZIA: 0
CONSTIPATION: 0
PALPITATIONS: 0
EYE PAIN: 0
FEVER: 0
DYSURIA: 0
JOINT SWELLING: 0
ABDOMINAL PAIN: 0
ARTHRALGIAS: 0
HEADACHES: 0
PARESTHESIAS: 0
HEMATURIA: 0
MYALGIAS: 0
SORE THROAT: 0
NAUSEA: 0
FREQUENCY: 0
COUGH: 0
SHORTNESS OF BREATH: 0
WEAKNESS: 0
DIARRHEA: 0
HEARTBURN: 0
DIZZINESS: 0
CHILLS: 0
BREAST MASS: 0

## 2021-07-06 ASSESSMENT — PAIN SCALES - GENERAL: PAINLEVEL: NO PAIN (0)

## 2021-07-06 ASSESSMENT — MIFFLIN-ST. JEOR: SCORE: 1427.74

## 2021-07-06 NOTE — PROGRESS NOTES
SUBJECTIVE:   CC: Nini Wang is an 31 year old woman who presents for preventive health visit.       Patient has been advised of split billing requirements and indicates understanding: Yes  Healthy Habits:     Getting at least 3 servings of Calcium per day:  Yes    Bi-annual eye exam:  Yes    Dental care twice a year:  Yes    Sleep apnea or symptoms of sleep apnea:  None    Diet:  Regular (no restrictions)    Frequency of exercise:  4-5 days/week    Duration of exercise:  30-45 minutes    Taking medications regularly:  Not Applicable    Medication side effects:  Not applicable    PHQ-2 Total Score: 0    Additional concerns today:  Yes    Questions about hemorrhoids. havign rectal itching. Occ. Red on toilet paper. Daily normal BM.     Pt. Works as  every 3rd weekend. 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 ,  is Franklyn Lopez.           Today's PHQ-2 Score:   PHQ-2 ( 1999 [employer]) 7/3/2021   Q1: Little interest or pleasure in doing things 0   Q2: Feeling down, depressed or hopeless 0   PHQ-2 Score 0   Q1: Little interest or pleasure in doing things Not at all   Q2: Feeling down, depressed or hopeless Not at all   PHQ-2 Score 0       Abuse: Current or Past (Physical, Sexual or Emotional) - No  Do you feel safe in your environment? Yes    Have you ever done Advance Care Planning? (For example, a Health Directive, POLST, or a discussion with a medical provider or your loved ones about your wishes): No, advance care planning information given to patient to review.  Patient declined advance care planning discussion at this time.    Social History     Tobacco Use     Smoking status: Never Smoker     Smokeless tobacco: Never Used   Substance Use Topics     Alcohol use: Yes     Comment: occ-not in PG         Alcohol Use 7/3/2021   Prescreen: >3 drinks/day or >7 drinks/week? No   Prescreen: >3 drinks/day or >7 drinks/week? -       Reviewed orders with patient.  Reviewed health maintenance and  updated orders accordingly - Yes      Breast Cancer Screening:    FHS-7:   Breast CA Risk Assessment (FHS-7) 7/3/2021   Did any of your first-degree relatives have breast or ovarian cancer? Yes   Did any of your relatives have bilateral breast cancer? Unknown   Did any man in your family have breast cancer? No   Did any woman in your family have breast and ovarian cancer? Yes   Did any woman in your family have breast cancer before age 50 y? Yes   Do you have 2 or more relatives with breast and/or ovarian cancer? Yes   Do you have 2 or more relatives with breast and/or bowel cancer? Yes   - Grandmother and maternal aunt, no HX of genetic breast cancer.       Patient under 40 years of age: Routine Mammogram Screening not recommended.   Pertinent mammograms are reviewed under the imaging tab.    History of abnormal Pap smear:   NO - age 30-65 PAP every 5 years with negative HPV co-testing recommended  Last 3 Pap Results:   PAP (no units)   Date Value   11/07/2018 NIL   07/22/2015 NIL     Last 3 Pap and HPV Results:   PAP / HPV 11/7/2018 7/22/2015   PAP NIL NIL     PAP / HPV 11/7/2018 7/22/2015   PAP NIL NIL     Reviewed and updated as needed this visit by clinical staff  Tobacco  Allergies  Meds   Med Hx  Surg Hx  Fam Hx  Soc Hx        Reviewed and updated as needed this visit by Provider                    Review of Systems   Constitutional: Negative for chills and fever.   HENT: Negative for congestion, ear pain, hearing loss and sore throat.    Eyes: Negative for pain and visual disturbance.   Respiratory: Negative for cough and shortness of breath.    Cardiovascular: Negative for chest pain, palpitations and peripheral edema.   Gastrointestinal: Negative for abdominal pain, constipation, diarrhea, heartburn, hematochezia and nausea.   Breasts:  Negative for tenderness, breast mass and discharge.   Genitourinary: Negative for dysuria, frequency, genital sores, hematuria, pelvic pain, urgency, vaginal bleeding  "and vaginal discharge.   Musculoskeletal: Negative for arthralgias, joint swelling and myalgias.   Skin: Negative for rash.   Neurological: Negative for dizziness, weakness, headaches and paresthesias.   Psychiatric/Behavioral: Negative for mood changes. The patient is not nervous/anxious.      Irregular bleeding on Nexplanon.   Stopped breast feeding a ways back and periods returned.      OBJECTIVE:   /66   Pulse 80   Temp 98.6  F (37  C) (Temporal)   Resp 12   Ht 1.615 m (5' 3.58\")   Wt 73.9 kg (163 lb)   LMP 06/28/2021 (Approximate)   SpO2 99%   BMI 28.35 kg/m    Physical Exam  GENERAL: healthy, alert and no distress  EYES: Eyes grossly normal to inspection, PERRL and conjunctivae and sclerae normal  HENT: ear canals and TM's normal, nose and mouth without ulcers or lesions  NECK: no adenopathy, no asymmetry, masses, or scars and thyroid normal to palpation  RESP: lungs clear to auscultation - no rales, rhonchi or wheezes  BREAST: normal without masses, tenderness or nipple discharge and no palpable axillary masses or adenopathy  CV: regular rate and rhythm, normal S1 S2, no S3 or S4, no murmur, click or rub, no peripheral edema and peripheral pulses strong  ABDOMEN: soft, nontender, no hepatosplenomegaly, no masses and bowel sounds normal   (female): normal female external genitalia, normal urethral meatus, vaginal mucosa pink, moist, well rugated, and normal cervix/adnexa/uterus without masses or discharge  RECTAL: small quiet hemorrhoids and mild tinea corporis on gluteal cleft. No fissures noted.   MS: no gross musculoskeletal defects noted, no edema  SKIN: no suspicious lesions or rashes  NEURO: Normal strength and tone, mentation intact and speech normal  PSYCH: mentation appears normal, affect normal/bright    Diagnostic Test Results:  Labs reviewed in Epic  Results for orders placed or performed in visit on 07/06/21 (from the past 24 hour(s))   TSH with free T4 reflex   Result Value Ref " "Range    TSH 1.59 0.40 - 4.00 mU/L   Glucose   Result Value Ref Range    Glucose 86 70 - 99 mg/dL   Hemoglobin   Result Value Ref Range    Hemoglobin 12.5 11.7 - 15.7 g/dL   Lipid panel reflex to direct LDL Non-fasting   Result Value Ref Range    Cholesterol 135 <200 mg/dL    Triglycerides 60 <150 mg/dL    HDL Cholesterol 46 (L) >49 mg/dL    LDL Cholesterol Calculated 77 <100 mg/dL    Non HDL Cholesterol 89 <130 mg/dL       ASSESSMENT/PLAN:       ICD-10-CM    1. Routine general medical examination at a health care facility  Z00.00 TSH with free T4 reflex     Glucose     Hemoglobin     Lipid panel reflex to direct LDL Non-fasting   2. Need for hepatitis C screening test  Z11.59 Hepatitis C Screen Reflex to HCV RNA Quant and Genotype   3. Screening for malignant neoplasm of cervix  Z12.4 Pap imaged thin layer screen with HPV - recommended age 30 - 65 years (select HPV order below)     HPV High Risk Types DNA Cervical   4. External hemorrhoids  K64.4        Patient has been advised of split billing requirements and indicates understanding: Yes  COUNSELING:  Reviewed preventive health counseling, as reflected in patient instructions       Regular exercise       Healthy diet/nutrition       Immunizations    UTD             Contraception       Colon cancer screening       Advance Care Planning       - declined infor.   Hemorrhoid cares, OTC hydrocortisone, and anti-fungal. If not improving, can refer to colon/rectal. Stool cares, water intake, exercise encouraged.     No first degree relatives with breast cancer, can consider genetic referral- placed.     Estimated body mass index is 28.35 kg/m  as calculated from the following:    Height as of this encounter: 1.615 m (5' 3.58\").    Weight as of this encounter: 73.9 kg (163 lb).    Weight management plan: Discussed healthy diet and exercise guidelines    She reports that she has never smoked. She has never used smokeless tobacco.      Counseling Resources:  ATP IV " Guidelines  Pooled Cohorts Equation Calculator  Breast Cancer Risk Calculator  BRCA-Related Cancer Risk Assessment: FHS-7 Tool  FRAX Risk Assessment  ICSI Preventive Guidelines  Dietary Guidelines for Americans, 2010  USDA's MyPlate  ASA Prophylaxis  Lung CA Screening    ESPERANZA Bonilla CNP  [facility]

## 2021-07-07 LAB
CHOLEST SERPL-MCNC: 135 MG/DL
GLUCOSE SERPL-MCNC: 86 MG/DL (ref 70–99)
HCV AB SERPL QL IA: NONREACTIVE
HDLC SERPL-MCNC: 46 MG/DL
LDLC SERPL CALC-MCNC: 77 MG/DL
NONHDLC SERPL-MCNC: 89 MG/DL
TRIGL SERPL-MCNC: 60 MG/DL
TSH SERPL DL<=0.005 MIU/L-ACNC: 1.59 MU/L (ref 0.4–4)

## 2021-07-07 NOTE — RESULT ENCOUNTER NOTE
Azalea,  Your labs that have returned are normal. More labs are still processing. Annie Byrnes, DNP

## 2021-07-09 LAB
COPATH REPORT: NORMAL
PAP: NORMAL

## 2021-07-12 LAB
FINAL DIAGNOSIS: NORMAL
HPV HR 12 DNA CVX QL NAA+PROBE: NEGATIVE
HPV16 DNA SPEC QL NAA+PROBE: NEGATIVE
HPV18 DNA SPEC QL NAA+PROBE: NEGATIVE
SPECIMEN DESCRIPTION: NORMAL
SPECIMEN SOURCE CVX/VAG CYTO: NORMAL

## 2021-10-03 ENCOUNTER — HEALTH MAINTENANCE LETTER (OUTPATIENT)
Age: 31
End: 2021-10-03

## 2021-10-04 ENCOUNTER — OFFICE VISIT (OUTPATIENT)
Dept: FAMILY MEDICINE | Facility: CLINIC | Age: 31
End: 2021-10-04
Payer: COMMERCIAL

## 2021-10-04 VITALS
DIASTOLIC BLOOD PRESSURE: 82 MMHG | RESPIRATION RATE: 16 BRPM | SYSTOLIC BLOOD PRESSURE: 130 MMHG | OXYGEN SATURATION: 100 % | WEIGHT: 168.1 LBS | BODY MASS INDEX: 28.7 KG/M2 | HEIGHT: 64 IN | HEART RATE: 71 BPM | TEMPERATURE: 98.3 F

## 2021-10-04 DIAGNOSIS — Z23 NEED FOR PROPHYLACTIC VACCINATION AND INOCULATION AGAINST INFLUENZA: ICD-10-CM

## 2021-10-04 DIAGNOSIS — Z30.46 NEXPLANON REMOVAL: Primary | ICD-10-CM

## 2021-10-04 PROCEDURE — 90686 IIV4 VACC NO PRSV 0.5 ML IM: CPT | Performed by: NURSE PRACTITIONER

## 2021-10-04 PROCEDURE — 90471 IMMUNIZATION ADMIN: CPT | Performed by: NURSE PRACTITIONER

## 2021-10-04 PROCEDURE — 11982 REMOVE DRUG IMPLANT DEVICE: CPT | Performed by: NURSE PRACTITIONER

## 2021-10-04 PROCEDURE — 99207 PR DROP WITH A PROCEDURE: CPT | Performed by: NURSE PRACTITIONER

## 2021-10-04 ASSESSMENT — MIFFLIN-ST. JEOR: SCORE: 1450.88

## 2021-10-04 NOTE — PROGRESS NOTES
"Assessment & Plan      Need for prophylactic vaccination and inoculation against influenza  Flu vaccine given today.     Nexplanon removal  Discussed need for alternate plan for contraception prior to 's vasectomy, she may have bruising at the site of incision and down arm, and signs that would indicate infection. Educated to leave pressure dressing on for 24 hours and steri-strips for 3-5 days.  Wound cares and monitoring for signs of infection reviewed.   Return to clinic with any new or worsening symptoms, and as needed.        See Patient Instructions  Follow-up in 9 months for PE.       Seen and examined by Araceli Harvey RN, DNP student. Exam and note,and procedure supervised verified by  ESPERANZA Bonilla CNP  [facility] Wang is a 31 year old female who presents to clinic today for the following health issues     Nexplanon removal  History of Present Illness       She eats 4 or more servings of fruits and vegetables daily.She consumes 1 sweetened beverage(s) daily.She exercises with enough effort to increase her heart rate 30 to 60 minutes per day.  She exercises with enough effort to increase her heart rate 6 days per week.   She is taking medications regularly.     She is here today to have her nexplanon removed. She is having side effects of spotting, weight gain, and acne. In addition, her  has a planned vasectomy scheduled for contraception.          Review of Systems   CONSTITUTIONAL: NEGATIVE for fever, chills, change in weight  RESP: NEGATIVE for significant cough or SOB  CV: NEGATIVE for chest pain, palpitations or peripheral edema  : spotting      Objective    /82   Pulse 71   Temp 98.3  F (36.8  C) (Oral)   Resp 16   Ht 1.615 m (5' 3.58\")   Wt 76.2 kg (168 lb 1.6 oz)   LMP 09/27/2021 (Approximate)   SpO2 100%   BMI 29.23 kg/m    Body mass index is 29.23 kg/m .  Physical Exam   GENERAL: healthy, alert and no " distress  MS; no gross abnormalities.   Neuro: alert, oriented X 3  PSYCH: mentation appears normal, affect normal/bright    Procedural Note  Procedure Name: Progestin Implant Removal  Indications for Removal: Side Effects  Location/Side: Left arm  Informed consent: Procedure, risks, and benefits explained to the patient and informed consent was obtained before the procedure started.   Patient placed in supine position. Aspetic conditions maintained. Device located by palpation and marked. Area cleaned with iodine and alcohol. 2.5cc of 1% lidocaine with epinephrine injected underneath underneath implant at distal end. 3mm incision was made with a 11 blade scalpel.  Connective tissue was resected, and device pushed to incision opening and grasped with forceps and gently removed. The Nexplanon device was removed in its entirety without incident. Steri-strips applied to incision and pressure dressing applied.Patient tolerated procedure well.

## 2022-06-22 ENCOUNTER — MYC MEDICAL ADVICE (OUTPATIENT)
Dept: FAMILY MEDICINE | Facility: CLINIC | Age: 32
End: 2022-06-22

## 2022-06-22 NOTE — TELEPHONE ENCOUNTER
Spoke with patient. Bleeding and clots for 3-4 days started 06/08/2022.  Did take another home preg test 2 days ago and positive. She is wanting to verify if she is pregnant or if she had a miscarriage and she just needs time.    OB check appointment made.    John Price, [place], RN, PHN  [facility] ~ Registered Nurse  Clinic Triage ~ [place] [facility] [place]  June 22, 2022

## 2022-06-23 ENCOUNTER — TELEPHONE (OUTPATIENT)
Dept: OBGYN | Facility: CLINIC | Age: 32
End: 2022-06-23

## 2022-06-23 ENCOUNTER — LAB (OUTPATIENT)
Dept: LAB | Facility: CLINIC | Age: 32
End: 2022-06-23
Payer: COMMERCIAL

## 2022-06-23 ENCOUNTER — MYC MEDICAL ADVICE (OUTPATIENT)
Dept: OBGYN | Facility: OTHER | Age: 32
End: 2022-06-23

## 2022-06-23 ENCOUNTER — VIRTUAL VISIT (OUTPATIENT)
Dept: OBGYN | Facility: OTHER | Age: 32
End: 2022-06-23
Payer: COMMERCIAL

## 2022-06-23 DIAGNOSIS — O20.9 VAGINAL BLEEDING AFFECTING EARLY PREGNANCY: Primary | ICD-10-CM

## 2022-06-23 DIAGNOSIS — O20.9 VAGINAL BLEEDING AFFECTING EARLY PREGNANCY: ICD-10-CM

## 2022-06-23 PROBLEM — Z97.5 NEXPLANON IN PLACE: Status: RESOLVED | Noted: 2020-06-19 | Resolved: 2022-06-23

## 2022-06-23 LAB
ABO/RH(D): NORMAL
ANTIBODY SCREEN: NEGATIVE
B-HCG SERPL-ACNC: ABNORMAL IU/L (ref 0–5)
ERYTHROCYTE [DISTWIDTH] IN BLOOD BY AUTOMATED COUNT: 13.9 % (ref 10–15)
HCT VFR BLD AUTO: 35.9 % (ref 35–47)
HGB BLD-MCNC: 11.5 G/DL (ref 11.7–15.7)
MCH RBC QN AUTO: 24.3 PG (ref 26.5–33)
MCHC RBC AUTO-ENTMCNC: 32 G/DL (ref 31.5–36.5)
MCV RBC AUTO: 76 FL (ref 78–100)
PLATELET # BLD AUTO: 355 10E3/UL (ref 150–450)
RBC # BLD AUTO: 4.73 10E6/UL (ref 3.8–5.2)
SPECIMEN EXPIRATION DATE: NORMAL
WBC # BLD AUTO: 11.7 10E3/UL (ref 4–11)

## 2022-06-23 PROCEDURE — 99213 OFFICE O/P EST LOW 20 MIN: CPT | Mod: 95 | Performed by: OBSTETRICS & GYNECOLOGY

## 2022-06-23 PROCEDURE — 84702 CHORIONIC GONADOTROPIN TEST: CPT

## 2022-06-23 PROCEDURE — 85027 COMPLETE CBC AUTOMATED: CPT

## 2022-06-23 PROCEDURE — 86850 RBC ANTIBODY SCREEN: CPT

## 2022-06-23 PROCEDURE — 36415 COLL VENOUS BLD VENIPUNCTURE: CPT

## 2022-06-23 PROCEDURE — 86900 BLOOD TYPING SEROLOGIC ABO: CPT

## 2022-06-23 PROCEDURE — 86901 BLOOD TYPING SEROLOGIC RH(D): CPT

## 2022-06-23 NOTE — PROGRESS NOTES
Azalea is a 32 year old who is being evaluated via a billable telephone visit.      What phone number would you like to be contacted at? [phone]  How would you like to obtain your AVS? Amber        OB/GYN      NAME:  Nini Wang  PCP:  Annie Byrnes  MRN:  [id number]    Impression / Plan     32 year old  with:      ICD-10-CM    1. Vaginal bleeding affecting early pregnancy  O20.9 HCG quantitative pregnancy     ABO/Rh type and screen     CBC with platelets       If pregnancy hormone level is below threshold, plan repeat quant in 48 hours.  If it is elevated, plan US.  Also consider US if she has heavy or prolonged bleeding, severe pain.    Her  will contact his provider to get the SA done.     Chief Complaint     Chief Complaint   Patient presents with     Consult     Bleeding in early pregnancy        HPI     Nini Wang is a  32 year old female who is seen for miscarriage concerns via telephone visit.   Had bleeding 4-5 days last week.  Assumes she had a miscarriage.      Continues to have some pregnancy symptoms.  Continues to have chest tenderness.  Some nausea and food aversions.    No pelvic pain aside from 1/10 pain on her right side for the last two days.    Some bloating.    Covid at the beginning .  Mild cold symptoms.  103 temp for one day.     Patient's last menstrual period was 2022.     Vasectomy 9 months ago, but did not have follow up SA for confirmation.    Problem List     There are no problems to display for this patient.      Medications     No current outpatient medications on file.     No current facility-administered medications for this visit.        Allergies     Allergies   Allergen Reactions     Prednisone      unsure       ROS     Pertinent positives and negatives are listed in the HPI.     Physical Exam   Vitals: LMP 2022   Breastfeeding No     Telephone visit.         Dee Thao MD     15 min spent on the date of the encounter  in chart review, patient visit, review of tests, documentation about the issues documented above.  Telephone visit time = 10 min

## 2022-06-23 NOTE — TELEPHONE ENCOUNTER
Called patient to discuss her upcoming appointments.  Left message asking her to return our call.      When she calls back, please let the patient know that the nurse unfortunately scheduled her where there are already two patients (one overbook, so she would do the second overbook in one spot).    I am able to talk with her over the phone regarding her concerns. Please see if she is free earlier today for a phone visit.  Otherwise we can try to do a phone visit tomorrow.      RN - please provide standard bleeding precautions, when to go to the ED for heavy bleeding.

## 2022-06-23 NOTE — TELEPHONE ENCOUNTER
Pt responded to the [place] message that Dr. Thao sent to her stating that she is free to take Dr. Thao's call at any time today.    Routed the [place] encounter to Dr. Thao so she is aware and can call pt back when it works in her schedule.    Josefa Marvin RN

## 2022-06-24 ENCOUNTER — NURSE TRIAGE (OUTPATIENT)
Dept: NURSING | Facility: CLINIC | Age: 32
End: 2022-06-24
Payer: COMMERCIAL

## 2022-06-24 ENCOUNTER — TELEPHONE (OUTPATIENT)
Dept: OBGYN | Facility: OTHER | Age: 32
End: 2022-06-24

## 2022-06-24 DIAGNOSIS — R10.9 ABDOMINAL PAIN, UNSPECIFIED ABDOMINAL LOCATION: ICD-10-CM

## 2022-06-24 DIAGNOSIS — Z33.1 PREGNANCY, INCIDENTAL: Primary | ICD-10-CM

## 2022-06-24 NOTE — TELEPHONE ENCOUNTER
"Per 6/23 VV note:  \"If pregnancy hormone level is below threshold, plan repeat quant in 48 hours.  If it is elevated, plan US.  Also consider US if she has heavy or prolonged bleeding, severe pain.\"    Dr. Thao has not had a chance to review and interpret pt's labs from yesterday.    Pt is writing in concerned that she may have an ectopic as she is experiencing nausea/vomiting, increasing right sided pain (constant 4-5 out of 10 pain) and light headedness.    Routing to Dr. Thao and provider pool to sign US order if appropriate.    Josefa Marvin RN        "

## 2022-06-24 NOTE — TELEPHONE ENCOUNTER
Called patient to discuss prelim US.      Patient has had some pain on her right side.   No bleeding.  Some nausea.      US shows IUP with no fetal heart rate, no yolk sac.  Images reviewed personally. No adnexal masses.  Possible left corpus luteum. No free fluid.    Quant is 36,767, so we would expect to see cardiac activity at this point.  She is 7w 3d by LMP and GS is 8 weeks.  Echogenic mass within GS does not appear to be a normal CRL.        Discussed findings and suspicion for missed AB.  Plan quant again tomorrow. It is reasonable to diagnose her with a missed AB and move forward with treatment.  Patient is in agreement.  Briefly discussed D&C.  Discussed medical management in more detail. Plan mife and misoprostol Monday.  Instructions given.  Further instructions and precautions will be given at her visit.      Dee Thao MD

## 2022-06-24 NOTE — TELEPHONE ENCOUNTER
bloodwork level done, HGB levels high, had Abdominal pain, dizziness and vomiting. Looking for US order per her.

## 2022-06-24 NOTE — TELEPHONE ENCOUNTER
Triage Call:     Is this a 2nd Level Triage? YES, LICENSED PRACTITIONER REVIEW IS REQUIRED  Routed to provider    Pt calling today to see if she can have an ultrasound to check to see if she is having an ectopic pregnancy    She reports that she has messaged her care team yesterday and has not heard back    Writer decided to triage patient's sx to give her advisement    Pt reports the following:     Pt had a positive pregnancy test at the beginning of June  Then 2 weeks ago she had heavy bleeding and clotting  Blood work was completed recently for HCG    Pt is currently having the following sx:     Right sided pain rated 4-5/10  Nausea  Vomiting  Lightheadedness    She has no bleeding present today    Disposition: 2ND LEVEL TRIAGE. ED/UCC now or to office with PCP approval. Writer is routing this message to patient's PCP and care team for a 2nd level opinion to let patient know where she should be seen today with her sx. Not all [facility] have US capabilities.     Ida Jackson RN  [facility] Nurse Advisor 10:06 AM 6/24/2022      Does the patient meet one of the following criteria for ADS visit consideration? 16+ years old, with an [facility] PCP     TIP  Providers, please consider if this condition is appropriate for management at one of our Acute and Diagnostic Services sites.     If patient is a good candidate, please use dotphrase <dot>triageresponse and select Refer to ADS to document.      Reason for Disposition    MILD constant abdominal pain (e.g., doesn't interfere with normal activities) and present > 2 hours    Lightheadedness or dizziness    Additional Information    Negative: Passed out (i.e., fainted, collapsed and was not responding)    Negative: Shock suspected (e.g., cold/pale/clammy skin, too weak to stand, low BP, rapid pulse)    Negative: Difficult to awaken or acting confused (e.g., disoriented, slurred speech)    Negative: Sounds like a life-threatening emergency to the triager    Negative: Abdominal  pain and pregnant > 20 weeks    Negative: MODERATE-SEVERE abdominal pain    Negative: SEVERE vaginal bleeding (e.g., soaking 2 pads per hour, large blood clots) and present 2 or more hours    Negative: MODERATE vaginal bleeding (e.g., soaking 1 pad per hour; clots) and present > 6 hours    Negative: MODERATE vaginal bleeding and pregnant > 12 weeks    Negative: Passed tissue (e.g., gray-white)    Negative: Vomiting and contains red blood or black ('coffee ground') material    Negative: Shoulder pain    Negative: Intermittent lower abdominal pain lasting > 24 hours    Negative: Vaginal bleeding or spotting    Protocols used: PREGNANCY - ABDOMINAL PAIN LESS THAN 20 WEEKS EGA-A-OH

## 2022-06-24 NOTE — TELEPHONE ENCOUNTER
Referral to ADS placed, they will call her.   Report given to staff and Dr. Ocampo. ESPERANZA Bonilla CNP   Provider Response to 2nd Level Triage Request    I have reviewed the RN documentation. My recommendation is:  Refer to Acute and Diagnostic Services (ADS) clinic.     Referral to Acute and Diagnostic Services          Day of Diagnosis services are indicated.  The [facility] Acute and Diagnostics Services Clinic has been contacted at [phone] ([place]) to confirm patient acceptance.     The transition to Acute & Diagnostic Services Clinic has been discussed with patient, and she agrees with next level of care.  Patient understands that evaluation/treatment at [facility]S typically takes significantly longer than in clinic/urgent care (>2 hours).    ADS Referral placed: Yes  Patient has transportation arranged and will travel to the ADS without delay: per ADS    Patient aware not to eat or drink. No        Special issues:  None

## 2022-06-25 ENCOUNTER — MYC MEDICAL ADVICE (OUTPATIENT)
Dept: OBGYN | Facility: OTHER | Age: 32
End: 2022-06-25

## 2022-06-26 ENCOUNTER — TELEPHONE (OUTPATIENT)
Dept: OBGYN | Facility: OTHER | Age: 32
End: 2022-06-26

## 2022-06-26 ENCOUNTER — TELEPHONE (OUTPATIENT)
Dept: NURSING | Facility: CLINIC | Age: 32
End: 2022-06-26

## 2022-06-26 ENCOUNTER — LAB (OUTPATIENT)
Dept: LAB | Facility: CLINIC | Age: 32
End: 2022-06-26
Payer: COMMERCIAL

## 2022-06-26 DIAGNOSIS — O20.9 VAGINAL BLEEDING AFFECTING EARLY PREGNANCY: Primary | ICD-10-CM

## 2022-06-26 DIAGNOSIS — O20.9 VAGINAL BLEEDING AFFECTING EARLY PREGNANCY: ICD-10-CM

## 2022-06-26 DIAGNOSIS — R10.9 ABDOMINAL PAIN, UNSPECIFIED ABDOMINAL LOCATION: ICD-10-CM

## 2022-06-26 PROCEDURE — 36415 COLL VENOUS BLD VENIPUNCTURE: CPT

## 2022-06-26 PROCEDURE — 84702 CHORIONIC GONADOTROPIN TEST: CPT

## 2022-06-26 NOTE — TELEPHONE ENCOUNTER
Patient requests orders for HCG testing.  Came in yesterday to lab but does not have an order.  Plans to do it today at [facility] lab    FNA paged Dr. Thao and she gave verbal orders for HCG testing.      FNA called [facility]  lab and Leilani (lab staff) states that pt can come in anytime before 4 pm for lab draw.    Azalea notified and she verbalized understanding. She leaves work at 3:30 PM and will try to make it on time for lab draw.    Katie Pierson RN/[place] Nurse Advisor

## 2022-06-26 NOTE — TELEPHONE ENCOUNTER
Verbal orders received from Dr. Thao. HCG serum quantitative test.    Katie Pierson RN/[place] Nurse Advisor

## 2022-06-27 ENCOUNTER — OFFICE VISIT (OUTPATIENT)
Dept: OBGYN | Facility: CLINIC | Age: 32
End: 2022-06-27
Payer: COMMERCIAL

## 2022-06-27 VITALS
SYSTOLIC BLOOD PRESSURE: 108 MMHG | WEIGHT: 167.9 LBS | BODY MASS INDEX: 29.2 KG/M2 | DIASTOLIC BLOOD PRESSURE: 60 MMHG | HEART RATE: 78 BPM

## 2022-06-27 DIAGNOSIS — O02.1 MISSED ABORTION: Primary | ICD-10-CM

## 2022-06-27 LAB — B-HCG SERPL-ACNC: ABNORMAL IU/L (ref 0–5)

## 2022-06-27 PROCEDURE — 99214 OFFICE O/P EST MOD 30 MIN: CPT | Performed by: OBSTETRICS & GYNECOLOGY

## 2022-06-27 RX ORDER — LOPERAMIDE HYDROCHLORIDE 2 MG/1
2 TABLET ORAL 4 TIMES DAILY PRN
Qty: 10 TABLET | Refills: 0 | Status: SHIPPED | OUTPATIENT
Start: 2022-06-27 | End: 2022-08-03

## 2022-06-27 RX ORDER — IBUPROFEN 800 MG/1
800 TABLET, FILM COATED ORAL EVERY 8 HOURS PRN
Qty: 21 TABLET | Refills: 0 | Status: SHIPPED | OUTPATIENT
Start: 2022-06-27 | End: 2022-08-03

## 2022-06-27 RX ORDER — MIFEPRISTONE 200 MG/1
200 TABLET ORAL ONCE
Status: COMPLETED | OUTPATIENT
Start: 2022-06-27 | End: 2022-06-27

## 2022-06-27 RX ORDER — HYDROCODONE BITARTRATE AND ACETAMINOPHEN 5; 325 MG/1; MG/1
1 TABLET ORAL EVERY 6 HOURS PRN
Qty: 10 TABLET | Refills: 0 | Status: SHIPPED | OUTPATIENT
Start: 2022-06-27 | End: 2022-08-03

## 2022-06-27 RX ORDER — MISOPROSTOL 200 UG/1
800 TABLET ORAL ONCE
Qty: 8 TABLET | Refills: 0 | Status: SHIPPED | OUTPATIENT
Start: 2022-06-27 | End: 2022-06-27

## 2022-06-27 RX ORDER — ONDANSETRON 4 MG/1
4 TABLET, FILM COATED ORAL EVERY 6 HOURS PRN
Qty: 10 TABLET | Refills: 0 | Status: SHIPPED | OUTPATIENT
Start: 2022-06-27 | End: 2022-08-03

## 2022-06-27 RX ADMIN — MIFEPRISTONE 200 MG: 200 TABLET ORAL at 11:43

## 2022-06-27 NOTE — PATIENT INSTRUCTIONS
If you have any questions regarding your visit, Please contact your care team.    [place]: [phone]      Women s Health CLINIC HOURS TELEPHONE NUMBER   Nini Mckeon DO.    KAROLINE Powell -Surgery Scheduler  Veronica - Surgery Scheduler    TAVIA Rivero, TAVIA El RN     Monday, Thursday  [place]  7am-3pm    Tuesday, Wednesday  [place]  7am-3pm    E/O Friday &   [place]    Typical Surgery Days: Thursday or Friday   [facility]  [address] [place] [address] [phone] Phone  [phone] Fax    [facility]  [address] [place] [address] [phone] Phone    Imaging [phone] Phone    [facility] Labor and Delivery:  [phone] Phone     **Surgeries** Our Surgery Schedulers will contact you to schedule. If you do not receive a call within 3 business days, please call [phone].    Urgent Care locations:  [facility] Saturday and    9 am - 5 pm    Monday-Friday   12 pm - 8 pm  Saturday and    9 am - 5 pm   [phone]       If you need a medication refill, please contact your pharmacy. Please allow 3 business days for your refill to be completed.  As always, Thank you for trusting us with your healthcare needs!    MISCARRIAGE MANAGEMENT USING MEDICATIONS     HOW DOES IT WORK?   Pills called mifepristone and misoprostol can help an early pregnancy loss to end faster.     HOW WELL DOES THE MEDICATION WORK?   These pills work 84% of the time to complete the miscarriage within two days and 89% of the time within a week. If you can t get mifepristone, misoprostol works 67% of the time within two days and 84% of the time within a week when used alone.     IS IT SAFE?   Yes, pills for pregnancy loss are safe. Using pills does not lower your chance  of getting pregnant again.     WHAT DO I DO?   You took one tablet of 200 mg mifepristone today during your visit.  About 24 hours after taking the mifepristone, use the misoprostol at home. Choose a time when you have had a good meal and plenty of rest.   Swallow ibuprofen (600 mg total) one hour before using the misoprostol. This will help with side effects.   Vaginal Use of Misoprostol  Wash your hands and lie down. Push the four misoprostol tablets one at a time up into the vagina as far as you can using your finger. It doesn t matter where in the vagina you put the pills. Each misoprostol pill contains 200 micrograms.   Keep lying down for 30 minutes.   After 30 minutes, you can move around as usual. If the tablets come out after 30 minutes, it is OK. Your body has absorbed the medication.        Buccal Use of Misoprostol  Place 2 tablets of misoprostol in each cheek (between your cheek and your gums)  Let these tablets dissolve, whatever has not dissolved within 30 minutes can be swallowed with a large glass of water  If you do not start to bleed within 24 hours give yourself a second dose of misoprostol. Do this by repeating step 4 or 5 above.     WHAT HAPPENS NEXT?  Cramps and bleeding with clots are an expected part of this process. The cramps and bleeding may be stronger than your normal period. The cramps usually start 2 to 4 hours after you use the misoprostol pills and may last 3 to 5 hours. You may start to have vaginal bleeding before you use the pills I have given you. This heavy bleeding is not risky. It means the pills are working. The bleeding often lasts 1 to 2 weeks, and may stop and start a few times.   Nausea, diarrhea, or chills: These symptoms should get better a few hours after using the pills and should not last longer than 24 hours.   You need to have an appointment in the clinic in 1-2 weeks. This was made for you today. At this visit we will ensure that the treatment was effective. If  it was not effective, we will talk about next steps.        WHAT CAN I TAKE FOR PAIN, NAUSEA, DIARRHEA?   Pain:   Take ibuprofen (Motrin or Advil) 800 mg every 8 hours as needed for pain. Take this with food to avoid an upset stomach.   You may receive a stronger, narcotic pain medicine. You can use this if you still have pain after taking ibuprofen. Follow the directions on the label.   Comfort: A hot pack may help with cramps. You can also drink some hot tea. Rest in a soothing place.   Nausea  Take ondansetron or promethazine as needed for nausea and vomiting as needed for nausea and vomiting.   Diarrhea  Take Loperamide as needed for diarrhea. Take 2 capsules after the first loose bowel movement. Can take 1 capsule after each additional loose stool. Do not take more than 8 capsules in a day.    WHAT IF IT TAKES TOO LONG OR DOESN T WORK?   If it doesn t work or you feel it is taking too long, please call the clinic and we can discuss the next steps.    WHEN SHOULD I CALL OR RETURN TO MY HEALTH CARE PROVIDER?   Your bleeding soaks through more than 2 maxi pads per hour for 2 hours in a row.  You have a fever on an oral thermometer of 100.4 degrees Fahrenheit   You have severe stomach area (abdominal) pain   You continue to feel sick 24 hours after taking the misoprostol, this includes stomach pain or discomfort, weakness, nausea, vomiting, or diarrhea    The clinic phone is answered 24 hours per day. If it is after clinic hours, leave a message with the answering service and a physician will call you back. Please call if you have any questions, think something is going wrong, or think you have an emergency. No question is too small. If you do not receive a call back within an hour, go to the nearest emergency room.     HOW SOON CAN I GET PREGNANT AGAIN?   You can get pregnant soon after the pregnancy loss ends. If you want to try again, take a prenatal vitamin daily. If you are not ready to be pregnant at this  time, please see your clinician for your birth control options.     FEELINGS AFTER EARLY PREGNANCY LOSS   Most women feel mixed emotions after a miscarriage. It often depends upon how much time you had to adjust to the news of the pregnancy loss. These up and down feelings are partly from the changes in hormones. Please understand that nothing you did caused the pregnancy loss. It is NOT caused by stress, sports, food, or sex. Feeling emotional at this time is normal. If you think your emotions are not what they should be, please talk to us.

## 2022-06-27 NOTE — PROGRESS NOTES
Confirmed Early Pregnancy Loss - Outpatient Visit    Subjective:  Nini Wang is a 32 year old female coming today for management of early pregnancy loss. She has noticed the following:  - nausea  - lower abdominal pain - mild right side pain  - vaginal spotting/bleeding - resolved 2 weeks ago (clotting, vaginal bleeding)  Patient diagnosed with early pregnancy loss at the following time/location: : HCG 13910;  99020.     IMPRESSION: Intrauterine gestational sac with unusual appearing  lobulated mass. No yolk sac or normal-appearing fetal pole is seen.  Differential diagnosis includes gestational trophoblastic disease.  Recommend short-term follow-up ultrasound.     YEN SOLER MD       ROS: All other review of symptoms was otherwise negative except as noted in HPI    Dating:  LMP: Patient's last menstrual period was 2022.  Ultrasound results:   vaginal, abdominal  Intrauterine gestational sac seen: yes  Gestational sac summary: unusual appearing  lobulated mass. No yolk sac or normal-appearing fetal pole is seen  Fetal/Embryonic cardiac activity: absent  Adnexa: no masses seen        Serum beta hCG: increased inappropriately, which IS congruent with early pregnancy loss.   Hemoglobin: 11.5  RH Status (if >8wks GA): Positive    OB Hx:  OB History    Para Term  AB Living   3 2 2 0 1 2   SAB IAB Ectopic Multiple Live Births   1 0 0 0 2      # Outcome Date GA Lbr José Miguel/2nd Weight Sex Delivery Anes PTL Lv   3 Term 20 39w1d 06:56 / 00:12 3.37 kg (7 lb 6.9 oz) M Vag-Spont EPI N KYLEE      Name: ALEX,BABY BOY      Apgar1: 8  Apgar5: 9   2 SAB 19 9w4d          1 Term 17 40w6d 12:36 / 04:30 4.07 kg (8 lb 15.6 oz) M Vag-Spont EPI N KYLEE      Complications: Chorioamnionitis      Name: Oz      Apgar1: 4  Apgar5: 9       Objective:  /60   Pulse 78   Wt 76.2 kg (167 lb 14.4 oz)   LMP 2022   BMI 29.20 kg/m    Vitals were reviewed and were  normal    Gen: well-appearing, cooperative, well-groomed  Abd: Soft    Assessment and Plan:  First-trimester pregnancy loss.    We reviewed the patient's options at length including the risks and benefits or expectant management, medical management using misoprostol alone, mifepristone and misoprostol, and aspiration procedure with local anesthesia and suction evacuation. Patient IS vitally stable and IS appropriate to manage as an outpatient.    The patient has elected and is appropriate for medical management with misoprostol with mifepristone.      Medication Management  - confirmed patient is within gestational age limit (11w6d or less)  - obtain serum hCG (if applicable)  - obtain hemoglobin and type and screen (if patent is >8wk GA and if not already known, or if not performed within the window of onset of bleeding or diagnosis of EPL)  - reviewed expected and serious side effects from mifepristone  - reviewed how to take misoprostol and expected side effects  - reviewed that mifepristone/misoprostol may not be effective and patient may require surgical management of miscarriage.   - confirmed and addressed any drug-drug interactions with patient's concurrent medicines, including vitamins and herbal supplements  - provided prescription of ibuprofen for cramping, loperamide for diarrhea and a medicine for nausea  - RHOGAM administered (if Rh negative and >8 wks GA)  - call or return to clinic in 1-2 weeks if no bleeding or obvious passage of POC's has occurred  - chart routed to RN team who will follow up with patient via telephone in 1-2 days (or next business day) after clinic visit to assess patient   - 2nd loss, questionable imaging regarding molar pregnancy risk. Discussed bringing POC in for pathology after passing. Repeating HCG levels and pelvic US in 1 week. All questions answered     Nini Mckeon DO

## 2022-06-27 NOTE — PROGRESS NOTES
Clinic Administered Medication Documentation    Administrations This Visit     miFEPRIStone (MIFEPREX) tablet 200 mg     Admin Date  06/27/2022 Action  Given Dose  200 mg Route  Oral Site   Administered By  Macie Barillas MA    Ordering Provider: Nini Mckeon DO    NDC: 97962-424-55    Lot#: [id number]    : [employer]    Patient Supplied?: No                Oral Medication Documentation    Patient was given Mifepristone. Prior to medication administration, verified patients identity using patient s name and date of birth. Please see MAR and medication order for additional information.     Was entire amount of medication used? Yes  Expiration Date: 11/2024

## 2022-06-27 NOTE — Clinical Note
Please follow-up with patient in 1-2 days regarding symptoms, bleeding after taking medication per instructions in note.  Nini Mckeon,

## 2022-06-28 ENCOUNTER — TELEPHONE (OUTPATIENT)
Dept: OBGYN | Facility: CLINIC | Age: 32
End: 2022-06-28

## 2022-06-28 NOTE — TELEPHONE ENCOUNTER
"Nini Mckeon, DO  [facility] Ob/Gyn Triage  \"Please follow-up with patient in 1-2 days regarding symptoms, bleeding after taking medication per instructions in note.\"    Routing to the triage pool as a reminder to reach out to pt in 1-2 days to see how she is doing.    Josefa Marvin RN    "

## 2022-06-29 NOTE — TELEPHONE ENCOUNTER
Closing encounter as pt responded to the [employer] message sent to her.  Please see 6/29 Enbase encounter for further communication.    Josefa Marvin RN

## 2022-06-29 NOTE — TELEPHONE ENCOUNTER
Pt was seen in office on 6/27 for a missed AB and was given mifepristone.  Reached out to pt via phone to see how she was doing today.  Left message to call clinic back.  Will also send pt a [employer] message.    Josefa Marvin RN

## 2022-07-05 ENCOUNTER — ANCILLARY PROCEDURE (OUTPATIENT)
Dept: ULTRASOUND IMAGING | Facility: CLINIC | Age: 32
End: 2022-07-05
Attending: OBSTETRICS & GYNECOLOGY
Payer: COMMERCIAL

## 2022-07-05 ENCOUNTER — LAB (OUTPATIENT)
Dept: LAB | Facility: CLINIC | Age: 32
End: 2022-07-05
Payer: COMMERCIAL

## 2022-07-05 DIAGNOSIS — O02.1 MISSED ABORTION: ICD-10-CM

## 2022-07-05 DIAGNOSIS — R10.9 FLANK PAIN: ICD-10-CM

## 2022-07-05 LAB
ALBUMIN UR-MCNC: NEGATIVE MG/DL
APPEARANCE UR: CLEAR
B-HCG SERPL-ACNC: 5012 IU/L (ref 0–5)
BILIRUB UR QL STRIP: NEGATIVE
COLOR UR AUTO: COLORLESS
GLUCOSE UR STRIP-MCNC: NEGATIVE MG/DL
HGB UR QL STRIP: ABNORMAL
KETONES UR STRIP-MCNC: NEGATIVE MG/DL
LEUKOCYTE ESTERASE UR QL STRIP: NEGATIVE
NITRATE UR QL: NEGATIVE
PH UR STRIP: 7.5 [PH] (ref 5–7)
RBC #/AREA URNS AUTO: ABNORMAL /HPF
SP GR UR STRIP: 1 (ref 1–1.03)
SQUAMOUS #/AREA URNS AUTO: ABNORMAL /LPF
UROBILINOGEN UR STRIP-MCNC: NORMAL MG/DL
WBC #/AREA URNS AUTO: ABNORMAL /HPF

## 2022-07-05 PROCEDURE — 36415 COLL VENOUS BLD VENIPUNCTURE: CPT

## 2022-07-05 PROCEDURE — 81001 URINALYSIS AUTO W/SCOPE: CPT

## 2022-07-05 PROCEDURE — 76801 OB US < 14 WKS SINGLE FETUS: CPT

## 2022-07-05 PROCEDURE — 76817 TRANSVAGINAL US OBSTETRIC: CPT

## 2022-07-05 PROCEDURE — 84702 CHORIONIC GONADOTROPIN TEST: CPT

## 2022-07-06 ENCOUNTER — MYC MEDICAL ADVICE (OUTPATIENT)
Dept: OBGYN | Facility: CLINIC | Age: 32
End: 2022-07-06

## 2022-07-06 DIAGNOSIS — O02.1 MISSED ABORTION: Primary | ICD-10-CM

## 2022-07-06 NOTE — TELEPHONE ENCOUNTER
Nini Mckeon, DO  [facility] Ob/Gyn Triage  Yes, she can return to restarting her supplements if desired

## 2022-07-15 ENCOUNTER — LAB (OUTPATIENT)
Dept: LAB | Facility: CLINIC | Age: 32
End: 2022-07-15
Payer: COMMERCIAL

## 2022-07-15 DIAGNOSIS — O02.1 MISSED ABORTION: ICD-10-CM

## 2022-07-15 LAB — B-HCG SERPL-ACNC: 1079 IU/L (ref 0–5)

## 2022-07-15 PROCEDURE — 36415 COLL VENOUS BLD VENIPUNCTURE: CPT

## 2022-07-15 PROCEDURE — 84702 CHORIONIC GONADOTROPIN TEST: CPT

## 2022-07-26 ENCOUNTER — ANCILLARY PROCEDURE (OUTPATIENT)
Dept: ULTRASOUND IMAGING | Facility: CLINIC | Age: 32
End: 2022-07-26
Attending: OBSTETRICS & GYNECOLOGY
Payer: COMMERCIAL

## 2022-07-26 ENCOUNTER — LAB (OUTPATIENT)
Dept: LAB | Facility: CLINIC | Age: 32
End: 2022-07-26
Payer: COMMERCIAL

## 2022-07-26 ENCOUNTER — TELEPHONE (OUTPATIENT)
Dept: OBGYN | Facility: CLINIC | Age: 32
End: 2022-07-26

## 2022-07-26 DIAGNOSIS — O02.1 MISSED ABORTION: ICD-10-CM

## 2022-07-26 LAB
B-HCG SERPL-ACNC: 179 IU/L (ref 0–5)
RADIOLOGIST FLAGS: ABNORMAL

## 2022-07-26 PROCEDURE — 36415 COLL VENOUS BLD VENIPUNCTURE: CPT

## 2022-07-26 PROCEDURE — 84702 CHORIONIC GONADOTROPIN TEST: CPT

## 2022-07-26 PROCEDURE — 76830 TRANSVAGINAL US NON-OB: CPT | Performed by: RADIOLOGY

## 2022-07-26 PROCEDURE — 76856 US EXAM PELVIC COMPLETE: CPT | Performed by: RADIOLOGY

## 2022-07-26 NOTE — TELEPHONE ENCOUNTER
[facility] SURGERY PLANNING/SCHEDULING WORKSHEET                                                     Nini NICK Felipe                :  1990  MRN:  [id number]  Home Phone [phone]   Work Phone Not on file.   Mobile [phone]         Surgeon: Nini Mckeon DO    DIAGNOSIS:   Retained products of conception    SURGICAL PROCEDURE:  Suction D+C under ultrasound guidance    Surgery Location:  [facility]  Patient Surgery Class:  SDS  Length of Procedure:  30 minutes  Type of anesthesia:  MAC    Multi-surgeon case: No  OR Assistant needed:   No  Vendor needed: No  Positioning:  See Preference Card  Laterality:  NA  Date requested:  within 2 weeks    Special Equipment: None  Special Instructions for patient:  Per the [facility] Pre-Admission Nurse when they call the patient prior to the surgery date.   Precautions:  NONE  :  NOT NEEDED    Sterilization consent:  Not applicable to procedure being performed.    Preop: Pre-op options: PCP  Pre-surgery consult needed:  Not applicable.  Postop evaluation needed:  4 weeks    ALLERGIES:   Allergies   Allergen Reactions     Prednisone      unsure      BMI:There is no height or weight on file to calculate BMI.      The proposed surgical procedure is considered LOW risk.      Nini Mckeon DO    2022

## 2022-07-28 ENCOUNTER — TELEPHONE (OUTPATIENT)
Dept: OBGYN | Facility: CLINIC | Age: 32
End: 2022-07-28

## 2022-07-28 NOTE — TELEPHONE ENCOUNTER
[facility] SURGERY PLANNING/SCHEDULING WORKSHEET                                                     Nini NICK Felipe                :  1990  MRN:  [id number]  Home Phone [phone]   Work Phone Not on file.   Mobile [phone]         Surgeon: Nini Mckeon DO     DIAGNOSIS:   Retained products of conception     SURGICAL PROCEDURE:  Suction D+C under ultrasound guidance     Surgery Location:  [facility]  Patient Surgery Class:  SDS  Length of Procedure:  30 minutes  Type of anesthesia:  MAC     Multi-surgeon case: No  OR Assistant needed:   No  Vendor needed: No  Positioning:  See Preference Card  Laterality:  NA  Date requested:  within 2 weeks     Special Equipment: None  Special Instructions for patient:  Per the [facility] Pre-Admission Nurse when they call the patient prior to the surgery date.   Precautions:  NONE  :  NOT NEEDED     Sterilization consent:  Not applicable to procedure being performed.     Preop: Pre-op options: PCP  Pre-surgery consult needed:  Not applicable.  Postop evaluation needed:  4 weeks     ALLERGIES:         Allergies   Allergen Reactions     Prednisone         unsure      BMI:There is no height or weight on file to calculate BMI.       The proposed surgical procedure is considered LOW risk.        Nini Mckeon DO    2022     SURGERY SCHEDULING AND PRECERTIFICATION    Medical Record Number: [id number]  Nini Wang  YOB: 1990   Phone: [phone] (home)   Primary Provider: Annie Byrnes    Reason for Admit: Missed AB   ICD-10 CODE:  O02.1    Surgeon: Nini Mckeon DO  Surgical Procedure: Suction D&C with ultrasound guidance     Date of Surgery 22 Time of Surgery 10:45 am   Surgery to be performed at:  [facility]  Status: Outpatient  Type of Anesthesia Anticipated: MAC    Sterilization consent:  Not applicable to procedure being performed.    Pre-Op: On 08/3/22 with Jaden in [facility] at  9:10 am   COVID testing:  Covid testing is not required because the patient has been vaccinated, is not immunocompromised and does not have Covid symptoms per [facility] guidelines.   Post-Op:  4 weeks on 08/29/22 with Linda in [place]  at 8:15 am     Pre-certification routed to [employer]:  Yes    Surgery packet mailed to patient's home address: Yes  Patient instructed NPO 12 hours prior to surgery, arrive according to the time the nurse gives patient when called prior to surgery, must have a .  Patient understood and agrees to the plan.      Requestor:  Juliana Abdalla     Location:  [place] [phone]

## 2022-07-29 NOTE — TELEPHONE ENCOUNTER
PB DOS: 8/5/2022  Type of Procedure: Suction D&C with ultrasound guidance      CPT Codes: 94467  ICD10 Codes: Missed AB O02.1  Surgeon/Ordering provider: Dr. Mckeon  Pre-cert/Authorization completed:  No PA Required  Payer: [employer]  Spoke to [employer]- medical management dept  Ref. # 07/29/2022 [employer] REYES/ Auth #   Valid Dates:     Surgery [place] and last office [place] [facility]

## 2022-08-03 ENCOUNTER — OFFICE VISIT (OUTPATIENT)
Dept: FAMILY MEDICINE | Facility: CLINIC | Age: 32
End: 2022-08-03
Payer: COMMERCIAL

## 2022-08-03 VITALS
BODY MASS INDEX: 28.82 KG/M2 | DIASTOLIC BLOOD PRESSURE: 68 MMHG | WEIGHT: 168.8 LBS | OXYGEN SATURATION: 97 % | HEIGHT: 64 IN | RESPIRATION RATE: 16 BRPM | SYSTOLIC BLOOD PRESSURE: 104 MMHG | TEMPERATURE: 98.9 F

## 2022-08-03 DIAGNOSIS — O02.1 MISSED ABORTION: ICD-10-CM

## 2022-08-03 DIAGNOSIS — Z01.818 PREOP GENERAL PHYSICAL EXAM: Primary | ICD-10-CM

## 2022-08-03 DIAGNOSIS — D62 ANEMIA DUE TO BLOOD LOSS, ACUTE: ICD-10-CM

## 2022-08-03 LAB
BASOPHILS # BLD AUTO: 0 10E3/UL (ref 0–0.2)
BASOPHILS NFR BLD AUTO: 0 %
EOSINOPHIL # BLD AUTO: 0.1 10E3/UL (ref 0–0.7)
EOSINOPHIL NFR BLD AUTO: 2 %
ERYTHROCYTE [DISTWIDTH] IN BLOOD BY AUTOMATED COUNT: 13.5 % (ref 10–15)
HCT VFR BLD AUTO: 30.7 % (ref 35–47)
HGB BLD-MCNC: 9.4 G/DL (ref 11.7–15.7)
IMM GRANULOCYTES # BLD: 0 10E3/UL
IMM GRANULOCYTES NFR BLD: 0 %
LYMPHOCYTES # BLD AUTO: 1.8 10E3/UL (ref 0.8–5.3)
LYMPHOCYTES NFR BLD AUTO: 26 %
MCH RBC QN AUTO: 22.5 PG (ref 26.5–33)
MCHC RBC AUTO-ENTMCNC: 30.6 G/DL (ref 31.5–36.5)
MCV RBC AUTO: 73 FL (ref 78–100)
MONOCYTES # BLD AUTO: 0.5 10E3/UL (ref 0–1.3)
MONOCYTES NFR BLD AUTO: 7 %
NEUTROPHILS # BLD AUTO: 4.5 10E3/UL (ref 1.6–8.3)
NEUTROPHILS NFR BLD AUTO: 64 %
PLATELET # BLD AUTO: 420 10E3/UL (ref 150–450)
RBC # BLD AUTO: 4.18 10E6/UL (ref 3.8–5.2)
WBC # BLD AUTO: 7 10E3/UL (ref 4–11)

## 2022-08-03 PROCEDURE — 36415 COLL VENOUS BLD VENIPUNCTURE: CPT | Performed by: PHYSICIAN ASSISTANT

## 2022-08-03 PROCEDURE — 99214 OFFICE O/P EST MOD 30 MIN: CPT | Performed by: PHYSICIAN ASSISTANT

## 2022-08-03 PROCEDURE — 85025 COMPLETE CBC W/AUTO DIFF WBC: CPT | Performed by: PHYSICIAN ASSISTANT

## 2022-08-03 NOTE — PROGRESS NOTES
[facility] COTO  [address], [address]  [place] [address]  Phone: [phone]  Fax: [phone]  Primary Provider: Annie Byrnes  Pre-op Performing Provider: MARTÍNEZ DE DIOS      PREOPERATIVE EVALUATION:  Today's date: 8/3/2022    Nini Wang is a 32 year old female who presents for a preoperative evaluation.    Surgical Information:  Surgery/Procedure: Suction dilation and curettage with ultrasound guidance  Surgery Location: [facility]  Surgeon: Nini Mckeon DO  Surgery Date: 2022  Time of Surgery: TBD  Where patient plans to recover: At home with family  Fax number for surgical facility: Note does not need to be faxed, will be available electronically in Epic.    Type of Anesthesia Anticipated: to be determined    Assessment & Plan     The proposed surgical procedure is considered INTERMEDIATE risk.    Preop general physical exam  Missed   Pt can exercise >4 METs , no CV sx at rest or with exertion.  Chronic conditions are stable  Surgery as scheduled.   Labs ordered below or as indicated.   Covid testing - per surgeon/facility.  Reviewed preop info in AVS with pt including NPO, showering, medication recommendations, indications to delay/schedule (ie new illness s/sx). Pt questions answered.  - CBC with platelets and differential; Future  - CBC with platelets and differential    Anemia due to blood loss, acute  Pt has had uterine bleeding for > 6 weeks.   Last hgb 6 mo ago was 11.5 g/dl, today 9.4 g/dl.   If her prenatal has iron continue, if not may need additional supplementation.   Improvement also anticipated w/resolution of bleeding.   Follow up CBC with OB/GYN .           Risks and Recommendations:  The patient has the following additional risks and recommendations for perioperative complications:   - No identified additional risk factors other than previously addressed    Medication Instructions:  Patient is on no chronic  medications    RECOMMENDATION:  APPROVAL GIVEN to proceed with proposed procedure, without further diagnostic evaluation.    Azalea Stanley PA-C      Subjective     HPI related to upcoming procedure: early pregnancy loss with missed . Suction D&C with US guidance.  No fevers. On and off since early . Rates at 1-2/10 right flank pain. This is unchanged. Has had vaginal bleeding on and off for 2 months. Mild cramping- quality and degree of pain is stable.     Preop Questions 2022   1. Have you ever had a heart attack or stroke? No   2. Have you ever had surgery on your heart or blood vessels, such as a stent placement, a coronary artery bypass, or surgery on an artery in your head, neck, heart, or legs? No   3. Do you have chest pain with activity? No   4. Do you have a history of  heart failure? No   5. Do you currently have a cold, bronchitis or symptoms of other infection? No   6. Do you have a cough, shortness of breath, or wheezing? No   7. Do you or anyone in your family have previous history of blood clots? No   8. Do you or does anyone in your family have a serious bleeding problem such as prolonged bleeding following surgeries or cuts? No   9. Have you ever had problems with anemia or been told to take iron pills? YES - not taking iron. Has had with pregnancies.    10. Have you had any abnormal blood loss such as black, tarry or bloody stools, or abnormal vaginal bleeding? No   11. Have you ever had a blood transfusion? No   12. Are you willing to have a blood transfusion if it is medically needed before, during, or after your surgery? Yes   13. Have you or any of your relatives ever had problems with anesthesia? No   14. Do you have sleep apnea, excessive snoring or daytime drowsiness? No   15. Do you have any artifical heart valves or other implanted medical devices like a pacemaker, defibrillator, or continuous glucose monitor? No   16. Do you have artificial joints? No   17. Are you  allergic to latex? No   18. Is there any chance that you may be pregnant? No       Health Care Directive:  Patient does not have a Health Care Directive or Living Will: Discussed advance care planning with patient; however, patient declined at this time.    Preoperative Review of :   reviewed - controlled substances reflected in medication list.  Was prescribed xanax due to procedural anxiety. Took once earlier this week.    Childhood heart murmur with normal echo as adult.     Exercise induced asthma- used to use albuterol prior to exercise teens/20s. Not needed as much in adult years. Covid early June 2022- did not need inhaler- no CP. Recovered fully    Pt denies any personal medical history of diabetes, hypertension, hyperlipidemia, thyroid problems, CKD, irregular heartbeat/a.fib, CAD/PVD, sleep apnea, DVT/PE. Patient is a nonsmoker.    For exercise: [employer] cardio videos and strength- 30min a day, walking; feels good with exercise. Limitations with exercise due to: nothing. Denies CV sxs with exercise including CP, SOB, palpitations, [place], presyncope.      Review of Systems  CONSTITUTIONAL: NEGATIVE for fever, chills, change in weight  INTEGUMENTARY/SKIN: NEGATIVE for worrisome rashes, moles or lesions  EYES: NEGATIVE for vision changes or irritation  ENT/MOUTH: NEGATIVE for ear, mouth and throat problems  RESP: NEGATIVE for significant cough or SOB  CV: NEGATIVE for chest pain, palpitations or peripheral edema  GI: NEGATIVE for nausea, abdominal pain, heartburn, or change in bowel habits  : NEGATIVE for frequency, dysuria, or hematuria  MUSCULOSKELETAL: NEGATIVE for significant arthralgias or myalgia  NEURO: NEGATIVE for weakness, dizziness or paresthesias  ENDOCRINE: NEGATIVE for temperature intolerance, skin/hair changes  HEME: NEGATIVE for bleeding problems  PSYCHIATRIC: NEGATIVE for changes in mood or affect    There are no problems to display for this patient.     Past Medical History:   Diagnosis  "Date     Anemia     taking iron supplement     Blood type A+      Depression     age [age] - now in remission     Depressive disorder 2005?    No symptoms since 2008     Low iron stores     has taken iron in 2014     Uncomplicated asthma     exercise induced asthma     Past Surgical History:   Procedure Laterality Date     INSERT INTRAUTERINE DEVICE  12/18/2017    needs to be removed 12/18/22     NO HISTORY OF SURGERY       Current Outpatient Medications   Medication Sig Dispense Refill     ALPRAZolam (XANAX) 0.25 MG tablet Take 1 tablet (0.25 mg) by mouth 3 times daily as needed for anxiety 15 tablet 0     HYDROcodone-acetaminophen (NORCO) 5-325 MG tablet Take 1 tablet by mouth every 6 hours as needed for severe pain 10 tablet 0     ibuprofen (ADVIL/MOTRIN) 800 MG tablet Take 1 tablet (800 mg) by mouth every 8 hours as needed for other (Cramping) 21 tablet 0     loperamide (IMODIUM A-D) 2 MG tablet Take 1 tablet (2 mg) by mouth 4 times daily as needed for diarrhea 10 tablet 0     ondansetron (ZOFRAN) 4 MG tablet Take 1 tablet (4 mg) by mouth every 6 hours as needed for nausea 10 tablet 0       Allergies   Allergen Reactions     Prednisone      unsure        Social History     Tobacco Use     Smoking status: Never Smoker     Smokeless tobacco: Never Used   Substance Use Topics     Alcohol use: Yes     Comment: occ-not in PG     Family History   Problem Relation Age of Onset     Thyroid Disease Mother      Gastrointestinal Disease Father         small bowel obstructions     Prostate Cancer Father      Dementia Maternal Grandmother         vascular     Lung Cancer Paternal Grandmother      Breast Cancer Paternal Grandmother      Pancreatic Cancer Paternal Grandfather 78     Other Cancer Paternal Grandfather         Pancreatic     Breast Cancer Other         Maternal Aunt     History   Drug Use No         Objective     /68   Temp 98.9  F (37.2  C) (Temporal)   Resp 16   Ht 1.615 m (5' 3.58\")   Wt 76.6 kg (168 " lb 12.8 oz)   SpO2 97%   BMI 29.36 kg/m      Physical Exam    GENERAL APPEARANCE: healthy, alert and no distress     EYES: EOMI, PERRL     HENT: ear canals and TM's normal and nose and mouth without ulcers or lesions     NECK: no adenopathy, no asymmetry, masses, or scars and thyroid normal to palpation     RESP: lungs clear to auscultation - no rales, rhonchi or wheezes     CV: regular rates and rhythm, normal S1 S2, no S3 or S4 and no murmur, click or rub     ABDOMEN:  soft, nontender, no HSM or masses and bowel sounds normal     MS: extremities normal- no gross deformities noted, no evidence of inflammation in joints, FROM in all extremities.     SKIN: no suspicious lesions or rashes     NEURO: Normal strength and tone, sensory exam grossly normal, mentation intact and speech normal     PSYCH: mentation appears normal. and affect normal/bright     LYMPHATICS: No cervical adenopathy    Recent Labs   Lab Test 06/23/22  1405 07/06/21  1810   HGB 11.5* 12.5     --         Diagnostics:  Recent Results (from the past 24 hour(s))   CBC with platelets and differential    Collection Time: 08/03/22  9:52 AM   Result Value Ref Range    WBC Count 7.0 4.0 - 11.0 10e3/uL    RBC Count 4.18 3.80 - 5.20 10e6/uL    Hemoglobin 9.4 (L) 11.7 - 15.7 g/dL    Hematocrit 30.7 (L) 35.0 - 47.0 %    MCV 73 (L) 78 - 100 fL    MCH 22.5 (L) 26.5 - 33.0 pg    MCHC 30.6 (L) 31.5 - 36.5 g/dL    RDW 13.5 10.0 - 15.0 %    Platelet Count 420 150 - 450 10e3/uL    % Neutrophils 64 %    % Lymphocytes 26 %    % Monocytes 7 %    % Eosinophils 2 %    % Basophils 0 %    % Immature Granulocytes 0 %    Absolute Neutrophils 4.5 1.6 - 8.3 10e3/uL    Absolute Lymphocytes 1.8 0.8 - 5.3 10e3/uL    Absolute Monocytes 0.5 0.0 - 1.3 10e3/uL    Absolute Eosinophils 0.1 0.0 - 0.7 10e3/uL    Absolute Basophils 0.0 0.0 - 0.2 10e3/uL    Absolute Immature Granulocytes 0.0 <=0.4 10e3/uL      No EKG required, no history of coronary heart disease, significant  arrhythmia, peripheral arterial disease or other structural heart disease.    Revised Cardiac Risk Index (RCRI):  The patient has the following serious cardiovascular risks for perioperative complications:   - No serious cardiac risks = 0 points     RCRI Interpretation: 0 points: Class I (very low risk - 0.4% complication rate)           Signed Electronically by: Azalea Stanley PA-C  Copy of this evaluation report is provided to requesting physician.

## 2022-08-03 NOTE — PATIENT INSTRUCTIONS
Preparing for Your Surgery  Getting started  A nurse will call you to review your health history and instructions. They will give you an arrival time based on your scheduled surgery time. Please be ready to share:    Your doctor's clinic name and phone number    Your medical, surgical and anesthesia history    A list of allergies and sensitivities    A list of medicines, including herbal treatments and over-the-counter drugs    Whether the patient has a legal guardian (ask how to send us the papers in advance)  Please tell us if you're pregnant--or if there's any chance you might be pregnant. Some surgeries may injure a fetus (unborn baby), so they require a pregnancy test. Surgeries that are safe for a fetus don't always need a test, and you can choose whether to have one.   If you have a child who's having surgery, please ask for a copy of Preparing for Your Child's Surgery.    Preparing for surgery    Within 30 days of surgery: Have a pre-op exam (sometimes called an H&P, or History and Physical). This can be done at a clinic or pre-operative center.  ? If you're having a , you may not need this exam. Talk to your care team.    At your pre-op exam, talk to your care team about all medicines you take. If you need to stop any medicines before surgery, ask when to start taking them again.  ? We do this for your safety. Many medicines can make you bleed too much during surgery. Some change how well surgery (anesthesia) drugs work.    Call your insurance company to let them know you're having surgery. (If you don't have insurance, call [phone].)    Call your clinic if there's any change in your health. This includes signs of a cold or flu (sore throat, runny nose, cough, rash, fever). It also includes a scrape or scratch near the surgery site.    If you have questions on the day of surgery, call your hospital or surgery center.  COVID testing  You may need to be tested for COVID-19 before having  surgery. If so, we will give you instructions.  Eating and drinking guidelines  For your safety: Unless your surgeon tells you otherwise, follow the guidelines below.    Eat and drink as usual until 8 hours before surgery. After that, no food or milk.    Drink clear liquids until 2 hours before surgery. These are liquids you can see through, like water, Gatorade and Propel Water. You may also have black coffee and tea (no cream or milk).    Nothing by mouth within 2 hours of surgery. This includes gum, candy and breath mints.    If you drink alcohol: Stop drinking it the night before surgery.    If your care team tells you to take medicine on the morning of surgery, it's okay to take it with a sip of water.  Preventing infection    Shower or bathe the night before and morning of your surgery. Follow the instructions your clinic gave you. (If no instructions, use regular soap.)    Don't shave or clip hair near your surgery site. We'll remove the hair if needed.    Don't smoke or vape the morning of surgery. You may chew nicotine gum up to 2 hours before surgery. A nicotine patch is okay.  ? Note: Some surgeries require you to completely quit smoking and nicotine. Check with your surgeon.    Your care team will make every effort to keep you safe from infection. We will:  ? Clean our hands often with soap and water (or an alcohol-based hand rub).  ? Clean the skin at your surgery site with a special soap that kills germs.  ? Give you a special gown to keep you warm. (Cold raises the risk of infection.)  ? Wear special hair covers, masks, gowns and gloves during surgery.  ? Give antibiotic medicine, if prescribed. Not all surgeries need antibiotics.  What to bring on the day of surgery    Photo ID and insurance card    Copy of your health care directive, if you have one    Glasses and hearing aides (bring cases)  ? You can't wear contacts during surgery    Inhaler and eye drops, if you use them (tell us about these when  you arrive)    CPAP machine or breathing device, if you use them    A few personal items, if spending the night    If you have . . .  ? A pacemaker, ICD (cardiac defibrillator) or other implant: Bring the ID card.  ? An implanted stimulator: Bring the remote control.  ? A legal guardian: Bring a copy of the certified (court-stamped) guardianship papers.  Please remove any jewelry, including body piercings. Leave jewelry and other valuables at home.  If you're going home the day of surgery    You must have a responsible adult drive you home. They should stay with you overnight as well.    If you don't have someone to stay with you, and you aren't safe to go home alone, we may keep you overnight. Insurance often won't pay for this.  After surgery  If it's hard to control your pain or you need more pain medicine, please call your surgeon's office.  Questions?   If you have any questions for your care team, list them here: _________________________________________________________________________________________________________________________________________________________________________ ____________________________________ ____________________________________ ____________________________________  For informational purposes only. Not to replace the advice of your health care provider. Copyright   2003, 2019 [facility]. All rights reserved. Clinically reviewed by Saba Plaza MD. [employer] [id number] - REV 07/21.

## 2022-08-11 ENCOUNTER — MYC MEDICAL ADVICE (OUTPATIENT)
Dept: OBGYN | Facility: CLINIC | Age: 32
End: 2022-08-11

## 2022-08-11 NOTE — TELEPHONE ENCOUNTER
Nini Mckeon,   [facility] Triage 7 minutes ago (12:35 PM)     KK    Pathology consistent with retained products of conception. No fetal tissue present or indication for molar pregnancy, which is very reassuring. We can discuss this further at her post op visit unless she has further questions.     Thank you,   Nini Mckeon DO

## 2022-08-11 NOTE — TELEPHONE ENCOUNTER
Pt had a suction D&C on 8/5 at the [facility].    Pt is writing in asking about the results from the D&C.    Routing to Dr. Mckeon.    Josefa Marvin RN

## 2022-08-29 ENCOUNTER — OFFICE VISIT (OUTPATIENT)
Dept: OBGYN | Facility: CLINIC | Age: 32
End: 2022-08-29
Payer: COMMERCIAL

## 2022-08-29 VITALS
WEIGHT: 169.9 LBS | DIASTOLIC BLOOD PRESSURE: 72 MMHG | SYSTOLIC BLOOD PRESSURE: 110 MMHG | BODY MASS INDEX: 29.55 KG/M2 | HEART RATE: 84 BPM

## 2022-08-29 DIAGNOSIS — R10.11 RIGHT UPPER QUADRANT ABDOMINAL PAIN: ICD-10-CM

## 2022-08-29 DIAGNOSIS — Z98.890 POSTOPERATIVE STATE: Primary | ICD-10-CM

## 2022-08-29 PROCEDURE — 99024 POSTOP FOLLOW-UP VISIT: CPT | Performed by: OBSTETRICS & GYNECOLOGY

## 2022-08-29 NOTE — PROGRESS NOTES
SUBJECTIVE:       HPI: Nini Wang is a 32 year old  is s/p sD+C on  for retained POC after medication management of SAB. Since surgery, she has been doing well. She has had minimal vaginal bleeding now stopped. She has been sexually active.      is going to his doctor to reevaluate his prior vasectomy, discuss revision.    Mild, intermittent mid-upper right sided pain, present since prior to SAB. Not always present. No association with food/diet, BM. No dysuria or hematuria. Daily BM.    Ob Hx:     Gyn Hx: No LMP recorded. (Menstrual status: Irregular Periods).     Last pap was 2021 nil neg hpv         Today's PHQ-2 Score:   PHQ-2 (  [employer]) 2022   Q1: Little interest or pleasure in doing things 1   Q2: Feeling down, depressed or hopeless 1   PHQ-2 Score 2   PHQ-2 Total Score (12-17 Years)- Positive if 3 or more points; Administer PHQ-A if positive -   Q1: Little interest or pleasure in doing things Several days   Q2: Feeling down, depressed or hopeless Several days   PHQ-2 Score 2     Today's PHQ-9 Score:   PHQ-9 SCORE 2020   PHQ-9 Total Score 2     Today's GARCIA-7 Score:   GARCIA-7 SCORE 2020   Total Score 0       Problem list and histories reviewed & adjusted, as indicated.  Additional history: as documented.    Patient Active Problem List   Diagnosis   (none) - all problems resolved or deleted     Past Surgical History:   Procedure Laterality Date     INSERT INTRAUTERINE DEVICE  2017    needs to be removed 22     NO HISTORY OF SURGERY        Social History     Tobacco Use     Smoking status: Never Smoker     Smokeless tobacco: Never Used   Substance Use Topics     Alcohol use: Yes     Comment: occ-not in PG      Problem (# of Occurrences) Relation (Name,Age of Onset)    Breast Cancer (2) Paternal Grandmother (), Other (Emeli): Maternal Aunt    Dementia (1) Maternal Grandmother: vascular    Gastrointestinal Disease (1) Father (Christian): small bowel  obstructions    Lung Cancer (1) Paternal Grandmother ()    Other Cancer (1) Paternal Grandfather (Evin): Pancreatic    Pancreatic Cancer (1) Paternal Grandfather (Evin, 78)    Prostate Cancer (1) Father (Christian)    Thyroid Disease (1) Mother (Tiffanie)            ferrous sulfate (FEROSUL) 325 (65 Fe) MG tablet, Take 1 tablet (325 mg) by mouth daily (with breakfast)    No current facility-administered medications on file prior to visit.    Allergies   Allergen Reactions     Prednisone      unsure       ROS:  10 Point review of systems negative other noted above in HPI    OBJECTIVE:     /72   Pulse 84   Wt 77.1 kg (169 lb 14.4 oz)   BMI 29.55 kg/m    Body mass index is 29.55 kg/m .      Gen: Alert, oriented, appropriately interactive, NAD  Chest: Symmetrical, unlabored breathing  Abdomen: non distended, soft, mild ttp right side, primarily at costal angles ribs 11-12. Neg haney sign, -g/r/r  MSK: normal gait, symmetric movements UE & LE  Lower extremities: non-tender, no edema      In-Clinic Test Results:  No results found for this or any previous visit (from the past 24 hour(s)).   Final Diagnosis        Uterus, dilatation and curettage - Degenerating immature chorionic villi and decidua (products of conception).            ASSESSMENT/PLAN:                                                      iNni Wang is a 32 year old  s/p sD+C on  for retained POC after medication management of SAB.      ICD-10-CM    1. Postoperative state  Z98.890    2. Right upper quadrant abdominal pain  R10.11        Doing well postoperatively. Minimal vaginal bleeding since surgery.     Cleared for routine to normal activity, including intercourse.     Contraception - vasectomy revision? RTO if desires contraception after consultation with his physician.    Right sided pain, suspect msk-etiology with rib tenderness. Conservative measures reviewed, discussed PT/chiropractor options. Discussed when to seek care, go to the  ER. Red flag symptoms reviewed. All questions answered        Nini Mckeon DO  [facility]

## 2022-08-29 NOTE — PATIENT INSTRUCTIONS
If you have any questions regarding your visit, Please contact your care team.    [email or web address]: [phone]      Women s Health CLINIC HOURS TELEPHONE NUMBER   Nini Mckeon DO.    KAROLINE Powell -Surgery Scheduler  Veronica - Surgery Scheduler    TAVIA Rivero, RN  TAVIA El     Monday, Thursday  [place]  7am-3pm    Tuesday, Wednesday  [place]  7am-3pm    E/O Friday &   [place]    Typical Surgery Days: Thursday or Friday   [facility]  [address] [place] [address] [phone] Phone  [phone] Fax    [address] [address] [phone] Phone    Imaging [phone] Phone    [facility] Labor and Delivery:  [phone] Phone     **Surgeries** Our Surgery Schedulers will contact you to schedule. If you do not receive a call within 3 business days, please call [phone].    Urgent Care locations:  [facility] Saturday and    9 am - 5 pm    Monday-Friday   12 pm - 8 pm  Saturday and    9 am - 5 pm   [phone]       If you need a medication refill, please contact your pharmacy. Please allow 3 business days for your refill to be completed.  As always, Thank you for trusting us with your healthcare needs!

## 2022-09-10 ENCOUNTER — HEALTH MAINTENANCE LETTER (OUTPATIENT)
Age: 32
End: 2022-09-10

## 2022-12-12 ASSESSMENT — ENCOUNTER SYMPTOMS
EYE PAIN: 0
SORE THROAT: 0
NAUSEA: 0
HEMATURIA: 0
SHORTNESS OF BREATH: 0
DIZZINESS: 0
MYALGIAS: 0
HEARTBURN: 0
CONSTIPATION: 0
ABDOMINAL PAIN: 0
COUGH: 0
FEVER: 0
NERVOUS/ANXIOUS: 1
FREQUENCY: 0
PARESTHESIAS: 0
JOINT SWELLING: 0
ARTHRALGIAS: 0
HEADACHES: 0
CHILLS: 0
HEMATOCHEZIA: 0
PALPITATIONS: 0
DYSURIA: 0
WEAKNESS: 0
BREAST MASS: 0
DIARRHEA: 0

## 2022-12-19 ENCOUNTER — OFFICE VISIT (OUTPATIENT)
Dept: FAMILY MEDICINE | Facility: CLINIC | Age: 32
End: 2022-12-19
Payer: COMMERCIAL

## 2022-12-19 VITALS
RESPIRATION RATE: 16 BRPM | WEIGHT: 171.4 LBS | SYSTOLIC BLOOD PRESSURE: 112 MMHG | TEMPERATURE: 99.2 F | DIASTOLIC BLOOD PRESSURE: 64 MMHG | OXYGEN SATURATION: 100 % | HEIGHT: 63 IN | BODY MASS INDEX: 30.37 KG/M2 | HEART RATE: 104 BPM

## 2022-12-19 DIAGNOSIS — R00.0 HEART RATE FAST: ICD-10-CM

## 2022-12-19 DIAGNOSIS — F41.1 GAD (GENERALIZED ANXIETY DISORDER): ICD-10-CM

## 2022-12-19 DIAGNOSIS — N94.6 DYSMENORRHEA: ICD-10-CM

## 2022-12-19 DIAGNOSIS — Z00.00 ROUTINE GENERAL MEDICAL EXAMINATION AT A HEALTH CARE FACILITY: Primary | ICD-10-CM

## 2022-12-19 LAB
BASOPHILS # BLD AUTO: 0 10E3/UL (ref 0–0.2)
BASOPHILS NFR BLD AUTO: 0 %
CHOLEST SERPL-MCNC: 133 MG/DL
EOSINOPHIL # BLD AUTO: 0.2 10E3/UL (ref 0–0.7)
EOSINOPHIL NFR BLD AUTO: 3 %
ERYTHROCYTE [DISTWIDTH] IN BLOOD BY AUTOMATED COUNT: 14.3 % (ref 10–15)
FASTING STATUS PATIENT QL REPORTED: NO
FERRITIN SERPL-MCNC: 11 NG/ML (ref 12–150)
HBA1C MFR BLD: 5.5 % (ref 0–5.6)
HCT VFR BLD AUTO: 37 % (ref 35–47)
HDLC SERPL-MCNC: 53 MG/DL
HGB BLD-MCNC: 11.3 G/DL (ref 11.7–15.7)
IMM GRANULOCYTES # BLD: 0 10E3/UL
IMM GRANULOCYTES NFR BLD: 0 %
IRON SATN MFR SERPL: 26 % (ref 15–46)
IRON SERPL-MCNC: 104 UG/DL (ref 35–180)
LDLC SERPL CALC-MCNC: 65 MG/DL
LYMPHOCYTES # BLD AUTO: 1.8 10E3/UL (ref 0.8–5.3)
LYMPHOCYTES NFR BLD AUTO: 27 %
MCH RBC QN AUTO: 22.6 PG (ref 26.5–33)
MCHC RBC AUTO-ENTMCNC: 30.5 G/DL (ref 31.5–36.5)
MCV RBC AUTO: 74 FL (ref 78–100)
MONOCYTES # BLD AUTO: 0.6 10E3/UL (ref 0–1.3)
MONOCYTES NFR BLD AUTO: 8 %
NEUTROPHILS # BLD AUTO: 4.3 10E3/UL (ref 1.6–8.3)
NEUTROPHILS NFR BLD AUTO: 63 %
NONHDLC SERPL-MCNC: 80 MG/DL
PLATELET # BLD AUTO: 351 10E3/UL (ref 150–450)
RBC # BLD AUTO: 5 10E6/UL (ref 3.8–5.2)
TIBC SERPL-MCNC: 403 UG/DL (ref 240–430)
TRIGL SERPL-MCNC: 74 MG/DL
TSH SERPL DL<=0.005 MIU/L-ACNC: 1.38 MU/L (ref 0.4–4)
WBC # BLD AUTO: 6.8 10E3/UL (ref 4–11)

## 2022-12-19 PROCEDURE — 82728 ASSAY OF FERRITIN: CPT | Performed by: NURSE PRACTITIONER

## 2022-12-19 PROCEDURE — 36415 COLL VENOUS BLD VENIPUNCTURE: CPT | Performed by: NURSE PRACTITIONER

## 2022-12-19 PROCEDURE — 99395 PREV VISIT EST AGE 18-39: CPT | Mod: 25 | Performed by: NURSE PRACTITIONER

## 2022-12-19 PROCEDURE — 96127 BRIEF EMOTIONAL/BEHAV ASSMT: CPT | Performed by: NURSE PRACTITIONER

## 2022-12-19 PROCEDURE — 83550 IRON BINDING TEST: CPT | Performed by: NURSE PRACTITIONER

## 2022-12-19 PROCEDURE — 85025 COMPLETE CBC W/AUTO DIFF WBC: CPT | Performed by: NURSE PRACTITIONER

## 2022-12-19 PROCEDURE — 80048 BASIC METABOLIC PNL TOTAL CA: CPT | Performed by: NURSE PRACTITIONER

## 2022-12-19 PROCEDURE — 93000 ELECTROCARDIOGRAM COMPLETE: CPT | Performed by: NURSE PRACTITIONER

## 2022-12-19 PROCEDURE — 83036 HEMOGLOBIN GLYCOSYLATED A1C: CPT | Performed by: NURSE PRACTITIONER

## 2022-12-19 PROCEDURE — 91312 COVID-19 VACCINE BIVALENT BOOSTER 12+ (PFIZER): CPT | Performed by: NURSE PRACTITIONER

## 2022-12-19 PROCEDURE — 84443 ASSAY THYROID STIM HORMONE: CPT | Performed by: NURSE PRACTITIONER

## 2022-12-19 PROCEDURE — 99213 OFFICE O/P EST LOW 20 MIN: CPT | Mod: 25 | Performed by: NURSE PRACTITIONER

## 2022-12-19 PROCEDURE — 80061 LIPID PANEL: CPT | Performed by: NURSE PRACTITIONER

## 2022-12-19 PROCEDURE — 0124A COVID-19 VACCINE BIVALENT BOOSTER 12+ (PFIZER): CPT | Performed by: NURSE PRACTITIONER

## 2022-12-19 PROCEDURE — 83540 ASSAY OF IRON: CPT | Performed by: NURSE PRACTITIONER

## 2022-12-19 ASSESSMENT — ANXIETY QUESTIONNAIRES
5. BEING SO RESTLESS THAT IT IS HARD TO SIT STILL: MORE THAN HALF THE DAYS
GAD7 TOTAL SCORE: 11
7. FEELING AFRAID AS IF SOMETHING AWFUL MIGHT HAPPEN: MORE THAN HALF THE DAYS
GAD7 TOTAL SCORE: 11
6. BECOMING EASILY ANNOYED OR IRRITABLE: SEVERAL DAYS
4. TROUBLE RELAXING: MORE THAN HALF THE DAYS
8. IF YOU CHECKED OFF ANY PROBLEMS, HOW DIFFICULT HAVE THESE MADE IT FOR YOU TO DO YOUR WORK, TAKE CARE OF THINGS AT HOME, OR GET ALONG WITH OTHER PEOPLE?: SOMEWHAT DIFFICULT
3. WORRYING TOO MUCH ABOUT DIFFERENT THINGS: SEVERAL DAYS
7. FEELING AFRAID AS IF SOMETHING AWFUL MIGHT HAPPEN: MORE THAN HALF THE DAYS
2. NOT BEING ABLE TO STOP OR CONTROL WORRYING: SEVERAL DAYS
1. FEELING NERVOUS, ANXIOUS, OR ON EDGE: MORE THAN HALF THE DAYS
IF YOU CHECKED OFF ANY PROBLEMS ON THIS QUESTIONNAIRE, HOW DIFFICULT HAVE THESE PROBLEMS MADE IT FOR YOU TO DO YOUR WORK, TAKE CARE OF THINGS AT HOME, OR GET ALONG WITH OTHER PEOPLE: SOMEWHAT DIFFICULT

## 2022-12-19 ASSESSMENT — ENCOUNTER SYMPTOMS
NERVOUS/ANXIOUS: 1
HEARTBURN: 0
WEAKNESS: 0
DIARRHEA: 0
CONSTIPATION: 0
ABDOMINAL PAIN: 0
CHILLS: 0
PARESTHESIAS: 0
JOINT SWELLING: 0
DIZZINESS: 0
SHORTNESS OF BREATH: 0
SORE THROAT: 0
ARTHRALGIAS: 0
HEMATURIA: 0
BREAST MASS: 0
DYSURIA: 0
NAUSEA: 0
FEVER: 0
HEADACHES: 0
HEMATOCHEZIA: 0
PALPITATIONS: 0
EYE PAIN: 0
FREQUENCY: 0
MYALGIAS: 0
COUGH: 0

## 2022-12-19 ASSESSMENT — PAIN SCALES - GENERAL: PAINLEVEL: NO PAIN (0)

## 2022-12-19 NOTE — PROGRESS NOTES
SUBJECTIVE:   CC: Azalea is an 32 year old who presents for preventive health visit.   Patient has been advised of split billing requirements and indicates understanding: Yes  Healthy Habits:     Getting at least 3 servings of Calcium per day:  Yes    Bi-annual eye exam:  NO    Dental care twice a year:  Yes    Sleep apnea or symptoms of sleep apnea:  None    Diet:  Regular (no restrictions)    Frequency of exercise:  4-5 days/week    Duration of exercise:  15-30 minutes    Taking medications regularly:  Not Applicable    Medication side effects:  Not applicable    PHQ-2 Total Score: 1    Additional concerns today:  Yes (1) anxiety 2) post miscarraige issues )    Had D & C for missed  in Aug. Unplanned after her 's incomplete vasectomy. Feels coping well. Noticing fast HR at times up to 130's for several hours on her fitness watch.   Thinks this is causing her to feel anxious. Rates anxiety- moderately on GAD7.   Life stress- moderate.   No missed beats, CP, SOB.   Sleeping well- needs more, getting 6-7.5 hours.   Has fatigue.   Episodes of fast heart rate, unprovoked at rest. Occur about 3-4 times a week, lasing several minutes.     Periods- regular after D& C, but more cramping. Has slighlty heavier periods, lasting 4-5 days, but feels flow is overall still normal.    has second vasectomy.             Today's PHQ-2 Score:   PHQ-2 (  [employer]) 2022   Q1: Little interest or pleasure in doing things 1   Q2: Feeling down, depressed or hopeless 0   PHQ-2 Score 1   PHQ-2 Total Score (12-17 Years)- Positive if 3 or more points; Administer PHQ-A if positive -   Q1: Little interest or pleasure in doing things Several days   Q2: Feeling down, depressed or hopeless Not at all   PHQ-2 Score 1           Social History     Tobacco Use     Smoking status: Never     Smokeless tobacco: Never   Substance Use Topics     Alcohol use: Yes     Comment: occ-not in PG     If you drink alcohol do you  typically have >3 drinks per day or >7 drinks per week? No    Alcohol Use 12/19/2022   Prescreen: >3 drinks/day or >7 drinks/week? -   Prescreen: >3 drinks/day or >7 drinks/week? No       Reviewed orders with patient.  Reviewed health maintenance and updated orders accordingly - Yes  Lab work is in process    Breast Cancer Screening:    FHS-7:   Breast CA Risk Assessment (FHS-7) 7/3/2021   Did any of your first-degree relatives have breast or ovarian cancer? Yes   Did any of your relatives have bilateral breast cancer? Unknown   Did any man in your family have breast cancer? No   Did any woman in your family have breast and ovarian cancer? Yes   Did any woman in your family have breast cancer before age 50 y? Yes   Do you have 2 or more relatives with breast and/or ovarian cancer? Yes   Do you have 2 or more relatives with breast and/or bowel cancer? Yes     No first degree relatives with cancers.     Patient under 40 years of age: Routine Mammogram Screening not recommended.   Pertinent mammograms are reviewed under the imaging tab.    History of abnormal Pap smear:   NO - age 30-65 PAP every 5 years with negative HPV co-testing recommended  Last 3 Pap Results:   PAP (no units)   Date Value   07/06/2021 NIL   11/07/2018 NIL   07/22/2015 NIL     Last 3 Pap and HPV Results:   PAP / HPV Latest Ref Rng & Units 7/6/2021 11/7/2018 7/22/2015   PAP (Historical) - NIL NIL NIL   HPV16 NEG:Negative Negative - -   HPV18 NEG:Negative Negative - -   HRHPV NEG:Negative Negative - -     PAP / HPV Latest Ref Rng & Units 7/6/2021 11/7/2018 7/22/2015   PAP (Historical) - NIL NIL NIL   HPV16 NEG:Negative Negative - -   HPV18 NEG:Negative Negative - -   HRHPV NEG:Negative Negative - -     Reviewed and updated as needed this visit by clinical staff   Tobacco  Allergies  Meds              Reviewed and updated as needed this visit by Provider                     Review of Systems   Constitutional: Negative for chills and fever.   HENT:  "Negative for congestion, ear pain, hearing loss and sore throat.    Eyes: Negative for pain and visual disturbance.   Respiratory: Negative for cough and shortness of breath.    Cardiovascular: Negative for chest pain, palpitations and peripheral edema.   Gastrointestinal: Negative for abdominal pain, constipation, diarrhea, heartburn, hematochezia and nausea.   Breasts:  Negative for tenderness, breast mass and discharge.   Genitourinary: Negative for dysuria, frequency, genital sores, hematuria, pelvic pain, urgency, vaginal bleeding and vaginal discharge.   Musculoskeletal: Negative for arthralgias, joint swelling and myalgias.   Skin: Negative for rash.   Neurological: Negative for dizziness, weakness, headaches and paresthesias.   Psychiatric/Behavioral: Negative for mood changes. The patient is nervous/anxious.           OBJECTIVE:   /64   Pulse 104   Temp 99.2  F (37.3  C) (Temporal)   Resp 16   Ht 1.6 m (5' 3\")   Wt 77.7 kg (171 lb 6.4 oz)   LMP 12/05/2022 (Approximate)   SpO2 100%   BMI 30.36 kg/m    Physical Exam  GENERAL: healthy, alert and no distress  EYES: Eyes grossly normal to inspection, PERRL and conjunctivae and sclerae normal  HENT: ear canals and TM's normal, nose and mouth without ulcers or lesions  NECK: no adenopathy, no asymmetry, masses, or scars and thyroid normal to palpation  RESP: lungs clear to auscultation - no rales, rhonchi or wheezes  BREAST: normal without masses, tenderness or nipple discharge and no palpable axillary masses or adenopathy  CV: regular rate and rhythm, normal S1 S2, no S3 or S4, no murmur, click or rub, no peripheral edema and peripheral pulses strong  ABDOMEN: soft, nontender, no hepatosplenomegaly, no masses and bowel sounds normal   (female): normal female external genitalia, normal urethral meatus  and normal  adnexae, and uterus without masses.  MS: no gross musculoskeletal defects noted, no edema  SKIN: no suspicious lesions or rashes  NEURO: " Normal strength and tone, mentation intact and speech normal  PSYCH: mentation appears normal, anxious, judgement and insight intact and appearance well groomed    Diagnostic Test Results:  Labs reviewed in Epic  Results for orders placed or performed in visit on 12/19/22   Lipid panel reflex to direct LDL Non-fasting     Status: None   Result Value Ref Range    Cholesterol 133 <200 mg/dL    Triglycerides 74 <150 mg/dL    Direct Measure HDL 53 >=50 mg/dL    LDL Cholesterol Calculated 65 <=100 mg/dL    Non HDL Cholesterol 80 <130 mg/dL    Patient Fasting > 8hrs? No     Narrative    Cholesterol  Desirable:  <200 mg/dL    Triglycerides  Normal:  Less than 150 mg/dL  Borderline High:  150-199 mg/dL  High:  200-499 mg/dL  Very High:  Greater than or equal to 500 mg/dL    Direct Measure HDL  Female:  Greater than or equal to 50 mg/dL   Male:  Greater than or equal to 40 mg/dL    LDL Cholesterol  Desirable:  <100mg/dL  Above Desirable:  100-129 mg/dL   Borderline High:  130-159 mg/dL   High:  160-189 mg/dL   Very High:  >= 190 mg/dL    Non HDL Cholesterol  Desirable:  130 mg/dL  Above Desirable:  130-159 mg/dL  Borderline High:  160-189 mg/dL  High:  190-219 mg/dL  Very High:  Greater than or equal to 220 mg/dL   Hemoglobin A1c     Status: Normal   Result Value Ref Range    Hemoglobin A1C 5.5 0.0 - 5.6 %   TSH with free T4 reflex     Status: Normal   Result Value Ref Range    TSH 1.38 0.40 - 4.00 mU/L   Ferritin     Status: Abnormal   Result Value Ref Range    Ferritin 11 (L) 12 - 150 ng/mL   Iron & Iron Binding Capacity     Status: Normal   Result Value Ref Range    Iron 104 35 - 180 ug/dL    Iron Binding Capacity 403 240 - 430 ug/dL    Iron Sat Index 26 15 - 46 %   CBC with platelets and differential     Status: Abnormal   Result Value Ref Range    WBC Count 6.8 4.0 - 11.0 10e3/uL    RBC Count 5.00 3.80 - 5.20 10e6/uL    Hemoglobin 11.3 (L) 11.7 - 15.7 g/dL    Hematocrit 37.0 35.0 - 47.0 %    MCV 74 (L) 78 - 100 fL     MCH 22.6 (L) 26.5 - 33.0 pg    MCHC 30.5 (L) 31.5 - 36.5 g/dL    RDW 14.3 10.0 - 15.0 %    Platelet Count 351 150 - 450 10e3/uL    % Neutrophils 63 %    % Lymphocytes 27 %    % Monocytes 8 %    % Eosinophils 3 %    % Basophils 0 %    % Immature Granulocytes 0 %    Absolute Neutrophils 4.3 1.6 - 8.3 10e3/uL    Absolute Lymphocytes 1.8 0.8 - 5.3 10e3/uL    Absolute Monocytes 0.6 0.0 - 1.3 10e3/uL    Absolute Eosinophils 0.2 0.0 - 0.7 10e3/uL    Absolute Basophils 0.0 0.0 - 0.2 10e3/uL    Absolute Immature Granulocytes 0.0 <=0.4 10e3/uL   CBC with Platelets & Differential     Status: Abnormal    Narrative    The following orders were created for panel order CBC with Platelets & Differential.  Procedure                               Abnormality         Status                     ---------                               -----------         ------                     CBC with platelets and d...[[id number]]  Abnormal            Final result                 Please view results for these tests on the individual orders.       ASSESSMENT/PLAN:       ICD-10-CM    1. Routine general medical examination at a health care facility  Z00.00 CBC with Platelets & Differential     Lipid panel reflex to direct LDL Non-fasting     Hemoglobin A1c     TSH with free T4 reflex     Ferritin     Iron & Iron Binding Capacity     CBC with Platelets & Differential     Lipid panel reflex to direct LDL Non-fasting     Hemoglobin A1c     TSH with free T4 reflex     Ferritin     Iron & Iron Binding Capacity      2. Heart rate fast  R00.0 Adult Leadless EKG Monitor 8 to 14 Days     EKG 12-lead complete w/read - Clinics     OFFICE/OUTPT VISIT,EST,LEVL IV      3. GARCIA (generalized anxiety disorder)  F41.1 OFFICE/OUTPT VISIT,EST,LEVL IV      4. Dysmenorrhea  N94.6 OFFICE/OUTPT VISIT,EST,LEVL IV          Patient has been advised of split billing requirements and indicates understanding: Yes      COUNSELING:  Reviewed preventive health counseling, as reflected  "in patient instructions       Regular exercise       Healthy diet/nutrition       Immunizations    Vaccinated for: Covid-19             Contraception    EKG- normal.   Palpitations vs. GARCIA or both. Eval for tachycardia, warnign signs discussed. ZIOPATCH for one week.   GARCIA- has this. Advised treatment, discussed options. Patient declines today and will consider.   Eval for TSH or other causes.   Has fatigue, so getting iron studies.   NSAIDS in PMS phase for dysmenorrhea.     BMI:   Estimated body mass index is 30.36 kg/m  as calculated from the following:    Height as of this encounter: 1.6 m (5' 3\").    Weight as of this encounter: 77.7 kg (171 lb 6.4 oz).   Weight management plan: Discussed healthy diet and exercise guidelines      She reports that she has never smoked. She has never used smokeless tobacco.      ESPERANZA Bonilla [facility]ICA  Answers for HPI/ROS submitted by the patient on 12/19/2022  GARCIA 7 TOTAL SCORE: 11      "

## 2022-12-20 ENCOUNTER — MYC MEDICAL ADVICE (OUTPATIENT)
Dept: FAMILY MEDICINE | Facility: CLINIC | Age: 32
End: 2022-12-20

## 2022-12-20 LAB
ANION GAP SERPL CALCULATED.3IONS-SCNC: 5 MMOL/L (ref 3–14)
BUN SERPL-MCNC: 10 MG/DL (ref 7–30)
CALCIUM SERPL-MCNC: 9.4 MG/DL (ref 8.5–10.1)
CHLORIDE BLD-SCNC: 109 MMOL/L (ref 94–109)
CO2 SERPL-SCNC: 27 MMOL/L (ref 20–32)
CREAT SERPL-MCNC: 0.66 MG/DL (ref 0.52–1.04)
GFR SERPL CREATININE-BSD FRML MDRD: >90 ML/MIN/1.73M2
GLUCOSE BLD-MCNC: 74 MG/DL (ref 70–99)
POTASSIUM BLD-SCNC: 4.5 MMOL/L (ref 3.4–5.3)
SODIUM SERPL-SCNC: 141 MMOL/L (ref 133–144)

## 2022-12-22 ENCOUNTER — ANCILLARY PROCEDURE (OUTPATIENT)
Dept: CARDIOLOGY | Facility: CLINIC | Age: 32
End: 2022-12-22
Attending: NURSE PRACTITIONER
Payer: COMMERCIAL

## 2022-12-22 DIAGNOSIS — R00.0 HEART RATE FAST: ICD-10-CM

## 2022-12-22 PROCEDURE — 93248 EXT ECG>7D<15D REV&INTERPJ: CPT | Performed by: INTERNAL MEDICINE

## 2022-12-22 PROCEDURE — 93246 EXT ECG>7D<15D RECORDING: CPT | Performed by: INTERNAL MEDICINE

## 2022-12-22 NOTE — PATIENT INSTRUCTIONS
Patient has been prescribed a ZioPatch holter for 7 days.  Patient was instructed regarding the indication, function, care and prompt return of the ZioPatch holter monitor. The monitor, with S/N [id number],  was placed on the patient with instructions regarding care of the skin, electrodes, and monitor, as well as documentation in the patient diary. Patient demonstrated understanding of this information and agreed to call [employer] with further questions or concerns.

## 2023-01-03 NOTE — RESULT ENCOUNTER NOTE
Foster Tristan,   Your heart results are normal. No exercise restrictions.   Sincerely,   Annie Byrnes DNP

## 2023-01-29 ENCOUNTER — MYC MEDICAL ADVICE (OUTPATIENT)
Dept: FAMILY MEDICINE | Facility: CLINIC | Age: 33
End: 2023-01-29
Payer: COMMERCIAL

## 2023-01-30 ENCOUNTER — DOCUMENTATION ONLY (OUTPATIENT)
Dept: LAB | Facility: CLINIC | Age: 33
End: 2023-01-30

## 2023-01-30 DIAGNOSIS — E61.1 IRON DEFICIENCY: Primary | ICD-10-CM

## 2023-02-01 ENCOUNTER — LAB (OUTPATIENT)
Dept: LAB | Facility: CLINIC | Age: 33
End: 2023-02-01
Payer: COMMERCIAL

## 2023-02-01 DIAGNOSIS — E61.1 IRON DEFICIENCY: ICD-10-CM

## 2023-02-01 LAB
BASOPHILS # BLD AUTO: 0 10E3/UL (ref 0–0.2)
BASOPHILS NFR BLD AUTO: 0 %
EOSINOPHIL # BLD AUTO: 0.2 10E3/UL (ref 0–0.7)
EOSINOPHIL NFR BLD AUTO: 4 %
ERYTHROCYTE [DISTWIDTH] IN BLOOD BY AUTOMATED COUNT: 14.4 % (ref 10–15)
FERRITIN SERPL-MCNC: 12 NG/ML (ref 12–150)
HCT VFR BLD AUTO: 38.7 % (ref 35–47)
HGB BLD-MCNC: 12 G/DL (ref 11.7–15.7)
IMM GRANULOCYTES # BLD: 0 10E3/UL
IMM GRANULOCYTES NFR BLD: 0 %
IRON SATN MFR SERPL: 9 % (ref 15–46)
IRON SERPL-MCNC: 35 UG/DL (ref 35–180)
LYMPHOCYTES # BLD AUTO: 2.2 10E3/UL (ref 0.8–5.3)
LYMPHOCYTES NFR BLD AUTO: 36 %
MCH RBC QN AUTO: 23.6 PG (ref 26.5–33)
MCHC RBC AUTO-ENTMCNC: 31 G/DL (ref 31.5–36.5)
MCV RBC AUTO: 76 FL (ref 78–100)
MONOCYTES # BLD AUTO: 0.4 10E3/UL (ref 0–1.3)
MONOCYTES NFR BLD AUTO: 7 %
NEUTROPHILS # BLD AUTO: 3.3 10E3/UL (ref 1.6–8.3)
NEUTROPHILS NFR BLD AUTO: 54 %
PLATELET # BLD AUTO: 352 10E3/UL (ref 150–450)
RBC # BLD AUTO: 5.09 10E6/UL (ref 3.8–5.2)
TIBC SERPL-MCNC: 397 UG/DL (ref 240–430)
WBC # BLD AUTO: 6.2 10E3/UL (ref 4–11)

## 2023-02-01 PROCEDURE — 83540 ASSAY OF IRON: CPT

## 2023-02-01 PROCEDURE — 85025 COMPLETE CBC W/AUTO DIFF WBC: CPT

## 2023-02-01 PROCEDURE — 82728 ASSAY OF FERRITIN: CPT

## 2023-02-01 PROCEDURE — 36415 COLL VENOUS BLD VENIPUNCTURE: CPT

## 2023-02-01 PROCEDURE — 83550 IRON BINDING TEST: CPT

## 2023-02-19 NOTE — ANESTHESIA POSTPROCEDURE EVALUATION
Patient: Nini Wang    * No procedures listed *    Diagnosis:* No pre-op diagnosis entered *  Diagnosis Additional Information: Labor pain    Anesthesia Type:  Epidural    Note:  Anesthesia Post Evaluation    Patient location during evaluation: Bedside  Patient participation: Able to fully participate in evaluation  Level of consciousness: awake  Pain management: adequate  Airway patency: patent  Cardiovascular status: acceptable  Respiratory status: acceptable  Hydration status: acceptable  PONV: controlled     Anesthetic complications: None    Comments:     S/P epidural for labor.   I or my partner was immediately available for management of this patient during epidural analgesia infusion.  VSS.  Doing well. Block resolved.  Neuro at baseline. Denies positional headache. Minimal side effects easily managed w/ PRN meds. No apparent anesthetic complications. No follow-up required.    Yung Brothers MD        Last vitals:  Vitals:    09/22/17 0036 09/22/17 0332 09/22/17 0800   BP: 101/56 108/50 103/49   Pulse:  73 77   Resp:  16 16   Temp:  98.3  F (36.8  C) 98  F (36.7  C)   SpO2:   100%         Electronically Signed By: Yung Brothers MD  September 22, 2017  12:18 PM   - Cr 1.31 on admission (baseline 0.9 -1.07)   - improving  - hold lasix. encouraged her to liberalized fluid intake for hydration  - continue to hold ACEi  - avoid hypotension  - renally dose meds to GFR, avoid nephrotoxic agents

## 2023-03-02 ENCOUNTER — DOCUMENTATION ONLY (OUTPATIENT)
Dept: LAB | Facility: CLINIC | Age: 33
End: 2023-03-02
Payer: COMMERCIAL

## 2023-03-02 DIAGNOSIS — E61.1 IRON DEFICIENCY: Primary | ICD-10-CM

## 2023-03-02 NOTE — PROGRESS NOTES
Patient is coming in for Iron lab tests, there currently are no orders for this. Please place lab orders for patient as needed.    Thanks,     Emi ALBERTO) [facility]

## 2023-03-10 ENCOUNTER — LAB (OUTPATIENT)
Dept: LAB | Facility: CLINIC | Age: 33
End: 2023-03-10
Payer: COMMERCIAL

## 2023-03-10 DIAGNOSIS — E61.1 IRON DEFICIENCY: ICD-10-CM

## 2023-03-10 LAB — FERRITIN SERPL-MCNC: 13 NG/ML (ref 12–150)

## 2023-03-10 PROCEDURE — 36415 COLL VENOUS BLD VENIPUNCTURE: CPT

## 2023-03-10 PROCEDURE — 82728 ASSAY OF FERRITIN: CPT

## 2023-08-02 ENCOUNTER — DOCUMENTATION ONLY (OUTPATIENT)
Dept: FAMILY MEDICINE | Facility: CLINIC | Age: 33
End: 2023-08-02
Payer: COMMERCIAL

## 2023-08-02 DIAGNOSIS — E61.1 IRON DEFICIENCY: Primary | ICD-10-CM

## 2023-08-08 ENCOUNTER — LAB (OUTPATIENT)
Dept: LAB | Facility: CLINIC | Age: 33
End: 2023-08-08
Payer: COMMERCIAL

## 2023-08-08 DIAGNOSIS — E61.1 IRON DEFICIENCY: ICD-10-CM

## 2023-08-08 LAB
BASOPHILS # BLD AUTO: 0 10E3/UL (ref 0–0.2)
BASOPHILS NFR BLD AUTO: 0 %
EOSINOPHIL # BLD AUTO: 0.3 10E3/UL (ref 0–0.7)
EOSINOPHIL NFR BLD AUTO: 4 %
ERYTHROCYTE [DISTWIDTH] IN BLOOD BY AUTOMATED COUNT: 12.8 % (ref 10–15)
FERRITIN SERPL-MCNC: 44 NG/ML (ref 6–175)
HCT VFR BLD AUTO: 41.8 % (ref 35–47)
HGB BLD-MCNC: 13.2 G/DL (ref 11.7–15.7)
IMM GRANULOCYTES # BLD: 0 10E3/UL
IMM GRANULOCYTES NFR BLD: 0 %
IRON BINDING CAPACITY (ROCHE): 328 UG/DL (ref 240–430)
IRON SATN MFR SERPL: 16 % (ref 15–46)
IRON SERPL-MCNC: 52 UG/DL (ref 37–145)
LYMPHOCYTES # BLD AUTO: 2.3 10E3/UL (ref 0.8–5.3)
LYMPHOCYTES NFR BLD AUTO: 35 %
MCH RBC QN AUTO: 25 PG (ref 26.5–33)
MCHC RBC AUTO-ENTMCNC: 31.6 G/DL (ref 31.5–36.5)
MCV RBC AUTO: 79 FL (ref 78–100)
MONOCYTES # BLD AUTO: 0.5 10E3/UL (ref 0–1.3)
MONOCYTES NFR BLD AUTO: 7 %
NEUTROPHILS # BLD AUTO: 3.6 10E3/UL (ref 1.6–8.3)
NEUTROPHILS NFR BLD AUTO: 54 %
PLATELET # BLD AUTO: 339 10E3/UL (ref 150–450)
RBC # BLD AUTO: 5.28 10E6/UL (ref 3.8–5.2)
WBC # BLD AUTO: 6.7 10E3/UL (ref 4–11)

## 2023-08-08 PROCEDURE — 83550 IRON BINDING TEST: CPT

## 2023-08-08 PROCEDURE — 36415 COLL VENOUS BLD VENIPUNCTURE: CPT

## 2023-08-08 PROCEDURE — 82728 ASSAY OF FERRITIN: CPT

## 2023-08-08 PROCEDURE — 83540 ASSAY OF IRON: CPT

## 2023-08-08 PROCEDURE — 85025 COMPLETE CBC W/AUTO DIFF WBC: CPT

## 2023-08-14 ENCOUNTER — TELEPHONE (OUTPATIENT)
Dept: FAMILY MEDICINE | Facility: CLINIC | Age: 33
End: 2023-08-14
Payer: COMMERCIAL

## 2023-08-14 NOTE — TELEPHONE ENCOUNTER
----- Message from Iona Gregg MD sent at 8/13/2023  6:32 AM CDT -----  Team - please call patient with results;    No telephone call was made and PCP.    Seen by patient Azalea Wang on 8/8/2023  5:17 PM     Closing encounter   Iona Gregg MD  8/8/2023  3:58 PM CDT     Please schedule telephone visit to discuss labs and plan of care.       Dear Azalea,  Here are your recent results. Annie Byrnes is out of the office this week. Your ferritin and iron results have come back within normal limits. We schedule a telephone visit with Annie so she can address if iron supplements is still needed at this time.     Iona rGegg MD   Written by Iona Gregg MD on 8/8/2023  3:58 PM CDT

## 2023-08-14 NOTE — TELEPHONE ENCOUNTER
Left message for patient to return call. When call is returned, please see message below and assist patient in scheduling follow up visit with PCP regarding lab results.

## 2023-08-16 NOTE — TELEPHONE ENCOUNTER
CBC with platelets and differential: Patient Communication     Append Comments   Seen    Dear Azalea,  Here are your recent results. Annie Byrnes is out of the office this week. Your ferritin and iron results have come back within normal limits. We schedule a telephone visit with Annie so she can address if iron supplements is still needed at this time.     Iona Gregg MD   Written by Iona Gregg MD on 8/8/2023  3:58 PM CDT  Seen by patient Azalea Wang on 8/8/2023  5:17 PM

## 2023-08-29 ENCOUNTER — VIRTUAL VISIT (OUTPATIENT)
Dept: FAMILY MEDICINE | Facility: CLINIC | Age: 33
End: 2023-08-29
Payer: COMMERCIAL

## 2023-08-29 DIAGNOSIS — N94.3 PMS (PREMENSTRUAL SYNDROME): Primary | ICD-10-CM

## 2023-08-29 PROCEDURE — 99213 OFFICE O/P EST LOW 20 MIN: CPT | Mod: 95 | Performed by: NURSE PRACTITIONER

## 2023-08-29 NOTE — PROGRESS NOTES
"Azalea is a 33 year old who is being evaluated via a billable telephone visit.      What phone number would you like to be contacted at? [phone]  How would you like to obtain your AVS? Amber    Distant Location (provider location):  Off-site    Assessment & Plan     PMS (premenstrual syndrome)      HX of iron deficiency- resolved  Discussed maintenance dosing of MVI or iron supplement- routinely, but not needing daily, likely.  Re-check iron stores on maintenance regimen at [facility].   Discussed hormones and PMS- pt. Wanting to do home plan.   Exercise and NSAIDS in PMS timeframe advised.   Return to clinic with any new or worsening symptoms, and as needed.              BMI:   Estimated body mass index is 30.36 kg/m  as calculated from the following:    Height as of 12/19/22: 1.6 m (5' 3\").    Weight as of 12/19/22: 77.7 kg (171 lb 6.4 oz).   Weight management plan: discussed exercise        ESPERANZA Bonilla CNP  [facility] COTO    Niurka Tristan is a 33 year old, presenting for the following health issues:  Lab Result Notice (Iron)        8/29/2023     6:46 AM   Additional Questions   Roomed by Sherman FIGUEROA   Accompanied by Self         8/29/2023     6:46 AM   Patient Reported Additional Medications   Patient reports taking the following new medications None       History of Present Illness       Reason for visit:  Iron follow up    She eats 2-3 servings of fruits and vegetables daily.She consumes 1 sweetened beverage(s) daily.She exercises with enough effort to increase her heart rate 30 to 60 minutes per day.  She exercises with enough effort to increase her heart rate 6 days per week.   She is taking medications regularly.     PMS symptoms, and heavy menses first 1-2 days- normal. + acne, occ. Fatigue, periods are regular- wondering if she is at baseline since her missed AB last year with her cycles.   Wanting to avoid hormone meds.       Got pregnant on failed vasectomy, and  had a " second procedure and they are no longer wanting more children.         On MVI- daily and iron supplement every other day.   Iron stores are back to normal.     Feeling less fatigue.   Stools are normal.     Periods are heavy still at times.   Not wanting hormone treatment.   Some PMS symptoms with this.           Review of Systems   Constitutional, HEENT, cardiovascular, pulmonary, gi and gu systems are negative, except as otherwise noted.      Objective           Vitals:  No vitals were obtained today due to virtual visit.    Physical Exam   healthy, alert, and no distress  PSYCH: Alert and oriented times 3; coherent speech, normal   rate and volume, able to articulate logical thoughts, able   to abstract reason, no tangential thoughts, no hallucinations   or delusions  Her affect is normal  RESP: No cough, no audible wheezing, able to talk in full sentences  Remainder of exam unable to be completed due to telephone visits                Phone call duration: 13 minutes

## 2023-12-18 ASSESSMENT — ENCOUNTER SYMPTOMS
PARESTHESIAS: 0
FEVER: 0
NERVOUS/ANXIOUS: 1
DYSURIA: 0
SORE THROAT: 0
JOINT SWELLING: 0
CONSTIPATION: 0
HEMATOCHEZIA: 0
BREAST MASS: 0
DIZZINESS: 0
EYE PAIN: 0
WEAKNESS: 0
COUGH: 0
PALPITATIONS: 0
FREQUENCY: 0
HEADACHES: 0
CHILLS: 0
MYALGIAS: 0
ABDOMINAL PAIN: 0
HEMATURIA: 0
SHORTNESS OF BREATH: 0
HEARTBURN: 0
DIARRHEA: 0
NAUSEA: 0
ARTHRALGIAS: 0

## 2023-12-19 ENCOUNTER — MYC MEDICAL ADVICE (OUTPATIENT)
Dept: FAMILY MEDICINE | Facility: CLINIC | Age: 33
End: 2023-12-19
Payer: COMMERCIAL

## 2023-12-21 ENCOUNTER — OFFICE VISIT (OUTPATIENT)
Dept: FAMILY MEDICINE | Facility: CLINIC | Age: 33
End: 2023-12-21
Payer: COMMERCIAL

## 2023-12-21 VITALS
TEMPERATURE: 98.1 F | HEIGHT: 64 IN | RESPIRATION RATE: 16 BRPM | OXYGEN SATURATION: 100 % | BODY MASS INDEX: 29.42 KG/M2 | HEART RATE: 92 BPM | WEIGHT: 172.31 LBS | SYSTOLIC BLOOD PRESSURE: 114 MMHG | DIASTOLIC BLOOD PRESSURE: 62 MMHG

## 2023-12-21 DIAGNOSIS — Z00.00 ROUTINE GENERAL MEDICAL EXAMINATION AT A HEALTH CARE FACILITY: Primary | ICD-10-CM

## 2023-12-21 DIAGNOSIS — K62.89 RECTAL IRRITATION: ICD-10-CM

## 2023-12-21 DIAGNOSIS — K64.4 EXTERNAL HEMORRHOIDS: ICD-10-CM

## 2023-12-21 DIAGNOSIS — F41.1 GAD (GENERALIZED ANXIETY DISORDER): ICD-10-CM

## 2023-12-21 DIAGNOSIS — E61.1 IRON DEFICIENCY: ICD-10-CM

## 2023-12-21 LAB
BASOPHILS # BLD AUTO: 0 10E3/UL (ref 0–0.2)
BASOPHILS NFR BLD AUTO: 0 %
CHOLEST SERPL-MCNC: 128 MG/DL
EOSINOPHIL # BLD AUTO: 0.1 10E3/UL (ref 0–0.7)
EOSINOPHIL NFR BLD AUTO: 2 %
ERYTHROCYTE [DISTWIDTH] IN BLOOD BY AUTOMATED COUNT: 12.5 % (ref 10–15)
FASTING STATUS PATIENT QL REPORTED: YES
FERRITIN SERPL-MCNC: 49 NG/ML (ref 6–175)
HBA1C MFR BLD: 5.2 % (ref 0–5.6)
HCT VFR BLD AUTO: 39.8 % (ref 35–47)
HDLC SERPL-MCNC: 46 MG/DL
HGB BLD-MCNC: 12.9 G/DL (ref 11.7–15.7)
IMM GRANULOCYTES # BLD: 0 10E3/UL
IMM GRANULOCYTES NFR BLD: 0 %
IRON BINDING CAPACITY (ROCHE): 333 UG/DL (ref 240–430)
IRON SATN MFR SERPL: 36 % (ref 15–46)
IRON SERPL-MCNC: 119 UG/DL (ref 37–145)
LDLC SERPL CALC-MCNC: 66 MG/DL
LYMPHOCYTES # BLD AUTO: 1.6 10E3/UL (ref 0.8–5.3)
LYMPHOCYTES NFR BLD AUTO: 27 %
MCH RBC QN AUTO: 25.5 PG (ref 26.5–33)
MCHC RBC AUTO-ENTMCNC: 32.4 G/DL (ref 31.5–36.5)
MCV RBC AUTO: 79 FL (ref 78–100)
MONOCYTES # BLD AUTO: 0.4 10E3/UL (ref 0–1.3)
MONOCYTES NFR BLD AUTO: 7 %
NEUTROPHILS # BLD AUTO: 3.9 10E3/UL (ref 1.6–8.3)
NEUTROPHILS NFR BLD AUTO: 64 %
NONHDLC SERPL-MCNC: 82 MG/DL
PLATELET # BLD AUTO: 357 10E3/UL (ref 150–450)
RBC # BLD AUTO: 5.06 10E6/UL (ref 3.8–5.2)
TRIGL SERPL-MCNC: 80 MG/DL
TSH SERPL DL<=0.005 MIU/L-ACNC: 1.38 UIU/ML (ref 0.3–4.2)
WBC # BLD AUTO: 6.1 10E3/UL (ref 4–11)

## 2023-12-21 PROCEDURE — 90686 IIV4 VACC NO PRSV 0.5 ML IM: CPT | Performed by: NURSE PRACTITIONER

## 2023-12-21 PROCEDURE — 83036 HEMOGLOBIN GLYCOSYLATED A1C: CPT | Performed by: NURSE PRACTITIONER

## 2023-12-21 PROCEDURE — 83550 IRON BINDING TEST: CPT | Performed by: NURSE PRACTITIONER

## 2023-12-21 PROCEDURE — 99395 PREV VISIT EST AGE 18-39: CPT | Mod: 25 | Performed by: NURSE PRACTITIONER

## 2023-12-21 PROCEDURE — 84443 ASSAY THYROID STIM HORMONE: CPT | Performed by: NURSE PRACTITIONER

## 2023-12-21 PROCEDURE — 83540 ASSAY OF IRON: CPT | Performed by: NURSE PRACTITIONER

## 2023-12-21 PROCEDURE — 85025 COMPLETE CBC W/AUTO DIFF WBC: CPT | Performed by: NURSE PRACTITIONER

## 2023-12-21 PROCEDURE — 90471 IMMUNIZATION ADMIN: CPT | Performed by: NURSE PRACTITIONER

## 2023-12-21 PROCEDURE — 36415 COLL VENOUS BLD VENIPUNCTURE: CPT | Performed by: NURSE PRACTITIONER

## 2023-12-21 PROCEDURE — 80061 LIPID PANEL: CPT | Performed by: NURSE PRACTITIONER

## 2023-12-21 PROCEDURE — 82728 ASSAY OF FERRITIN: CPT | Performed by: NURSE PRACTITIONER

## 2023-12-21 PROCEDURE — 99214 OFFICE O/P EST MOD 30 MIN: CPT | Mod: 25 | Performed by: NURSE PRACTITIONER

## 2023-12-21 ASSESSMENT — ENCOUNTER SYMPTOMS
SORE THROAT: 0
HEARTBURN: 0
HEADACHES: 0
DIARRHEA: 0
NAUSEA: 0
EYE PAIN: 0
FEVER: 0
MYALGIAS: 0
BREAST MASS: 0
ABDOMINAL PAIN: 0
DYSURIA: 0
SHORTNESS OF BREATH: 0
HEMATURIA: 0
DIZZINESS: 0
FREQUENCY: 0
NERVOUS/ANXIOUS: 1
CHILLS: 0
PARESTHESIAS: 0
ARTHRALGIAS: 0
PALPITATIONS: 0
CONSTIPATION: 0
HEMATOCHEZIA: 0
WEAKNESS: 0
COUGH: 0
JOINT SWELLING: 0

## 2023-12-21 ASSESSMENT — ANXIETY QUESTIONNAIRES
4. TROUBLE RELAXING: SEVERAL DAYS
3. WORRYING TOO MUCH ABOUT DIFFERENT THINGS: SEVERAL DAYS
2. NOT BEING ABLE TO STOP OR CONTROL WORRYING: SEVERAL DAYS
GAD7 TOTAL SCORE: 7
6. BECOMING EASILY ANNOYED OR IRRITABLE: SEVERAL DAYS
5. BEING SO RESTLESS THAT IT IS HARD TO SIT STILL: SEVERAL DAYS
1. FEELING NERVOUS, ANXIOUS, OR ON EDGE: SEVERAL DAYS
IF YOU CHECKED OFF ANY PROBLEMS ON THIS QUESTIONNAIRE, HOW DIFFICULT HAVE THESE PROBLEMS MADE IT FOR YOU TO DO YOUR WORK, TAKE CARE OF THINGS AT HOME, OR GET ALONG WITH OTHER PEOPLE: SOMEWHAT DIFFICULT
7. FEELING AFRAID AS IF SOMETHING AWFUL MIGHT HAPPEN: SEVERAL DAYS
GAD7 TOTAL SCORE: 7

## 2023-12-21 ASSESSMENT — PAIN SCALES - GENERAL: PAINLEVEL: NO PAIN (0)

## 2023-12-21 NOTE — RESULT ENCOUNTER NOTE
Azaela,  Your labs that have returned are normal. More labs are still processing. Annie Byrnes, DNP

## 2023-12-21 NOTE — PROGRESS NOTES
SUBJECTIVE:   Azalea is a 33 year old, presenting for the following:  Physical        12/21/2023     9:11 AM   Additional Questions   Roomed by EMELI       Healthy Habits:     Getting at least 3 servings of Calcium per day:  Yes    Bi-annual eye exam:  NO    Dental care twice a year:  Yes    Sleep apnea or symptoms of sleep apnea:  None    Diet:  Regular (no restrictions)    Frequency of exercise:  6-7 days/week    Duration of exercise:  30-45 minutes    Taking medications regularly:  Yes    Medication side effects:  None    Additional concerns today:  Yes    Hx of iron deficiency- would like her levels checked.   Was on iron supplementation, but has cut back.     Rectal irritation from hemorrhoids. [place] stool scale is a 4, does nto feel pain with any type of thrombus, more stinging with irritation.   OTC steroid cream is not helpful.         Social History     Tobacco Use    Smoking status: Never     Passive exposure: Never    Smokeless tobacco: Never   Substance Use Topics    Alcohol use: Yes     Comment: occ-not in [facility]             12/18/2023     2:51 PM   Alcohol Use   Prescreen: >3 drinks/day or >7 drinks/week? No       Reviewed orders with patient.  Reviewed health maintenance and updated orders accordingly - Yes    Lab work is in process    Breast Cancer Screening:    FHS-7:       7/3/2021     4:27 PM   Breast CA Risk Assessment (FHS-7)   Did any of your first-degree relatives have breast or ovarian cancer? Yes   Did any of your relatives have bilateral breast cancer? Unknown   Did any man in your family have breast cancer? No   Did any woman in your family have breast and ovarian cancer? Yes   Did any woman in your family have breast cancer before age 50 y? Yes   Do you have 2 or more relatives with breast and/or ovarian cancer? Yes   Do you have 2 or more relatives with breast and/or bowel cancer? Yes     Pt. Does not feel her family cancers were genetically putting her at risk, she will verify her history  "and reach out if wanting genetic testing.     Patient under 40 years of age: Routine Mammogram Screening not recommended.   Pertinent mammograms are reviewed under the imaging tab.    History of abnormal Pap smear: NO - age 30-65 PAP every 5 years with negative HPV co-testing recommended      Latest Ref Rng & Units 7/6/2021     5:51 PM 7/6/2021     5:50 PM 11/7/2018     2:13 PM   PAP / HPV   PAP (Historical)  NIL   NIL    HPV 16 DNA NEG^Negative  Negative     HPV 18 DNA NEG^Negative  Negative     Other HR HPV NEG^Negative  Negative       Reviewed and updated as needed this visit by clinical staff   Tobacco  Allergies  Meds              Reviewed and updated as needed this visit by Provider                     Review of Systems   Constitutional:  Negative for chills and fever.   HENT:  Negative for congestion, ear pain, hearing loss and sore throat.    Eyes:  Negative for pain and visual disturbance.   Respiratory:  Negative for cough and shortness of breath.    Cardiovascular:  Negative for chest pain, palpitations and peripheral edema.   Gastrointestinal:  Negative for abdominal pain, constipation, diarrhea, heartburn, hematochezia and nausea.   Breasts:  Negative for tenderness, breast mass and discharge.   Genitourinary:  Negative for dysuria, frequency, genital sores, hematuria, pelvic pain, urgency, vaginal bleeding and vaginal discharge.   Musculoskeletal:  Negative for arthralgias, joint swelling and myalgias.   Skin:  Negative for rash.   Neurological:  Negative for dizziness, weakness, headaches and paresthesias.   Psychiatric/Behavioral:  Negative for mood changes. The patient is nervous/anxious.      Open to trial for mood improvement. Occ. Racing thoughts at night. Does not seem to be a trigger to her anxiety.        OBJECTIVE:   /62 (BP Location: Left arm, Patient Position: Chair, Cuff Size: Adult Regular)   Pulse 92   Temp 98.1  F (36.7  C) (Temporal)   Resp 16   Ht 1.619 m (5' 3.75\")   Wt " 78.2 kg (172 lb 5 oz)   LMP 12/20/2023 (Exact Date)   SpO2 100%   Breastfeeding No   BMI 29.81 kg/m    Physical Exam  GENERAL: healthy, alert and no distress  EYES: Eyes grossly normal to inspection, PERRL and conjunctivae and sclerae normal  HENT: ear canals and TM's normal, nose and mouth without ulcers or lesions  NECK: no adenopathy, no asymmetry, masses, or scars and thyroid normal to palpation  RESP: lungs clear to auscultation - no rales, rhonchi or wheezes  BREAST: normal without masses, tenderness or nipple discharge and no palpable axillary masses or adenopathy  CV: regular rate and rhythm, normal S1 S2, no S3 or S4, no murmur, click or rub, no peripheral edema and peripheral pulses strong  ABDOMEN: soft, nontender, no hepatosplenomegaly, no masses and bowel sounds normal  RECTAL: normal sphincter tone, no rectal masses and mild irritated hemorrhoid- if in lithotomy position-  hemorrhoid a bit irritated without fissure at 4-5 o'clock position  MS: no gross musculoskeletal defects noted, no edema  SKIN: no suspicious lesions or rashes  NEURO: Normal strength and tone, mentation intact and speech normal  PSYCH: mentation appears normal, affect normal/bright        ASSESSMENT/PLAN:       ICD-10-CM    1. Routine general medical examination at a health care facility  Z00.00 CBC with Platelets & Differential     Lipid panel reflex to direct LDL Fasting     Hemoglobin A1c     TSH with free T4 reflex     TSH with free T4 reflex     Hemoglobin A1c     Lipid panel reflex to direct LDL Fasting     CBC with Platelets & Differential      2. Iron deficiency  E61.1 Ferritin     Iron & Iron Binding Capacity     Iron & Iron Binding Capacity     Ferritin      3. GARCIA (generalized anxiety disorder)  F41.1 sertraline (ZOLOFT) 50 MG tablet      4. Rectal irritation  K62.89 diltiazem 2% 2% OINT ointment     DISCONTINUED: diltiazem 2% 2% OINT ointment      5. External hemorrhoids  K64.4           Patient has been advised of  split billing requirements and indicates understanding: Yes      COUNSELING:  Reviewed preventive health counseling, as reflected in patient instructions       Regular exercise       Healthy diet/nutrition       Immunizations  Vaccinated for: Influenza           Contraception       -  with second vasectomy.     Weight mgmt-    GAD_ starting zoloft- taper up to 50 mg. Re-check in 3 months. Healthy habits.     Rectal irritation, trial of diltiazem cream prn.       Updating ferritin studies.       She reports that she has never smoked. She has never been exposed to tobacco smoke. She has never used smokeless tobacco.          [facility] Bonilla [facility]      Answers submitted by the patient for this visit:  GARCIA-7 (Submitted on 12/21/2023)  GARCIA 7 TOTAL SCORE: 7

## 2023-12-22 ENCOUNTER — MYC MEDICAL ADVICE (OUTPATIENT)
Dept: FAMILY MEDICINE | Facility: CLINIC | Age: 33
End: 2023-12-22
Payer: COMMERCIAL

## 2023-12-22 DIAGNOSIS — K62.89 RECTAL IRRITATION: ICD-10-CM

## 2023-12-22 DIAGNOSIS — Z80.3 FAMILY HISTORY OF MALIGNANT NEOPLASM OF BREAST: Primary | ICD-10-CM

## 2024-01-02 ENCOUNTER — PATIENT OUTREACH (OUTPATIENT)
Dept: ONCOLOGY | Facility: CLINIC | Age: 34
End: 2024-01-02
Payer: COMMERCIAL

## 2024-04-19 ENCOUNTER — TRANSFERRED RECORDS (OUTPATIENT)
Dept: HEALTH INFORMATION MANAGEMENT | Facility: CLINIC | Age: 34
End: 2024-04-19
Payer: COMMERCIAL

## 2024-04-23 ENCOUNTER — TRANSFERRED RECORDS (OUTPATIENT)
Dept: HEALTH INFORMATION MANAGEMENT | Facility: CLINIC | Age: 34
End: 2024-04-23
Payer: COMMERCIAL

## 2024-05-03 ENCOUNTER — TRANSFERRED RECORDS (OUTPATIENT)
Dept: HEALTH INFORMATION MANAGEMENT | Facility: CLINIC | Age: 34
End: 2024-05-03
Payer: COMMERCIAL

## 2024-06-03 ENCOUNTER — TRANSFERRED RECORDS (OUTPATIENT)
Dept: HEALTH INFORMATION MANAGEMENT | Facility: CLINIC | Age: 34
End: 2024-06-03

## 2024-06-27 ENCOUNTER — MYC REFILL (OUTPATIENT)
Dept: FAMILY MEDICINE | Facility: CLINIC | Age: 34
End: 2024-06-27
Payer: COMMERCIAL

## 2024-06-27 ENCOUNTER — MYC MEDICAL ADVICE (OUTPATIENT)
Dept: FAMILY MEDICINE | Facility: CLINIC | Age: 34
End: 2024-06-27
Payer: COMMERCIAL

## 2024-06-27 DIAGNOSIS — F41.1 GAD (GENERALIZED ANXIETY DISORDER): ICD-10-CM

## 2024-06-27 NOTE — TELEPHONE ENCOUNTER
RN Triage    Patient Contact    Attempt # 1    Was call answered?  No.  Left message on voicemail with information to call me back.      Upon call back review 3 month gap of medication and now requesting refill for zoloft. PHQ-7 done 6/27/24    Lisa Bowie RN  [facility] - Registered Nurse  Clinic Triage [place]   June 27, 2024

## 2024-06-28 ENCOUNTER — TRANSFERRED RECORDS (OUTPATIENT)
Dept: HEALTH INFORMATION MANAGEMENT | Facility: CLINIC | Age: 34
End: 2024-06-28
Payer: COMMERCIAL

## 2024-06-28 NOTE — TELEPHONE ENCOUNTER
RN Triage    Patient Contact    Attempt # 2    Was call answered?  No.  Left message on voicemail with information to call me back.      Upon call back review 3 month gap of medication and now requesting refill for zoloft. PHQ-7 done 6/27/24    Balbina Strong RN on 6/28/2024 at 9:50 AM

## 2024-06-28 NOTE — TELEPHONE ENCOUNTER
Patient called after message left to clarify the gap in medication refill time.  Please see My C Refill encounter dated 6/28/2024.    Sunitha Lyman RN

## 2024-06-28 NOTE — TELEPHONE ENCOUNTER
Patient phoned back.  Patient stated at her office visit, PCP advised her to cut zoloft tablets in half (50 mg tablets cutting in half to = 25 mg daily) and she could increase to a full tablet if feeling the need to try and increase the medication as she was getting used to the dose.    Notes per 12/21/2023 state:  GAD_ starting zoloft- taper up to 50 mg. Re-check in 3 months. Healthy habits.     Patient stated she does not feel the need to increase the tablet up to 50 mg at this time and has been taking 1/2 tablet (25 mg) of zoloft daily, that is why it looks like there may be a gap.    Patient stated she is doing well on 25 mg daily.    Prescription approved per [facility] Refill Protocol.  Sunitha Lyman RN

## 2024-07-10 ENCOUNTER — TRANSFERRED RECORDS (OUTPATIENT)
Dept: HEALTH INFORMATION MANAGEMENT | Facility: CLINIC | Age: 34
End: 2024-07-10
Payer: COMMERCIAL

## 2024-09-05 ENCOUNTER — TRANSFERRED RECORDS (OUTPATIENT)
Dept: HEALTH INFORMATION MANAGEMENT | Facility: CLINIC | Age: 34
End: 2024-09-05
Payer: COMMERCIAL

## 2024-09-10 ENCOUNTER — TRANSFERRED RECORDS (OUTPATIENT)
Dept: HEALTH INFORMATION MANAGEMENT | Facility: CLINIC | Age: 34
End: 2024-09-10
Payer: COMMERCIAL

## 2024-09-20 ENCOUNTER — OFFICE VISIT (OUTPATIENT)
Dept: FAMILY MEDICINE | Facility: CLINIC | Age: 34
End: 2024-09-20
Payer: COMMERCIAL

## 2024-09-20 ENCOUNTER — TELEPHONE (OUTPATIENT)
Dept: FAMILY MEDICINE | Facility: OTHER | Age: 34
End: 2024-09-20

## 2024-09-20 VITALS
BODY MASS INDEX: 30.81 KG/M2 | WEIGHT: 180.5 LBS | TEMPERATURE: 97.3 F | SYSTOLIC BLOOD PRESSURE: 115 MMHG | HEART RATE: 69 BPM | RESPIRATION RATE: 14 BRPM | DIASTOLIC BLOOD PRESSURE: 62 MMHG | HEIGHT: 64 IN | OXYGEN SATURATION: 100 %

## 2024-09-20 DIAGNOSIS — J34.2 DEVIATED NASAL SEPTUM: Primary | ICD-10-CM

## 2024-09-20 DIAGNOSIS — H69.93 DYSFUNCTION OF BOTH EUSTACHIAN TUBES: ICD-10-CM

## 2024-09-20 DIAGNOSIS — Z01.818 PREOP GENERAL PHYSICAL EXAM: ICD-10-CM

## 2024-09-20 DIAGNOSIS — F41.1 GAD (GENERALIZED ANXIETY DISORDER): ICD-10-CM

## 2024-09-20 LAB
ANION GAP SERPL CALCULATED.3IONS-SCNC: 11 MMOL/L (ref 7–15)
BUN SERPL-MCNC: 7.5 MG/DL (ref 6–20)
CALCIUM SERPL-MCNC: 9.3 MG/DL (ref 8.8–10.4)
CHLORIDE SERPL-SCNC: 104 MMOL/L (ref 98–107)
CREAT SERPL-MCNC: 0.78 MG/DL (ref 0.51–0.95)
EGFRCR SERPLBLD CKD-EPI 2021: >90 ML/MIN/1.73M2
ERYTHROCYTE [DISTWIDTH] IN BLOOD BY AUTOMATED COUNT: 13.1 % (ref 10–15)
GLUCOSE SERPL-MCNC: 68 MG/DL (ref 70–99)
HCO3 SERPL-SCNC: 26 MMOL/L (ref 22–29)
HCT VFR BLD AUTO: 39.3 % (ref 35–47)
HGB BLD-MCNC: 12.4 G/DL (ref 11.7–15.7)
MCH RBC QN AUTO: 24.9 PG (ref 26.5–33)
MCHC RBC AUTO-ENTMCNC: 31.6 G/DL (ref 31.5–36.5)
MCV RBC AUTO: 79 FL (ref 78–100)
PLATELET # BLD AUTO: 343 10E3/UL (ref 150–450)
POTASSIUM SERPL-SCNC: 4.2 MMOL/L (ref 3.4–5.3)
RBC # BLD AUTO: 4.97 10E6/UL (ref 3.8–5.2)
SODIUM SERPL-SCNC: 141 MMOL/L (ref 135–145)
WBC # BLD AUTO: 5.6 10E3/UL (ref 4–11)

## 2024-09-20 PROCEDURE — 99213 OFFICE O/P EST LOW 20 MIN: CPT

## 2024-09-20 PROCEDURE — 36415 COLL VENOUS BLD VENIPUNCTURE: CPT

## 2024-09-20 PROCEDURE — 96127 BRIEF EMOTIONAL/BEHAV ASSMT: CPT

## 2024-09-20 PROCEDURE — 85027 COMPLETE CBC AUTOMATED: CPT

## 2024-09-20 PROCEDURE — 80048 BASIC METABOLIC PNL TOTAL CA: CPT

## 2024-09-20 RX ORDER — FLUTICASONE PROPIONATE 50 MCG
2 SPRAY, SUSPENSION (ML) NASAL DAILY
COMMUNITY
Start: 2024-06-04

## 2024-09-20 ASSESSMENT — ANXIETY QUESTIONNAIRES
8. IF YOU CHECKED OFF ANY PROBLEMS, HOW DIFFICULT HAVE THESE MADE IT FOR YOU TO DO YOUR WORK, TAKE CARE OF THINGS AT HOME, OR GET ALONG WITH OTHER PEOPLE?: SOMEWHAT DIFFICULT
7. FEELING AFRAID AS IF SOMETHING AWFUL MIGHT HAPPEN: SEVERAL DAYS
GAD7 TOTAL SCORE: 4

## 2024-09-20 ASSESSMENT — PAIN SCALES - GENERAL: PAINLEVEL: NO PAIN (0)

## 2024-09-20 NOTE — PATIENT INSTRUCTIONS

## 2024-09-20 NOTE — PROGRESS NOTES
Preoperative Evaluation  [facility] [place]  [address], [address]  [place] [address]  Phone: [phone]  Fax: [phone]  Primary Provider: ESPERANZA Bonilla CNP  Pre-op Performing Provider: ESPERANZA Dave CNP  Sep 20, 2024             9/19/2024   Surgical Information   What procedure is being done? SEPTOPLASTY, BILATERAL SUBMUCOUS RESECTION TURBINATES, LEFT EXCISION INGRID BULLOSA, ENDOSCOPIC BILATERAL ANTROSTOMY, BILATERAL ANTERIOR ETHMOIDECTOMY, OPTICAL TRACKING    Facility or Hospital where procedure/surgery will be performed: [facility]   Who is doing the procedure / surgery? Dr Tan   Date of surgery / procedure: 9/27/2024   Time of surgery / procedure: 1:30   Where do you plan to recover after surgery? at home with family      Fax number for surgical facility: [facility] Fax #: [phone]    Assessment & Plan     The proposed surgical procedure is considered INTERMEDIATE risk.    Preop general physical exam  Patient is a 34 y ear-old female with GARCIA presenting for preoperative physical exam. Meets 4 METS. Preoperative labs completed. No EKG required, no history of coronary heart disease, significant arrhythmia, peripheral arterial disease or other structural heart disease. Discussed medication that require holding prior to procedure. Patient is approved for above procedure.     Deviated nasal septum  Following with ENT, plan for above procedure.   - CBC with platelets; Future  - Basic metabolic panel  (Ca, Cl, CO2, Creat, Gluc, K, Na, BUN); Future    Dysfunction of both eustachian tubes  Following with ENT, plan for above procedure.   - CBC with platelets; Future  - Basic metabolic panel  (Ca, Cl, CO2, Creat, Gluc, K, Na, BUN); Future            - No identified additional risk factors other than previously addressed    Preoperative Medication Instructions  Antiplatelet or Anticoagulation Medication Instructions   - Patient is on no antiplatelet  or anticoagulation medications.    Additional Medication Instructions   - Herbal medications and vitamins: DO NOT TAKE 14 days prior to surgery.   - ibuprofen (Advil, Motrin): DO NOT TAKE 1 day before surgery.    - naproxen (Aleve, Naprosyn): DO NOT TAKE 4 days before surgery.    - SSRIs, SNRIs, TCAs, Antipsychotics: Continue without modification.    - Topicals: DO NOT TAKE day of surgery.    Recommendation  Approval given to proceed with proposed procedure pending review of diagnostic evaluation.    Niurka Tritsan is a 34 year old, presenting for the following:  Pre-Op Exam        9/20/2024     8:10 AM   Additional Questions   Roomed by AG   Accompanied by self         9/20/2024   Forms   Any forms needing to be completed Yes        HPI related to upcoming procedure: bilateral eustachian tube dysfunction         9/19/2024   Pre-Op Questionnaire   Have you ever had a heart attack or stroke? No   Have you ever had surgery on your heart or blood vessels, such as a stent placement, a coronary artery bypass, or surgery on an artery in your head, neck, heart, or legs? No   Do you have chest pain with activity? No   Do you have a history of heart failure? No   Do you currently have a cold, bronchitis or symptoms of other infection? No   Do you have a cough, shortness of breath, or wheezing? No   Do you or anyone in your family have previous history of blood clots? No   Do you or does anyone in your family have a serious bleeding problem such as prolonged bleeding following surgeries or cuts? No   Have you ever had problems with anemia or been told to take iron pills? (!) YES- following miscarriage    Have you had any abnormal blood loss such as black, tarry or bloody stools, or abnormal vaginal bleeding? No   Have you ever had a blood transfusion? No   Are you willing to have a blood transfusion if it is medically needed before, during, or after your surgery? Yes   Have you or any of your relatives ever had problems  with anesthesia? No   Do you have sleep apnea, excessive snoring or daytime drowsiness? No   Do you have any artifical heart valves or other implanted medical devices like a pacemaker, defibrillator, or continuous glucose monitor? No   Do you have artificial joints? No   Are you allergic to latex? No      Health Care Directive  Patient does not have a Health Care Directive or Living Will: Discussed advance care planning with patient; however, patient declined at this time.    Preoperative Review of    reviewed - no record of controlled substances prescribed.      Status of Chronic Conditions:  DEPRESSION - Patient has a long history of Depression of moderate severity requiring medication for control with recent symptoms being stable..Current symptoms of depression include none.     There are no problems to display for this patient.     Past Medical History:   Diagnosis Date    Anemia     taking iron supplement    Blood type A+     Depression     age 18-20 - now in remission    Depressive disorder 2005?    No symptoms since 2008    Low iron stores     has taken iron in 2014    Uncomplicated asthma     exercise induced asthma     Past Surgical History:   Procedure Laterality Date    D & C  08/05/2022    suction for missed AB    INSERT INTRAUTERINE DEVICE  12/18/2017    needs to be removed 12/18/22     Current Outpatient Medications   Medication Sig Dispense Refill    fluticasone (FLONASE) 50 MCG/ACT nasal spray Spray 2 sprays into both nostrils daily.      sertraline (ZOLOFT) 50 MG tablet Take 1 tablet (50 mg) by mouth daily 90 tablet 0     Allergies   Allergen Reactions    Prednisone      unsure      Social History     Tobacco Use    Smoking status: Never     Passive exposure: Never    Smokeless tobacco: Never   Substance Use Topics    Alcohol use: Yes     Comment: occ-not in PG     History   Drug Use Unknown       Review of Systems  Constitutional, HEENT, cardiovascular, pulmonary, gi and gu systems are  "negative, except as otherwise noted.    Objective    /62   Pulse 69   Temp 97.3  F (36.3  C) (Temporal)   Resp 14   Ht 1.625 m (5' 3.98\")   Wt 81.9 kg (180 lb 8 oz)   LMP 09/09/2024 (Approximate)   SpO2 100%   BMI 31.01 kg/m     Estimated body mass index is 31.01 kg/m  as calculated from the following:    Height as of this encounter: 1.625 m (5' 3.98\").    Weight as of this encounter: 81.9 kg (180 lb 8 oz).  Physical Exam  GENERAL: alert and no distress  EYES: Eyes grossly normal to inspection, PERRL and conjunctivae and sclerae normal  HENT: ear canals and TM's normal, nose and mouth without ulcers or lesions  NECK: no adenopathy, no asymmetry, masses, or scars  RESP: lungs clear to auscultation - no rales, rhonchi or wheezes  CV: regular rate and rhythm, normal S1 S2, no S3 or S4, no murmur, click or rub, no peripheral edema  ABDOMEN: soft, nontender, no hepatosplenomegaly, no masses and bowel sounds normal  MS: no gross musculoskeletal defects noted, no edema  SKIN: no suspicious lesions or rashes  NEURO: Normal strength and tone, mentation intact and speech normal  PSYCH: mentation appears normal, affect normal/bright    Recent Labs   Lab Test 12/21/23  1030   HGB 12.9      A1C 5.2      Diagnostics  Labs pending at this time.  Results will be reviewed when available.   No EKG required for low risk surgery (cataract, skin procedure, breast biopsy, etc).  No EKG required, no history of coronary heart disease, significant arrhythmia, peripheral arterial disease or other structural heart disease.    Revised Cardiac Risk Index (RCRI)  The patient has the following serious cardiovascular risks for perioperative complications:   - No serious cardiac risks = 0 points     RCRI Interpretation: 0 points: Class I (very low risk - 0.4% complication rate)       Signed Electronically by: ESPERANZA Dave CNP  A copy of this evaluation report is provided to the requesting physician.        Answers " submitted by the patient for this visit:  Patient Health Questionnaire (G7) (Submitted on 9/20/2024)  GARCIA 7 TOTAL SCORE: 4

## 2025-01-03 SDOH — HEALTH STABILITY: PHYSICAL HEALTH: ON AVERAGE, HOW MANY DAYS PER WEEK DO YOU ENGAGE IN MODERATE TO STRENUOUS EXERCISE (LIKE A BRISK WALK)?: 3 DAYS

## 2025-01-03 SDOH — HEALTH STABILITY: PHYSICAL HEALTH: ON AVERAGE, HOW MANY MINUTES DO YOU ENGAGE IN EXERCISE AT THIS LEVEL?: 30 MIN

## 2025-01-03 ASSESSMENT — SOCIAL DETERMINANTS OF HEALTH (SDOH): HOW OFTEN DO YOU GET TOGETHER WITH FRIENDS OR RELATIVES?: TWICE A WEEK

## 2025-01-06 ENCOUNTER — OFFICE VISIT (OUTPATIENT)
Dept: FAMILY MEDICINE | Facility: CLINIC | Age: 35
End: 2025-01-06
Attending: NURSE PRACTITIONER
Payer: COMMERCIAL

## 2025-01-06 VITALS
RESPIRATION RATE: 14 BRPM | BODY MASS INDEX: 31.36 KG/M2 | SYSTOLIC BLOOD PRESSURE: 118 MMHG | TEMPERATURE: 98.1 F | WEIGHT: 183.7 LBS | OXYGEN SATURATION: 100 % | DIASTOLIC BLOOD PRESSURE: 70 MMHG | HEART RATE: 76 BPM | HEIGHT: 64 IN

## 2025-01-06 DIAGNOSIS — Z00.00 ROUTINE GENERAL MEDICAL EXAMINATION AT A HEALTH CARE FACILITY: Primary | ICD-10-CM

## 2025-01-06 DIAGNOSIS — E61.1 IRON DEFICIENCY: ICD-10-CM

## 2025-01-06 DIAGNOSIS — F41.1 GAD (GENERALIZED ANXIETY DISORDER): ICD-10-CM

## 2025-01-06 LAB
BASOPHILS # BLD AUTO: 0 10E3/UL (ref 0–0.2)
BASOPHILS NFR BLD AUTO: 0 %
CHOLEST SERPL-MCNC: 131 MG/DL
EOSINOPHIL # BLD AUTO: 0.2 10E3/UL (ref 0–0.7)
EOSINOPHIL NFR BLD AUTO: 3 %
ERYTHROCYTE [DISTWIDTH] IN BLOOD BY AUTOMATED COUNT: 12.8 % (ref 10–15)
EST. AVERAGE GLUCOSE BLD GHB EST-MCNC: 103 MG/DL
FASTING STATUS PATIENT QL REPORTED: YES
FERRITIN SERPL-MCNC: 28 NG/ML (ref 6–175)
FOLATE SERPL-MCNC: 7.8 NG/ML (ref 4.6–34.8)
HBA1C MFR BLD: 5.2 % (ref 0–5.6)
HCT VFR BLD AUTO: 38.1 % (ref 35–47)
HDLC SERPL-MCNC: 43 MG/DL
HGB BLD-MCNC: 12.3 G/DL (ref 11.7–15.7)
IMM GRANULOCYTES # BLD: 0 10E3/UL
IMM GRANULOCYTES NFR BLD: 0 %
IRON BINDING CAPACITY (ROCHE): 385 UG/DL (ref 240–430)
IRON SATN MFR SERPL: 13 % (ref 15–46)
IRON SERPL-MCNC: 49 UG/DL (ref 37–145)
LDLC SERPL CALC-MCNC: 70 MG/DL
LYMPHOCYTES # BLD AUTO: 2.8 10E3/UL (ref 0.8–5.3)
LYMPHOCYTES NFR BLD AUTO: 36 %
MCH RBC QN AUTO: 24.1 PG (ref 26.5–33)
MCHC RBC AUTO-ENTMCNC: 32.3 G/DL (ref 31.5–36.5)
MCV RBC AUTO: 75 FL (ref 78–100)
MONOCYTES # BLD AUTO: 0.4 10E3/UL (ref 0–1.3)
MONOCYTES NFR BLD AUTO: 6 %
NEUTROPHILS # BLD AUTO: 4.3 10E3/UL (ref 1.6–8.3)
NEUTROPHILS NFR BLD AUTO: 55 %
NONHDLC SERPL-MCNC: 88 MG/DL
PLATELET # BLD AUTO: 399 10E3/UL (ref 150–450)
RBC # BLD AUTO: 5.1 10E6/UL (ref 3.8–5.2)
TRIGL SERPL-MCNC: 91 MG/DL
TSH SERPL DL<=0.005 MIU/L-ACNC: 1.39 UIU/ML (ref 0.3–4.2)
WBC # BLD AUTO: 7.8 10E3/UL (ref 4–11)

## 2025-01-06 PROCEDURE — 80061 LIPID PANEL: CPT | Performed by: NURSE PRACTITIONER

## 2025-01-06 PROCEDURE — G2211 COMPLEX E/M VISIT ADD ON: HCPCS | Performed by: NURSE PRACTITIONER

## 2025-01-06 PROCEDURE — 36415 COLL VENOUS BLD VENIPUNCTURE: CPT | Performed by: NURSE PRACTITIONER

## 2025-01-06 PROCEDURE — 96127 BRIEF EMOTIONAL/BEHAV ASSMT: CPT | Performed by: NURSE PRACTITIONER

## 2025-01-06 PROCEDURE — 90471 IMMUNIZATION ADMIN: CPT | Performed by: NURSE PRACTITIONER

## 2025-01-06 PROCEDURE — 85025 COMPLETE CBC W/AUTO DIFF WBC: CPT | Performed by: NURSE PRACTITIONER

## 2025-01-06 PROCEDURE — 99395 PREV VISIT EST AGE 18-39: CPT | Mod: 25 | Performed by: NURSE PRACTITIONER

## 2025-01-06 PROCEDURE — 83540 ASSAY OF IRON: CPT | Performed by: NURSE PRACTITIONER

## 2025-01-06 PROCEDURE — 99214 OFFICE O/P EST MOD 30 MIN: CPT | Mod: 25 | Performed by: NURSE PRACTITIONER

## 2025-01-06 PROCEDURE — 82746 ASSAY OF FOLIC ACID SERUM: CPT | Performed by: NURSE PRACTITIONER

## 2025-01-06 PROCEDURE — 82728 ASSAY OF FERRITIN: CPT | Performed by: NURSE PRACTITIONER

## 2025-01-06 PROCEDURE — 83036 HEMOGLOBIN GLYCOSYLATED A1C: CPT | Performed by: NURSE PRACTITIONER

## 2025-01-06 PROCEDURE — 84443 ASSAY THYROID STIM HORMONE: CPT | Performed by: NURSE PRACTITIONER

## 2025-01-06 PROCEDURE — 83550 IRON BINDING TEST: CPT | Performed by: NURSE PRACTITIONER

## 2025-01-06 PROCEDURE — 90677 PCV20 VACCINE IM: CPT | Performed by: NURSE PRACTITIONER

## 2025-01-06 RX ORDER — SERTRALINE HYDROCHLORIDE 25 MG/1
25 TABLET, FILM COATED ORAL DAILY
Qty: 90 TABLET | Refills: 1 | Status: SHIPPED | OUTPATIENT
Start: 2025-01-06

## 2025-01-06 ASSESSMENT — PAIN SCALES - GENERAL: PAINLEVEL_OUTOF10: NO PAIN (0)

## 2025-01-06 ASSESSMENT — ANXIETY QUESTIONNAIRES
7. FEELING AFRAID AS IF SOMETHING AWFUL MIGHT HAPPEN: SEVERAL DAYS
GAD7 TOTAL SCORE: 3
6. BECOMING EASILY ANNOYED OR IRRITABLE: NOT AT ALL
3. WORRYING TOO MUCH ABOUT DIFFERENT THINGS: NOT AT ALL
5. BEING SO RESTLESS THAT IT IS HARD TO SIT STILL: NOT AT ALL
4. TROUBLE RELAXING: SEVERAL DAYS
GAD7 TOTAL SCORE: 3
2. NOT BEING ABLE TO STOP OR CONTROL WORRYING: NOT AT ALL
7. FEELING AFRAID AS IF SOMETHING AWFUL MIGHT HAPPEN: SEVERAL DAYS
8. IF YOU CHECKED OFF ANY PROBLEMS, HOW DIFFICULT HAVE THESE MADE IT FOR YOU TO DO YOUR WORK, TAKE CARE OF THINGS AT HOME, OR GET ALONG WITH OTHER PEOPLE?: NOT DIFFICULT AT ALL
1. FEELING NERVOUS, ANXIOUS, OR ON EDGE: SEVERAL DAYS
GAD7 TOTAL SCORE: 3
IF YOU CHECKED OFF ANY PROBLEMS ON THIS QUESTIONNAIRE, HOW DIFFICULT HAVE THESE PROBLEMS MADE IT FOR YOU TO DO YOUR WORK, TAKE CARE OF THINGS AT HOME, OR GET ALONG WITH OTHER PEOPLE: NOT DIFFICULT AT ALL

## 2025-01-06 NOTE — PATIENT INSTRUCTIONS
Patient Education   Preventive Care Advice   This is general advice given by our system to help you stay healthy. However, your care team may have specific advice just for you. Please talk to your care team about your preventive care needs.  Nutrition  Eat 5 or more servings of fruits and vegetables each day.  Try wheat bread, brown rice and whole grain pasta (instead of white bread, rice, and pasta).  Get enough calcium and vitamin D. Check the label on foods and aim for 100% of the RDA (recommended daily allowance).  Lifestyle  Exercise at least 150 minutes each week  (30 minutes a day, 5 days a week).  Do muscle strengthening activities 2 days a week. These help control your weight and prevent disease.  No smoking.  Wear sunscreen to prevent skin cancer.  Have a dental exam and cleaning every 6 months.  Yearly exams  See your health care team every year to talk about:  Any changes in your health.  Any medicines your care team has prescribed.  Preventive care, family planning, and ways to prevent chronic diseases.  Shots (vaccines)   HPV shots (up to age 26), if you've never had them before.  Hepatitis B shots (up to age 59), if you've never had them before.  COVID-19 shot: Get this shot when it's due.  Flu shot: Get a flu shot every year.  Tetanus shot: Get a tetanus shot every 10 years.  Pneumococcal, hepatitis A, and RSV shots: Ask your care team if you need these based on your risk.  Shingles shot (for age 50 and up)  General health tests  Diabetes screening:  Starting at age 35, Get screened for diabetes at least every 3 years.  If you are younger than age 35, ask your care team if you should be screened for diabetes.  Cholesterol test: At age 39, start having a cholesterol test every 5 years, or more often if advised.  Bone density scan (DEXA): At age 50, ask your care team if you should have this scan for osteoporosis (brittle bones).  Hepatitis C: Get tested at least once in your life.  STIs (sexually  transmitted infections)  Before age 24: Ask your care team if you should be screened for STIs.  After age 24: Get screened for STIs if you're at risk. You are at risk for STIs (including HIV) if:  You are sexually active with more than one person.  You don't use condoms every time.  You or a partner was diagnosed with a sexually transmitted infection.  If you are at risk for HIV, ask about PrEP medicine to prevent HIV.  Get tested for HIV at least once in your life, whether you are at risk for HIV or not.  Cancer screening tests  Cervical cancer screening: If you have a cervix, begin getting regular cervical cancer screening tests starting at age 21.  Breast cancer scan (mammogram): If you've ever had breasts, begin having regular mammograms starting at age 40. This is a scan to check for breast cancer.  Colon cancer screening: It is important to start screening for colon cancer at age 45.  Have a colonoscopy test every 10 years (or more often if you're at risk) Or, ask your provider about stool tests like a FIT test every year or Cologuard test every 3 years.  To learn more about your testing options, visit:   .  For help making a decision, visit:   https://bit.ly/xm51339.  Prostate cancer screening test: If you have a prostate, ask your care team if a prostate cancer screening test (PSA) at age 55 is right for you.  Lung cancer screening: If you are a current or former smoker ages 50 to 80, ask your care team if ongoing lung cancer screenings are right for you.  For informational purposes only. Not to replace the advice of your health care provider. Copyright   2023 [employer]. All rights reserved. Clinically reviewed by the [facility] Transitions Program. [employer] [id number] - REV 01/24.  Your Health Risk Assessment indicates you feel you are not in good health    A healthy lifestyle helps keep the body fit and the mind alert. It helps protect you from disease, helps you fight disease, and  helps prevent chronic disease (disease that doesn't go away) from getting worse. This is important as you get older and begin to notice twinges in muscles and joints and a decline in the strength and stamina you once took for granted. A healthy lifestyle includes good healthcare, good nutrition, weight control, recreation, and regular exercise. Avoid harmful substances and do what you can to keep safe. Another part of a healthy lifestyle is stay mentally active and socially involved.    Good healthcare   Have a wellness visit every year.   If you have new symptoms, let us know right away. Don't wait until the next checkup.   Take medicines exactly as prescribed and keep your medicines in a safe place. Tell us if your medicine causes problems.   Healthy diet and weight control   Eat 3 or 4 small, nutritious, low-fat, high-fiber meals a day. Include a variety of fruits, vegetables, and whole-grain foods.   Make sure you get enough calcium in your diet. Calcium, vitamin D, and exercise help prevent osteoporosis (bone thinning).   If you live alone, try eating with others when you can. That way you get a good meal and have company while you eat it.   Try to keep a healthy weight. If you eat more calories than your body uses for energy, it will be stored as fat and you will gain weight.     Recreation   Recreation is not limited to sports and team events. It includes any activity that provides relaxation, interest, enjoyment, and exercise. Recreation provides an outlet for physical, mental, and social energy. It can give a sense of worth and achievement. It can help you stay healthy.    Mental Exercise and Social Involvement  Mental and emotional health is as important as physical health. Keep in touch with friends and family. Stay as active as possible. Continue to learn and challenge yourself.   Things you can do to stay mentally active are:  Learn something new, like a foreign language or musical instrument.   Play  SCRABBLE or do crossword puzzles. If you cannot find people to play these games with you at home, you can play them with others on your computer through the Internet.   Join a games club--anything from card games to chess or checkers or lawn bowling.   Start a new hobby.   Go back to school.   Volunteer.   Read.   Keep up with world events.

## 2025-01-06 NOTE — PROGRESS NOTES
"Preventive Care Visit  [facility] ESPERANZA Buchanan CNP, Family Medicine  Jan 6, 2025      Assessment & Plan     GARCIA (generalized anxiety disorder)  Mood symptoms addressed. Symptoms controlled/stable.  Continue plan.  Healthy self cares.  Will refill as necessary in between visits.  Routine follow-up is every 6 months.  Counseling, as needed.  Patient advised to follow-up if worsening symptoms.  - OFFICE/OUTPT VISIT,EST,LEVL IV  - sertraline (ZOLOFT) 25 MG tablet; Take 1 tablet (25 mg) by mouth daily.    Iron deficiency  -Stable, continue plan.  Medication refills provided as necessary.  Lab monitoring if needed/considered.    - OFFICE/OUTPT VISIT,EST,LEVL IV  - CBC with Platelets & Differential; Future  - Ferritin; Future  - Folate; Future  - Iron & Iron Binding Capacity; Future  - CBC with Platelets & Differential  - Ferritin  - Folate  - Iron & Iron Binding Capacity    Routine general medical examination at a health care facility  Discussed vaccines, due to recurrent illnesses advise Prevnar 20.  This was completed today.    Discussed options and rationale.  Ordered HCM testing per our discussion and patient decision based on USPSTF and Cancer screening guidelines.      Reinforced healthy habits with lifestyle, exercise and nutrition.  - Lipid panel reflex to direct LDL Fasting; Future  - TSH with free T4 reflex; Future  - Hemoglobin A1c; Future  - CBC with Platelets & Differential; Future  - Lipid panel reflex to direct LDL Fasting  - TSH with free T4 reflex  - Hemoglobin A1c  - CBC with Platelets & Differential    Patient has been advised of split billing requirements and indicates understanding: Yes        BMI  Estimated body mass index is 31.95 kg/m  as calculated from the following:    Height as of this encounter: 1.615 m (5' 3.58\").    Weight as of this encounter: 83.3 kg (183 lb 11.2 oz).   Weight management plan: Discussed healthy diet and exercise " guidelines    Counseling  Appropriate preventive services were addressed with this patient via screening, questionnaire, or discussion as appropriate for fall prevention, nutrition, physical activity, Tobacco-use cessation, social engagement, weight loss and cognition.  Checklist reviewing preventive services available has been given to the patient.  Reviewed patient's diet, addressing concerns and/or questions.   She is at risk for lack of exercise and has been provided with information to increase physical activity for the benefit of her well-being.       MEDICATIONS:  Continue current medications without change    The longitudinal plan of care for the diagnosis(es)/condition(s) as documented were addressed during this visit. Due to the added complexity in care, I will continue to support Azalea in the subsequent management and with ongoing continuity of care.    Subjective   Azalea is a 34 year old, presenting for the following:    - update from last year: has had multiple sinus/ear infections, saw ENT, had ear tubes placed, has another infection, has had 8 rounds of antibiotics - is she just getting sick a lot this year or is there something more that she could/should do.    history of D&C with Retained products of miscarriage resulting in iron deficiency.  Patient is taking iron tablets weekly.  Wants labs rechecked.    Anxiety-stable, utilizing sertraline only at 25 mg.  So she has not been running out of medications.    Physical        1/6/2025    10:18 AM   Additional Questions   Roomed by AG   Accompanied by self          HPI          Health Care Directive  Patient does not have a Health Care Directive: Discussed advance care planning with patient; however, patient declined at this time.      1/3/2025   General Health   How would you rate your overall physical health? (!) FAIR   Feel stress (tense, anxious, or unable to sleep) Only a little   (!) STRESS CONCERN      1/3/2025   Nutrition   Three or more  servings of calcium each day? Yes   Diet: Regular (no restrictions)   How many servings of fruit and vegetables per day? (!) 2-3   How many sweetened beverages each day? 0-1         1/3/2025   Exercise   Days per week of moderate/strenous exercise 3 days   Average minutes spent exercising at this level 30 min         1/3/2025   Social Factors   Frequency of gathering with friends or relatives Twice a week   Worry food won't last until get money to buy more No   Food not last or not have enough money for food? No   Do you have housing? (Housing is defined as stable permanent housing and does not include staying ouside in a car, in a tent, in an abandoned building, in an overnight shelter, or couch-surfing.) Yes   Are you worried about losing your housing? No   Lack of transportation? No   Unable to get utilities (heat,electricity)? No         1/3/2025   Dental   Dentist two times every year? Yes         1/3/2025   TB Screening   Were you born outside of the [place]? No         Today's PHQ-2 Score:       1/6/2025     9:16 AM   PHQ-2 ( 1999 [employer])   Q1: Little interest or pleasure in doing things 1   Q2: Feeling down, depressed or hopeless 0   PHQ-2 Score 1    Q1: Little interest or pleasure in doing things Several days   Q2: Feeling down, depressed or hopeless Not at all   PHQ-2 Score 1       Patient-reported           1/3/2025   Substance Use   Alcohol more than 3/day or more than 7/wk No   Do you use any other substances recreationally? No     Social History     Tobacco Use    Smoking status: Never     Passive exposure: Never    Smokeless tobacco: Never   Vaping Use    Vaping status: Never Used   Substance Use Topics    Alcohol use: Yes     Comment: occ-not in PG    Drug use: Never           7/3/2021   LAST FHS-7 RESULTS   1st degree relative breast or ovarian cancer Yes   Any relative bilateral breast cancer Unknown   Any male have breast cancer No   Any ONE woman have BOTH breast AND ovarian cancer Yes   Any woman  "with breast cancer before 50yrs Yes   2 or more relatives with breast AND/OR ovarian cancer Yes   2 or more relatives with breast AND/OR bowel cancer Yes   No first degree relative.         Mammogram Screening - Patient under 40 years of age: Routine Mammogram Screening not recommended.         1/3/2025   STI Screening   New sexual partner(s) since last STI/HIV test? No     History of abnormal Pap smear: No - age 30- 64 PAP with HPV every 5 years recommended        Latest Ref Rng & Units 7/6/2021     5:51 PM 7/6/2021     5:50 PM 11/7/2018     2:13 PM   PAP / HPV   PAP (Historical)  NIL   NIL    HPV 16 DNA NEG^Negative  Negative     HPV 18 DNA NEG^Negative  Negative     Other HR HPV NEG^Negative  Negative             1/3/2025   Contraception/Family Planning   Questions about contraception or family planning No        Reviewed and updated as needed this visit by Provider                          Review of Systems  Constitutional, neuro, ENT, endocrine, pulmonary, cardiac, gastrointestinal, genitourinary, musculoskeletal, integument and psychiatric systems are negative, except as otherwise noted.     Objective    Exam  /70   Pulse 76   Temp 98.1  F (36.7  C) (Temporal)   Resp 14   Ht 1.615 m (5' 3.58\")   Wt 83.3 kg (183 lb 11.2 oz)   LMP 12/09/2024 (Exact Date)   SpO2 100%   BMI 31.95 kg/m     Estimated body mass index is 31.95 kg/m  as calculated from the following:    Height as of this encounter: 1.615 m (5' 3.58\").    Weight as of this encounter: 83.3 kg (183 lb 11.2 oz).    Physical Exam  GENERAL: alert and no distress  EYES: Eyes grossly normal to inspection, PERRL and conjunctivae and sclerae normal  HENT: normal cephalic/atraumatic, both ears: No effusions, bilateral TMs with PE tubes open and patent, nose and mouth without ulcers or lesions, oropharynx clear, and oral mucous membranes moist  NECK: no adenopathy, no asymmetry, masses, or scars  RESP: lungs clear to auscultation - no rales, rhonchi " or wheezes  BREAST: normal without masses, tenderness or nipple discharge and no palpable axillary masses or adenopathy  CV: regular rate and rhythm, normal S1 S2, no S3 or S4, no murmur, click or rub, no peripheral edema  ABDOMEN: soft, nontender, no hepatosplenomegaly, no masses and bowel sounds normal  MS: no gross musculoskeletal defects noted, no edema  SKIN: no suspicious lesions or rashes  NEURO: Normal strength and tone, mentation intact and speech normal  PSYCH: mentation appears normal, affect normal/bright        Signed Electronically by: ESPERANZA Bonilla CNP    Answers submitted by the patient for this visit:  Patient Health Questionnaire (G7) (Submitted on 1/6/2025)  GARCIA 7 TOTAL SCORE: 3

## 2025-01-06 NOTE — NURSING NOTE
Prior to immunization administration, verified patients identity using patient s name and date of birth. Please see Immunization Activity for additional information.     Screening Questionnaire for Adult Immunization    Are you sick today?   No   Do you have allergies to medications, food, a vaccine component or latex?   No   Have you ever had a serious reaction after receiving a vaccination?   No   Do you have a long-term health problem with heart, lung, kidney, or metabolic disease (e.g., diabetes), asthma, a blood disorder, no spleen, complement component deficiency, a cochlear implant, or a spinal fluid leak?  Are you on long-term aspirin therapy?   No   Do you have cancer, leukemia, HIV/AIDS, or any other immune system problem?   No   Do you have a parent, brother, or sister with an immune system problem?   No   In the past 3 months, have you taken medications that affect  your immune system, such as prednisone, other steroids, or anticancer drugs; drugs for the treatment of rheumatoid arthritis, Crohn s disease, or psoriasis; or have you had radiation treatments?   No   Have you had a seizure, or a brain or other nervous system problem?   No   During the past year, have you received a transfusion of blood or blood    products, or been given immune (gamma) globulin or antiviral drug?   No   For women: Are you pregnant or is there a chance you could become       pregnant during the next month?   No   Have you received any vaccinations in the past 4 weeks?   Yes     Immunization questionnaire was positive for at least one answer.  Notified provider.      Patient instructed to remain in clinic for 15 minutes afterwards, and to report any adverse reactions.     Screening performed by Gay Ho MA on 1/6/2025 at 11:12 AM.

## 2025-01-07 NOTE — RESULT ENCOUNTER NOTE
Foster Tristan,   Iron is not too low, but could use a little boosting, and maybe add another dose of iron/week.   Other labs in good range.   Sincerely,   Annie Byrnes, DNP

## 2025-04-21 ENCOUNTER — TRANSFERRED RECORDS (OUTPATIENT)
Dept: HEALTH INFORMATION MANAGEMENT | Facility: CLINIC | Age: 35
End: 2025-04-21
Payer: COMMERCIAL

## 2025-05-07 ENCOUNTER — TRANSFERRED RECORDS (OUTPATIENT)
Dept: HEALTH INFORMATION MANAGEMENT | Facility: CLINIC | Age: 35
End: 2025-05-07
Payer: COMMERCIAL

## 2025-07-13 DIAGNOSIS — F41.1 GAD (GENERALIZED ANXIETY DISORDER): ICD-10-CM

## 2025-07-14 RX ORDER — SERTRALINE HYDROCHLORIDE 25 MG/1
25 TABLET, FILM COATED ORAL DAILY
Qty: 90 TABLET | Refills: 0 | Status: SHIPPED | OUTPATIENT
Start: 2025-07-14

## 2025-07-14 NOTE — TELEPHONE ENCOUNTER
Please aid in scheduling.  Due for chronic illness visit. Virtual preferred.   Tammie refill given.   Thank you,   Annie Byrnes DNP